# Patient Record
Sex: FEMALE | Race: WHITE | Employment: UNEMPLOYED | URBAN - NONMETROPOLITAN AREA
[De-identification: names, ages, dates, MRNs, and addresses within clinical notes are randomized per-mention and may not be internally consistent; named-entity substitution may affect disease eponyms.]

---

## 2022-03-30 ENCOUNTER — HOSPITAL ENCOUNTER (INPATIENT)
Age: 62
LOS: 3 days | Discharge: HOME | DRG: 287 | End: 2022-04-02
Attending: HOSPITALIST | Admitting: STUDENT IN AN ORGANIZED HEALTH CARE EDUCATION/TRAINING PROGRAM

## 2022-03-30 ENCOUNTER — APPOINTMENT (OUTPATIENT)
Dept: CT IMAGING | Age: 62
DRG: 287 | End: 2022-03-30
Attending: HOSPITALIST

## 2022-03-30 ENCOUNTER — APPOINTMENT (OUTPATIENT)
Dept: GENERAL RADIOLOGY | Age: 62
DRG: 287 | End: 2022-03-30
Attending: HOSPITALIST

## 2022-03-30 PROBLEM — R07.9 CHEST PAIN: Status: ACTIVE | Noted: 2022-03-30

## 2022-03-30 PROBLEM — I50.21 ACUTE HFREF (HEART FAILURE WITH REDUCED EJECTION FRACTION) (HCC): Status: ACTIVE | Noted: 2022-03-30

## 2022-03-30 PROBLEM — F17.200 CURRENT SMOKER: Status: ACTIVE | Noted: 2022-03-30

## 2022-03-30 LAB
ALBUMIN SERPL-MCNC: 4.5 G/DL (ref 3.5–5.2)
ALP BLD-CCNC: 139 U/L (ref 35–104)
ALT SERPL-CCNC: 48 U/L (ref 5–33)
ANION GAP SERPL CALCULATED.3IONS-SCNC: 17 MMOL/L (ref 7–19)
APTT: 26.3 SEC (ref 26–36.2)
AST SERPL-CCNC: 26 U/L (ref 5–32)
BASOPHILS ABSOLUTE: 0 K/UL (ref 0–0.2)
BASOPHILS RELATIVE PERCENT: 0.1 % (ref 0–1)
BILIRUB SERPL-MCNC: 0.4 MG/DL (ref 0.2–1.2)
BUN BLDV-MCNC: 20 MG/DL (ref 6–20)
CALCIUM SERPL-MCNC: 9.5 MG/DL (ref 8.6–10)
CHLORIDE BLD-SCNC: 101 MMOL/L (ref 98–111)
CO2: 21 MMOL/L (ref 22–29)
CREAT SERPL-MCNC: 0.8 MG/DL (ref 0.5–0.9)
D DIMER: 1.54 UG/ML FEU (ref 0–0.48)
EKG P AXIS: 68 DEGREES
EKG P-R INTERVAL: 132 MS
EKG Q-T INTERVAL: 382 MS
EKG QRS DURATION: 156 MS
EKG QTC CALCULATION (BAZETT): 459 MS
EKG T AXIS: 138 DEGREES
EOSINOPHILS ABSOLUTE: 0 K/UL (ref 0–0.6)
EOSINOPHILS RELATIVE PERCENT: 0 % (ref 0–5)
GFR AFRICAN AMERICAN: >59
GFR NON-AFRICAN AMERICAN: >60
GLUCOSE BLD-MCNC: 172 MG/DL (ref 74–109)
HCT VFR BLD CALC: 39.4 % (ref 37–47)
HEMOGLOBIN: 12.7 G/DL (ref 12–16)
IMMATURE GRANULOCYTES #: 0.1 K/UL
INR BLD: 0.99 (ref 0.88–1.18)
LV EF: 15 %
LVEF MODALITY: NORMAL
LYMPHOCYTES ABSOLUTE: 1 K/UL (ref 1.1–4.5)
LYMPHOCYTES RELATIVE PERCENT: 10.8 % (ref 20–40)
MCH RBC QN AUTO: 32.2 PG (ref 27–31)
MCHC RBC AUTO-ENTMCNC: 32.2 G/DL (ref 33–37)
MCV RBC AUTO: 99.7 FL (ref 81–99)
MONOCYTES ABSOLUTE: 0.1 K/UL (ref 0–0.9)
MONOCYTES RELATIVE PERCENT: 0.7 % (ref 0–10)
NEUTROPHILS ABSOLUTE: 7.7 K/UL (ref 1.5–7.5)
NEUTROPHILS RELATIVE PERCENT: 87.8 % (ref 50–65)
PDW BLD-RTO: 14.6 % (ref 11.5–14.5)
PLATELET # BLD: 247 K/UL (ref 130–400)
PMV BLD AUTO: 9.8 FL (ref 9.4–12.3)
POTASSIUM REFLEX MAGNESIUM: 4.2 MMOL/L (ref 3.5–5)
PRO-BNP: ABNORMAL PG/ML (ref 0–900)
PROTHROMBIN TIME: 13.3 SEC (ref 12–14.6)
RBC # BLD: 3.95 M/UL (ref 4.2–5.4)
SODIUM BLD-SCNC: 139 MMOL/L (ref 136–145)
TOTAL PROTEIN: 7.3 G/DL (ref 6.6–8.7)
TROPONIN: <0.01 NG/ML (ref 0–0.03)
TSH REFLEX FT4: 0.87 UIU/ML (ref 0.35–5.5)
WBC # BLD: 8.8 K/UL (ref 4.8–10.8)

## 2022-03-30 PROCEDURE — 6360000004 HC RX CONTRAST MEDICATION: Performed by: STUDENT IN AN ORGANIZED HEALTH CARE EDUCATION/TRAINING PROGRAM

## 2022-03-30 PROCEDURE — 6360000002 HC RX W HCPCS: Performed by: STUDENT IN AN ORGANIZED HEALTH CARE EDUCATION/TRAINING PROGRAM

## 2022-03-30 PROCEDURE — 85730 THROMBOPLASTIN TIME PARTIAL: CPT

## 2022-03-30 PROCEDURE — 84443 ASSAY THYROID STIM HORMONE: CPT

## 2022-03-30 PROCEDURE — 36415 COLL VENOUS BLD VENIPUNCTURE: CPT

## 2022-03-30 PROCEDURE — C8929 TTE W OR WO FOL WCON,DOPPLER: HCPCS

## 2022-03-30 PROCEDURE — 71275 CT ANGIOGRAPHY CHEST: CPT

## 2022-03-30 PROCEDURE — 93005 ELECTROCARDIOGRAM TRACING: CPT | Performed by: STUDENT IN AN ORGANIZED HEALTH CARE EDUCATION/TRAINING PROGRAM

## 2022-03-30 PROCEDURE — 6360000004 HC RX CONTRAST MEDICATION: Performed by: NURSE PRACTITIONER

## 2022-03-30 PROCEDURE — 85379 FIBRIN DEGRADATION QUANT: CPT

## 2022-03-30 PROCEDURE — 2580000003 HC RX 258: Performed by: STUDENT IN AN ORGANIZED HEALTH CARE EDUCATION/TRAINING PROGRAM

## 2022-03-30 PROCEDURE — 85610 PROTHROMBIN TIME: CPT

## 2022-03-30 PROCEDURE — 2140000000 HC CCU INTERMEDIATE R&B

## 2022-03-30 PROCEDURE — 84484 ASSAY OF TROPONIN QUANT: CPT

## 2022-03-30 PROCEDURE — 83880 ASSAY OF NATRIURETIC PEPTIDE: CPT

## 2022-03-30 PROCEDURE — 71045 X-RAY EXAM CHEST 1 VIEW: CPT

## 2022-03-30 PROCEDURE — 80053 COMPREHEN METABOLIC PANEL: CPT

## 2022-03-30 PROCEDURE — 85025 COMPLETE CBC W/AUTO DIFF WBC: CPT

## 2022-03-30 RX ORDER — FAMOTIDINE 10 MG
10 TABLET ORAL 2 TIMES DAILY PRN
COMMUNITY

## 2022-03-30 RX ORDER — SODIUM CHLORIDE 0.9 % (FLUSH) 0.9 %
5-40 SYRINGE (ML) INJECTION PRN
Status: DISCONTINUED | OUTPATIENT
Start: 2022-03-30 | End: 2022-04-02 | Stop reason: HOSPADM

## 2022-03-30 RX ORDER — SODIUM CHLORIDE 0.9 % (FLUSH) 0.9 %
5-40 SYRINGE (ML) INJECTION EVERY 12 HOURS SCHEDULED
Status: DISCONTINUED | OUTPATIENT
Start: 2022-03-30 | End: 2022-04-02 | Stop reason: HOSPADM

## 2022-03-30 RX ORDER — SPIRONOLACTONE 50 MG/1
25 TABLET, FILM COATED ORAL DAILY
Status: DISCONTINUED | OUTPATIENT
Start: 2022-03-31 | End: 2022-04-01

## 2022-03-30 RX ORDER — ACETAMINOPHEN 650 MG/1
650 SUPPOSITORY RECTAL EVERY 6 HOURS PRN
Status: DISCONTINUED | OUTPATIENT
Start: 2022-03-30 | End: 2022-04-02 | Stop reason: HOSPADM

## 2022-03-30 RX ORDER — CARVEDILOL 6.25 MG/1
6.25 TABLET ORAL 2 TIMES DAILY WITH MEALS
Status: DISCONTINUED | OUTPATIENT
Start: 2022-03-31 | End: 2022-04-01

## 2022-03-30 RX ORDER — LISINOPRIL 5 MG/1
5 TABLET ORAL DAILY
Status: DISCONTINUED | OUTPATIENT
Start: 2022-03-31 | End: 2022-04-01

## 2022-03-30 RX ORDER — FUROSEMIDE 10 MG/ML
40 INJECTION INTRAMUSCULAR; INTRAVENOUS 2 TIMES DAILY
Status: DISCONTINUED | OUTPATIENT
Start: 2022-03-30 | End: 2022-04-01

## 2022-03-30 RX ORDER — ACETAMINOPHEN 325 MG/1
650 TABLET ORAL EVERY 6 HOURS PRN
Status: DISCONTINUED | OUTPATIENT
Start: 2022-03-30 | End: 2022-04-02 | Stop reason: HOSPADM

## 2022-03-30 RX ORDER — POLYETHYLENE GLYCOL 3350 17 G/17G
17 POWDER, FOR SOLUTION ORAL DAILY PRN
Status: DISCONTINUED | OUTPATIENT
Start: 2022-03-30 | End: 2022-04-02 | Stop reason: HOSPADM

## 2022-03-30 RX ORDER — ALBUTEROL SULFATE 90 UG/1
2 AEROSOL, METERED RESPIRATORY (INHALATION) EVERY 6 HOURS PRN
COMMUNITY

## 2022-03-30 RX ORDER — ONDANSETRON 2 MG/ML
4 INJECTION INTRAMUSCULAR; INTRAVENOUS EVERY 6 HOURS PRN
Status: DISCONTINUED | OUTPATIENT
Start: 2022-03-30 | End: 2022-04-02 | Stop reason: HOSPADM

## 2022-03-30 RX ORDER — ONDANSETRON 4 MG/1
4 TABLET, ORALLY DISINTEGRATING ORAL EVERY 8 HOURS PRN
Status: DISCONTINUED | OUTPATIENT
Start: 2022-03-30 | End: 2022-04-02 | Stop reason: HOSPADM

## 2022-03-30 RX ORDER — SODIUM CHLORIDE 9 MG/ML
INJECTION, SOLUTION INTRAVENOUS PRN
Status: DISCONTINUED | OUTPATIENT
Start: 2022-03-30 | End: 2022-04-02 | Stop reason: HOSPADM

## 2022-03-30 RX ORDER — LANOLIN ALCOHOL/MO/W.PET/CERES
12 CREAM (GRAM) TOPICAL NIGHTLY
COMMUNITY

## 2022-03-30 RX ADMIN — IOPAMIDOL 90 ML: 755 INJECTION, SOLUTION INTRAVENOUS at 15:21

## 2022-03-30 RX ADMIN — FUROSEMIDE 40 MG: 10 INJECTION, SOLUTION INTRAMUSCULAR; INTRAVENOUS at 17:38

## 2022-03-30 RX ADMIN — SODIUM CHLORIDE, PRESERVATIVE FREE 10 ML: 5 INJECTION INTRAVENOUS at 20:35

## 2022-03-30 RX ADMIN — ENOXAPARIN SODIUM 40 MG: 40 INJECTION SUBCUTANEOUS at 16:36

## 2022-03-30 RX ADMIN — SODIUM CHLORIDE, PRESERVATIVE FREE 10 ML: 5 INJECTION INTRAVENOUS at 22:02

## 2022-03-30 RX ADMIN — PERFLUTREN 1.5 ML: 6.52 INJECTION, SUSPENSION INTRAVENOUS at 14:55

## 2022-03-30 ASSESSMENT — LIFESTYLE VARIABLES
HOW MANY STANDARD DRINKS CONTAINING ALCOHOL DO YOU HAVE ON A TYPICAL DAY: 1 OR 2
HOW OFTEN DO YOU HAVE A DRINK CONTAINING ALCOHOL: 2-4 TIMES A MONTH

## 2022-03-30 NOTE — PROGRESS NOTES
4 Eyes Skin Assessment    Anel Sharyn is being assessed upon: Admission    I agree that I, Yehuda Cordova RN, along with Terry Suarez RN (either 2 RN's or 1 LPN and 1 RN) have performed a thorough Head to Toe Skin Assessment on the patient. ALL assessment sites listed below have been assessed. Areas assessed by both nurses:     [x]   Head, Face, and Ears   [x]   Shoulders, Back, and Chest  [x]   Arms, Elbows, and Hands   [x]   Coccyx, Sacrum, and Ischium  [x]   Legs, Feet, and Heels    Does the Patient have Skin Breakdown?  No    Tanvir Prevention initiated: No  Wound Care Orders initiated: NA    [facility] nurse consulted for Pressure Injury (Stage 3,4, Unstageable, DTI, NWPT, and Complex wounds) and New or Established Ostomies: NA        Primary Nurse eSignature: Yehuda Cordova RN on 3/30/2022 at 5:32 PM      Co-Signer eSignature: Electronically signed by Terry Suarez RN on 3/30/22 at 6:21 PM CDT

## 2022-03-30 NOTE — PROGRESS NOTES
Cristiano Calle received from 3rd floor to room # 728 . Mental Status: Patient is oriented and alert. Vitals:    03/30/22 1716   BP: 122/74   Pulse: 104   Resp: 16   Temp: 97 °F (36.1 °C)   SpO2: 94%     Placed on cardiac monitor: Yes. Box # 728. Belongings: Glasses with patient at bedside . Family at bedside Yes. Oriented Patient to room. Call light within reach. Yes. Transfer was: Well tolerated by patient. .    Electronically signed by Fei Green RN on 3/30/2022 at 5:33 PM

## 2022-03-30 NOTE — H&P
[place], [place], [address]    DEPARTMENT OF HOSPITALIST MEDICINE      HISTORY & PHYSICAL:          REASON FOR ADMISSION:  No chief complaint on file. HISTORY OF PRESENT ILLNESS:  Celio Nolasco is an 63 y.o. female. With past medical history of COPD, current smoker 1 pack a day, status post right shoulder replacement. Patient states in December she got an upper respiratory infection did not think she had Covid and was not tested. Since that time she has had dyspnea on exertion. She complains of tight feeling at the base of her throat. . Sometimes breaks out in a sweat with this. The last 3 days she has had chest pressure radiating up towards her neck. Patient presented to [facility] for evaluation. She is found to have a positive troponin and was sent here for higher level care care on arrival she had an elevated D-dimer and a negative troponin. Patient was sent for an echo and she had a EF of 15% with LV dilation. CTA was negative for PE. PAST MEDICAL HISTORY:  No past medical history on file. PAST SURGICAL HISTORY:  Past Surgical History:   Procedure Laterality Date    ANKLE SURGERY Left      SECTION      SHOULDER SURGERY Right         SOCIAL HISTORY:  Social History     Socioeconomic History    Marital status:      Spouse name: None    Number of children: None    Years of education: None    Highest education level: None   Occupational History    None   Tobacco Use    Smoking status: Current Every Day Smoker     Packs/day: 0.50     Types: Cigarettes    Smokeless tobacco: Never Used   Substance and Sexual Activity    Alcohol use:  Yes    Drug use: Never    Sexual activity: None   Other Topics Concern    None   Social History Narrative    None     Social Determinants of Health     Financial Resource Strain:     Difficulty of Paying Living Expenses: Not on file   Food Insecurity:     Worried About Running Out of Food in the Last Year: Not on file    [address] in the Last Year: Not on file   Transportation Needs:     Lack of Transportation (Medical): Not on file    Lack of Transportation (Non-Medical):  Not on file   Physical Activity:     Days of Exercise per Week: Not on file    Minutes of Exercise per Session: Not on file   Stress:     Feeling of Stress : Not on file   Social Connections:     Frequency of Communication with Friends and Family: Not on file    Frequency of Social Gatherings with Friends and Family: Not on file    Attends Rastafarian Services: Not on file    Active Member of [address] [place] [address] [employer] or Organizations: Not on file    Attends Club or Organization Meetings: Not on file    Marital Status: Not on file   Intimate Partner Violence:     Fear of Current or Ex-Partner: Not on file    Emotionally Abused: Not on file    Physically Abused: Not on file    Sexually Abused: Not on file   Housing Stability:     Unable to Pay for Housing in the Last Year: Not on file    Number of [place] in the Last Year: Not on file    Unstable Housing in the Last Year: Not on file        FAMILY HISTORY:  Family History   Problem Relation Age of Onset    High Blood Pressure Father          ALLERGIES:  Allergies   Allergen Reactions    Morphine Nausea And Vomiting    Nickel Rash        PRIOR TO ADMISSION MEDS:  Medications Prior to Admission: melatonin 3 MG TABS tablet, Take 10 mg by mouth at bedtime     REVIEW OF SYSTEMS:  Constitutional:  No fevers, chills, nausea, vomiting, + tiredness & fatigue   Head:  No head injury, facial trauma   Eyes:  No acute visual changes, exudate, trauma   Ears:  No acute hearing loss, earaches   Nose: No nasal discharge, epistaxis   Neck: No new hoarseness, voice change, or new masses   Lungs:   No hemoptysis, pleurisy   Heart:  No chest pressure with exertion, palpitations,    Abdomen:   No new masses, no bright red blood per rectum   Extremities: No acute pain while ambulating, no new lesions   Skin: No new changes in skin color, no rashes or lesions   Neurologic: No new motor or sensory changes     14 point review of systems addressed with patient which is essentially negative except as specifically addressed above:    PHYSICAL EXAM:  /75   Pulse 107   Temp 96.9 °F (36.1 °C) (Temporal)   Resp 16   Ht 5' 6\" (1.676 m)   Wt 170 lb 12.8 oz (77.5 kg)   SpO2 96%   BMI 27.57 kg/m²   No intake/output data recorded.       PHYSICAL EXAMINATION:    Vital Signs: Please see the chart   KAREN:  Awake, alert, oriented x 3, patient appears tired and fatigued   Head/Eyes:  Normocephalic, atraumatic, EOMI and PERRLA bilaterally   ENT: Moist mucous membranes, nasal passages clear   Neck: Supple, full range of motion, no carotid bruit, trachea midline   Respiratory:   Bilateral fair air entry in both lung fields, mild B/L crackles, symmetric expansion of chest   Cardiovascular:  Regular rate and rhythm, S1+S2+0, no murmurs/rubs   Urology: No bilateral CVA tenderness, no suprapubic tenderness   Abdomen:   Soft, non-tender, bowel sounds +ve, no organomegaly   Muscle/Joints: Moves all, full range of motion, no muscle spasms   Extremities: No clubbing, no cyanosis, no calf tenderness, no edema   Pulses: 2+ bilaterally, symmetrical   Skin: Warm, dry, no pallor/cyanosis/jaundice, no rashes/lesions   Neurologic: Awake, alert, oriented x 3, cranial nerves II-XII intact, no focal neurological deficits, sensory system intact   Psychiatric: Normal mood, non-suicidal         LABORATORY DATA:    CBC:  Recent Labs     03/30/22  1351   WBC 8.8   HGB 12.7   HCT 39.4        BMP:  Recent Labs     03/30/22  1351      K 4.2      CO2 21*   BUN 20   CREATININE 0.8   CALCIUM 9.5     Recent Labs     03/30/22  1351   AST 26   ALT 48*   BILITOT 0.4   ALKPHOS 139*     Coag Panel:   Recent Labs     03/30/22  1351   INR 0.99   PROTIME 13.3   APTT 26.3     Cardiac Enzymes:   Recent Labs     03/30/22  1351   TROPONINI <0.01     ABGs:No results found for: PHART, PO2ART, VNF4ZPR  Urinalysis:No results found for: [facility], BACTERIA, RBCUA, BLOODU, SPECGRAV, GLUCOSEU  A1C: No results for input(s): LABA1C in the last 72 hours. ABG:No results for input(s): PHART, DTF9TFW, PO2ART, WHG7XNO, BEART, HGBAE, L4JIZLOZ, CARBOXHGBART in the last 72 hours. EKG:   Please see chart      IMAGING:  XR CHEST PORTABLE    Result Date: 3/30/2022  No acute cardiopulmonary process. Signed by Dr Sanchez Channel    CTA [address]     Result Date: 3/30/2022  1. No evidence of pulmonary embolus. 2. Left ventricle is quite dilated, suspect dilated cardiomyopathy. There is an associated cardiogenic interstitial edema as well as bilateral trace pleural effusions. Signed by Dr Bonnie Gonzalez and Plan:    Principal Problem:    Chest pain/acute heart failure with reduced EF   PCU   Continuous monitor  \ O2 as needed to keep both SPO2 above 90   Lasix 40 IV twice daily   Cardiology,   Serial troponin   BNP   Add spironolactone   Add Coreg after cath   No caffeine   N.p.o. after midnight  ` StrICT I & o     Active Problems:  Resolved Problems:    * No resolved hospital problems. *           Repeat labs in a.m. Electrolyte replacement as per protocol. Patient will be monitored very closely on the floor. Further recommendations as per the hospital course. Patient's management will be taken over by our [facility] Hospitalist Team in am.    Patient  is on DVT prophylaxis  Current medications reviewed  Lab work reviewed  Radiology/Chest x-ray films reviewed  Discussed with the nurse and addressed all questions/concerns  Discussed with Patient and/or Family at the bedside in detail . .. they verbalize understanding and agree with the management plan. Attestation:  Inpatient status is used for patients with an expected LOS extending past two midnights due to medical therapy and/or critical care needs  . .. all other patients are placed under OBServation status. Keri Zuleikahussein, DIANA - CNP  4:41 PM 3/30/2022      DISCLAIMER: This note was created with electronic voice recognition which does have occasional errors. If you have any questions regarding the content within the note please do not hesitate to contact me. .. Thanks.

## 2022-03-31 LAB
ANION GAP SERPL CALCULATED.3IONS-SCNC: 17 MMOL/L (ref 7–19)
BUN BLDV-MCNC: 24 MG/DL (ref 6–20)
CALCIUM SERPL-MCNC: 9.5 MG/DL (ref 8.6–10)
CHLORIDE BLD-SCNC: 96 MMOL/L (ref 98–111)
CO2: 23 MMOL/L (ref 22–29)
CREAT SERPL-MCNC: 0.9 MG/DL (ref 0.5–0.9)
GFR AFRICAN AMERICAN: >59
GFR NON-AFRICAN AMERICAN: >60
GLUCOSE BLD-MCNC: 136 MG/DL (ref 74–109)
HCT VFR BLD CALC: 38.8 % (ref 37–47)
HEMOGLOBIN: 12.6 G/DL (ref 12–16)
MAGNESIUM: 2 MG/DL (ref 1.6–2.6)
MCH RBC QN AUTO: 32.3 PG (ref 27–31)
MCHC RBC AUTO-ENTMCNC: 32.5 G/DL (ref 33–37)
MCV RBC AUTO: 99.5 FL (ref 81–99)
PDW BLD-RTO: 14.6 % (ref 11.5–14.5)
PLATELET # BLD: 270 K/UL (ref 130–400)
PMV BLD AUTO: 10.1 FL (ref 9.4–12.3)
POTASSIUM SERPL-SCNC: 4.5 MMOL/L (ref 3.5–5)
RBC # BLD: 3.9 M/UL (ref 4.2–5.4)
SODIUM BLD-SCNC: 136 MMOL/L (ref 136–145)
WBC # BLD: 10.7 K/UL (ref 4.8–10.8)

## 2022-03-31 PROCEDURE — 6370000000 HC RX 637 (ALT 250 FOR IP): Performed by: STUDENT IN AN ORGANIZED HEALTH CARE EDUCATION/TRAINING PROGRAM

## 2022-03-31 PROCEDURE — 93005 ELECTROCARDIOGRAM TRACING: CPT | Performed by: INTERNAL MEDICINE

## 2022-03-31 PROCEDURE — 36415 COLL VENOUS BLD VENIPUNCTURE: CPT

## 2022-03-31 PROCEDURE — 6370000000 HC RX 637 (ALT 250 FOR IP): Performed by: INTERNAL MEDICINE

## 2022-03-31 PROCEDURE — 2140000000 HC CCU INTERMEDIATE R&B

## 2022-03-31 PROCEDURE — 80048 BASIC METABOLIC PNL TOTAL CA: CPT

## 2022-03-31 PROCEDURE — 85027 COMPLETE CBC AUTOMATED: CPT

## 2022-03-31 PROCEDURE — 99223 1ST HOSP IP/OBS HIGH 75: CPT | Performed by: INTERNAL MEDICINE

## 2022-03-31 PROCEDURE — 83735 ASSAY OF MAGNESIUM: CPT

## 2022-03-31 PROCEDURE — 94640 AIRWAY INHALATION TREATMENT: CPT

## 2022-03-31 PROCEDURE — 6360000002 HC RX W HCPCS: Performed by: STUDENT IN AN ORGANIZED HEALTH CARE EDUCATION/TRAINING PROGRAM

## 2022-03-31 PROCEDURE — 2580000003 HC RX 258: Performed by: STUDENT IN AN ORGANIZED HEALTH CARE EDUCATION/TRAINING PROGRAM

## 2022-03-31 RX ORDER — ASPIRIN 81 MG/1
81 TABLET ORAL ONCE
Status: COMPLETED | OUTPATIENT
Start: 2022-04-01 | End: 2022-04-01

## 2022-03-31 RX ORDER — SODIUM CHLORIDE 9 MG/ML
INJECTION, SOLUTION INTRAVENOUS CONTINUOUS
Status: DISCONTINUED | OUTPATIENT
Start: 2022-04-01 | End: 2022-04-02

## 2022-03-31 RX ORDER — IPRATROPIUM BROMIDE AND ALBUTEROL SULFATE 2.5; .5 MG/3ML; MG/3ML
1 SOLUTION RESPIRATORY (INHALATION)
Status: DISCONTINUED | OUTPATIENT
Start: 2022-04-01 | End: 2022-04-02 | Stop reason: HOSPADM

## 2022-03-31 RX ADMIN — SODIUM CHLORIDE, PRESERVATIVE FREE 10 ML: 5 INJECTION INTRAVENOUS at 07:27

## 2022-03-31 RX ADMIN — FUROSEMIDE 40 MG: 10 INJECTION, SOLUTION INTRAMUSCULAR; INTRAVENOUS at 07:27

## 2022-03-31 RX ADMIN — LISINOPRIL 5 MG: 5 TABLET ORAL at 07:27

## 2022-03-31 RX ADMIN — FUROSEMIDE 40 MG: 10 INJECTION, SOLUTION INTRAMUSCULAR; INTRAVENOUS at 17:08

## 2022-03-31 RX ADMIN — SODIUM CHLORIDE, PRESERVATIVE FREE 10 ML: 5 INJECTION INTRAVENOUS at 20:59

## 2022-03-31 RX ADMIN — IPRATROPIUM BROMIDE AND ALBUTEROL SULFATE 1 AMPULE: 2.5; .5 SOLUTION RESPIRATORY (INHALATION) at 22:23

## 2022-03-31 RX ADMIN — ACETAMINOPHEN 650 MG: 325 TABLET ORAL at 02:50

## 2022-03-31 RX ADMIN — SPIRONOLACTONE 25 MG: 50 TABLET ORAL at 20:58

## 2022-03-31 ASSESSMENT — ENCOUNTER SYMPTOMS
GASTROINTESTINAL NEGATIVE: 1
SHORTNESS OF BREATH: 0
NAUSEA: 0
DIARRHEA: 0
VOMITING: 0
ALLERGIC/IMMUNOLOGIC NEGATIVE: 1
EYES NEGATIVE: 1
SHORTNESS OF BREATH: 1
RESPIRATORY NEGATIVE: 1

## 2022-03-31 ASSESSMENT — PAIN SCALES - GENERAL
PAINLEVEL_OUTOF10: 4
PAINLEVEL_OUTOF10: 0
PAINLEVEL_OUTOF10: 0

## 2022-03-31 NOTE — CONSULTS
[facility]  Cardiology Consult      Requesting MD:  Rubia Nick MD   Admit Status:         History obtained from:   [] Patient  [] Other (specify):     Patient:  Javier Saleh  [id number]     Chief Complaint: No chief complaint on file. HPI: Ms. Tariq Randolph is a 63 y.o. female with no prior cardiac history complains of seasonal allergies fall and sprain typically alleviated with prednisone and/or antibiotics which has been typically alleviated with medication such as prednisone and/or antibiotics for quite some time but recently beginning sometime in December she began having nocturnal episodes where she would awaken with shortness of breath intense pressure in her throat she has been to the emergency department on at least 3 occasions treated with over a wide variety of different prescriptions. Not seeming to get any better. Symptoms of been more prominent the past few days. Complains that she can only walk about 10 feet stopped due to dyspnea also has vague heaviness in her chest also complains that her calves are burned bilaterally. Seen at [facility] reportedly had a positive troponin had a negative troponin upon arrival here. D-dimer elevated 1.54 CTA pulmonary no evidence of pulmonary embolism. No prior cardiac history. Echocardiogram performed showed ejection fraction 15% cardiology consulted. No prior history of rheumatic heart fever. Review of Systems:  Review of Systems   Constitutional: Negative. Negative for chills, fever and unexpected weight change. HENT: Negative. Eyes: Negative. Respiratory: Negative. Negative for shortness of breath. Cardiovascular: Negative. Negative for chest pain. Gastrointestinal: Negative. Negative for diarrhea, nausea and vomiting. Endocrine: Negative. Genitourinary: Negative. Musculoskeletal: Negative. Skin: Negative. Neurological: Negative.     All other systems reviewed and are negative. Cardiac Specific Data:  Specialty Problems        Cardiology Problems    * (Principal) Acute HFrEF (heart failure with reduced ejection fraction) (HCC)        Chest pain              Past Medical History:  No past medical history on file. Past Surgical History:  Past Surgical History:   Procedure Laterality Date    ANKLE SURGERY Left      SECTION      SHOULDER SURGERY Right        Past Family History:  Family History   Problem Relation Age of Onset    High Blood Pressure Father        Past Social History:  Social History     Socioeconomic History    Marital status:      Spouse name: Not on file    Number of children: Not on file    Years of education: Not on file    Highest education level: Not on file   Occupational History    Not on file   Tobacco Use    Smoking status: Current Every Day Smoker     Packs/day: 0.50     Types: Cigarettes    Smokeless tobacco: Never Used   Substance and Sexual Activity    Alcohol use: Yes    Drug use: Never    Sexual activity: Not on file   Other Topics Concern    Not on file   Social History Narrative    Not on file     Social Determinants of Health     Financial Resource Strain:     Difficulty of Paying Living Expenses: Not on file   Food Insecurity:     Worried About Running Out of Food in the Last Year: Not on file    Mirela of Food in the Last Year: Not on file   Transportation Needs:     Lack of Transportation (Medical): Not on file    Lack of Transportation (Non-Medical):  Not on file   Physical Activity:     Days of Exercise per Week: Not on file    Minutes of Exercise per Session: Not on file   Stress:     Feeling of Stress : Not on file   Social Connections:     Frequency of Communication with Friends and Family: Not on file    Frequency of Social Gatherings with Friends and Family: Not on file    Attends Sikhism Services: Not on file    Active Member of Clubs or Organizations: Not on file    Attends Club or Organization Meetings: Not on file    Marital Status: Not on file   Intimate Partner Violence:     Fear of Current or Ex-Partner: Not on file    Emotionally Abused: Not on file    Physically Abused: Not on file    Sexually Abused: Not on file   Housing Stability:     Unable to Pay for Housing in the Last Year: Not on file    Number of Abby in the Last Year: Not on file    Unstable Housing in the Last Year: Not on file       Allergies: Allergies   Allergen Reactions    Morphine Nausea And Vomiting    Nickel Rash    Fish Allergy Nausea And Vomiting     Scallops         Home Meds:  Prior to Admission medications    Medication Sig Start Date End Date Taking? Authorizing Provider   melatonin 3 MG TABS tablet Take 10 mg by mouth at bedtime   Yes Historical Provider, MD   famotidine (PEPCID) 10 MG tablet Take 10 mg by mouth 2 times daily as needed   Yes Historical Provider, MD   albuterol sulfate HFA (VENTOLIN HFA) 108 (90 Base) MCG/ACT inhaler Inhale 2 puffs into the lungs every 6 hours as needed for Wheezing   Yes Historical Provider, MD       Current Meds:   sodium chloride flush  5-40 mL IntraVENous 2 times per day    [Held by provider] enoxaparin  40 mg SubCUTAneous Daily    furosemide  40 mg IntraVENous BID    spironolactone  25 mg Oral Daily    lisinopril  5 mg Oral Daily    [Held by provider] carvedilol  6.25 mg Oral BID WC       Current Infused Meds:   sodium chloride         Physical Exam:  Vitals:    03/31/22 0518   BP: 102/63   Pulse: 115   Resp: 18   Temp: 96.4 °F (35.8 °C)   SpO2: 92%       Intake/Output Summary (Last 24 hours) at 3/31/2022 1209  Last data filed at 3/31/2022 0804  Gross per 24 hour   Intake 620 ml   Output 3250 ml   Net -2630 ml     Estimated body mass index is 27.44 kg/m² as calculated from the following:    Height as of this encounter: 5' 6\" (1.676 m). Weight as of this encounter: 170 lb (77.1 kg). Physical Exam  Vitals reviewed.    Constitutional: General: She is not in acute distress. Appearance: Normal appearance. She is well-developed. She is obese. She is not ill-appearing, toxic-appearing or diaphoretic. HENT:      Head: Normocephalic and atraumatic. Nose: Nose normal.      Mouth/Throat:      Mouth: Mucous membranes are moist.      Pharynx: Oropharynx is clear. Eyes:      General: No scleral icterus. Extraocular Movements: Extraocular movements intact. Pupils: Pupils are equal, round, and reactive to light. Neck:      Vascular: No carotid bruit or JVD. Cardiovascular:      Rate and Rhythm: Normal rate and regular rhythm. Heart sounds: Normal heart sounds. No murmur heard. No friction rub. No gallop. Pulmonary:      Effort: Pulmonary effort is normal. No respiratory distress. Breath sounds: Normal breath sounds. No stridor. No wheezing, rhonchi or rales. Chest:      Chest wall: No tenderness. Abdominal:      General: Abdomen is flat. Bowel sounds are normal. There is no distension. Palpations: Abdomen is soft. There is no mass. Tenderness: There is no abdominal tenderness. There is no right CVA tenderness, left CVA tenderness, guarding or rebound. Hernia: No hernia is present. Musculoskeletal:         General: No swelling, tenderness, deformity or signs of injury. Cervical back: Normal range of motion and neck supple. No rigidity or tenderness. Right lower leg: No edema. Left lower leg: No edema. Lymphadenopathy:      Cervical: No cervical adenopathy. Skin:     General: Skin is warm and dry. Neurological:      General: No focal deficit present. Mental Status: She is alert and oriented to person, place, and time. Mental status is at baseline. Cranial Nerves: No cranial nerve deficit. Sensory: No sensory deficit. Motor: No weakness.       Coordination: Coordination normal.   Psychiatric:         Mood and Affect: Mood normal.         Behavior: Behavior normal.         Thought Content: Thought content normal.         Judgment: Judgment normal.         Labs:  Recent Labs     03/30/22  1351 03/31/22  0148   WBC 8.8 10.7   HGB 12.7 12.6    270       Recent Labs     03/30/22  1351 03/31/22  0148    136   K 4.2 4.5    96*   CO2 21* 23   BUN 20 24*   CREATININE 0.8 0.9   LABGLOM >60 >60   MG  --  2.0   CALCIUM 9.5 9.5       CK, CKMB, Troponin: @LABRCNT (CKTOTAL:3, CKMB:3, TROPONINI:3)@    Last 3 BNP:  No results for input(s): BNP in the last 72 hours. IMAGING:  ECHO Complete 2D W Doppler W Color    Result Date: 3/30/2022  Transthoracic Echocardiography Report (TTE)  Demographics   Patient Name  Ross Francis Date of Study          03/30/2022   MRN           [id number]        Gender                 Female   Date of Birth 1960    Room Number            [place] [id number]   Age           63 year(s)   Height:       66 inches     Referring Physician    Kelly Riley   Weight:       170 pounds    Sonographer            [facility]   BSA:          1.87 m^2      Interpreting Physician Adebayo Fraser DO   BMI:          27.44 kg/m^2  Procedure Type of Study   TTE procedure:ECHO 2D W/DOPPLER/COLOR/CONTRAST. Study Location: Echo Lab Technical Quality: Adequate visualization Patient Status: Inpatient Contrast Medium: Definity. Amount - 8 ml BP: 114/48 mmHg Indications:Chest pain. Conclusions   Summary  Left ventricular chamber size is severely dilated. .  Mild concentric left ventricular hypertrophy. Left ventricular systolic function is severely reduced  Severe left ventricular global hypokinesis. Left ventricular ejection fraction is visually estimated at 15%. There is mild mitral regurgitation. The left atrium is moderately dilated.    Signature   ----------------------------------------------------------------  Electronically signed by Adebayo Fraser DO(Interpreting  physician) on 03/30/2022 04:55 PM ----------------------------------------------------------------   Findings   Mitral Valve  Structurally normal mitral valve leaflets. There is mild mitral regurgitation. Aortic Valve  Structurally normal aortic valve. No aortic regurgitation . Tricuspid Valve  Normal tricuspid valve leaflet thickness and excursion. There is trace tricuspid regurgitation. Estimated PA systolic pressure is 44JN mercury. Pulmonic Valve  No significant pulmonic regurgitation. Left Atrium  The left atrium is moderately dilated. Left Ventricle  Left ventricular chamber size is severely dilated. .  Mild concentric left ventricular hypertrophy. Left ventricular systolic function is severely reduced  Severe left ventricular global hypokinesis. Paradoxical septal motion consistent with left bundle branch block . Left ventricular ejection fraction is visually estimated at 15%. No evidence of left ventricular mass or thrombus noted. Right Atrium  Normal right atrial size. Right Ventricle  Normal right ventricular chamber size and function. Pericardial Effusion  No pericardial effusion. Miscellaneous  IVC diameter is normal, suggesting normal right atrial pressure. M-Mode Measurements (cm)   LVIDd: 6.29 cm                         LVIDs: 5.7 cm  IVSd: 1.26 cm  LVPWd: 1.23 cm                         AO Root Dimension: 2.2 cm  Rt. Vent. Dimension: 2.73 cm           LA: 4.5 cm  % Ejection Fraction: 15 %              LVOT: 2 cm  Doppler Measurements:   AV Peak Velocity:174 cm/s            MV Peak E-Wave: 147 cm/s  AV Peak Gradient: 12.11 mmHg  AV Mean Gradient: 6 mmHg             MV Peak Gradient: 8.64 mmHg  AV Area (Continuity):1.81 cm^2  TR Velocity:284 cm/s  TR Gradient:32.26 mmHg  Estimated RAP:3 mmHg  RVSP:35 mmHg      XR CHEST PORTABLE    Result Date: 3/30/2022  XR CHEST PORTABLE 3/30/2022 1:33 PM HISTORY: Chest pain  Technique: Single AP view of the chest COMPARISONS: None FINDINGS: No lung consolidation.  No pleural effusion or pneumothorax. Underlying cardiomegaly. Central pulmonary vessels are nondilated. Right shoulder arthroplasty. No acute bony abnormality. No acute cardiopulmonary process. Signed by Dr Alli Flores    Result Date: 3/30/2022  CTA PULMONARY W CONTRAST 3/30/2022 3:19 PM HISTORY: Cough, elevated d-dimer COMPARISON: Chest x-ray dated 3/30/2022. DLP: 954 mGy cm TECHNIQUE: Helical tomographic images of the chest were obtained after the administration of intravenous contrast following angiogram protocol. Additionally, 3D MIP reconstructions in the coronal and sagittal planes were provided. Automated exposure control was also utilized to decrease patient radiation dose. FINDINGS:  Pulmonary arteries: There is adequate enhancement of the pulmonary arteries to evaluate for central and segmental pulmonary emboli. There are no filling defects within the main, lobar, segmental or visualized subsegmental pulmonary arteries. The pulmonary vessels are within normal limits for size. Aorta and great vessels: The aorta is not well opacified with contrast due to the phase of the exam.. Minimal calcified plaque in the arch. Minimal coronary artery calcifications are visualized. Neck base: The imaged portion of the base of the neck appears unremarkable. Lungs: There are a few small centrilobular thin-walled cysts which appears to be a mild centrilobular emphysema. Bilateral interlobular septal thickening and peribronchial thickening. Trace bilateral pleural effusion. Airways are clear. Heart: Left ventricle is quite dilated. There is no pericardial effusion. Lymph nodes: No pathologically enlarged mediastinal, hilar, or axillary lymph nodes are present. Bones and soft tissues: Incidentally noted bilateral breast augmentation. The osseous structures of the thorax and surrounding soft tissues demonstrate no acute process. Facet arthropathy. Upper abdomen:  The imaged portion of the upper abdomen demonstrates no acute process. Low density right adrenal adenoma. 1. No evidence of pulmonary embolus. 2. Left ventricle is quite dilated, suspect dilated cardiomyopathy. There is an associated cardiogenic interstitial edema as well as bilateral trace pleural effusions. Signed by Dr Serafin Tavarez      Assessment:  1. Several month history of intermittent complaints of nocturnal dyspnea and throat pressure  2. CTA pulmonary no evidence of pulmonary embolism dilated left ventricle associated cardiogenic interstitial edema bilateral trace pleural effusions  3. Tobacco abuse ongoing  4. Complaints of exertional shortness of breath and vague chest tightness suggestive of angina  5. Abnormal echocardiogram ejection fraction estimated 15% severe global hypokinesis mild MR dilated left atrium  6. Abnormal electrocardiogram sinus rhythm with left bundle branch block  7. Complaints of bilateral calf discomfort  8. Symptoms of intermittent seasonal allergies      Recommendations:  1. Recommend diagnostic cardiac catheterization for definitive assessment advise indication alternatives benefits and risk plan for tomorrow morning further comments to follow continue current medical management  I have discussed with the patient regarding indications for the proposed procedure LEFT HEART CATHETERIZATION AND POSSIBLE PERCUTANEOUS INTERVENTION  along with possible alternatives benefits and risks including but not limited to risks of death, myocardial infarction, stroke, contrast induced nephropathy which in some cases may lead to acute kidney failure requiring dialysis, allergic reactions, bleeding requiring blood transfusion,  cardiac arrhthymias, respiratory failure which may require placing the patient on respiratory support such as a ventilator or breathing machine,risk of complications which may require vascular surgery, and if coronary intervention is performed emergency CABG may be required in less than 1% of cases.  The patient is awake and alert and understands the issues involved and indicates willingness to proceed as ordered. The patient does not have any contraindications to dual antiplatelet therapy. The patient does not have any known  pending surgical procedures in the next 12 months at this time. The patient is  a reasonable candidate for moderate conscious sedation.     ASA score:  ASA 3 - Patient with moderate systemic disease with functional limitations    Mallampati: I (soft palate, uvula, fauces, tonsillar pillars visible)    Preferred vascular access site will be: right radial artery

## 2022-03-31 NOTE — PLAN OF CARE
Problem: Falls - Risk of:  Goal: Will remain free from falls  Description: Will remain free from falls  3/31/2022 1407 by Amanda Hargrove RN  Outcome: Ongoing  3/31/2022 0023 by Rupa Hinojosa RN  Outcome: Ongoing  3/31/2022 0021 by Rupa Hinojosa RN  Outcome: Ongoing  Goal: Absence of physical injury  Description: Absence of physical injury  3/31/2022 1407 by Amanda Hargrove RN  Outcome: Ongoing  3/31/2022 0023 by Rupa Hinojosa RN  Outcome: Ongoing  3/31/2022 0021 by Rupa Hinojosa RN  Outcome: Ongoing     Problem: Cardiac:  Goal: Ability to maintain an adequate cardiac output will improve  Description: Ability to maintain an adequate cardiac output will improve  3/31/2022 1407 by Amanda Hargrove RN  Outcome: Ongoing  3/31/2022 0023 by Rupa Hinojosa RN  Outcome: Ongoing  Goal: Hemodynamic stability will improve  Description: Hemodynamic stability will improve  3/31/2022 1407 by Amanda Hargrove RN  Outcome: Ongoing  3/31/2022 0023 by Rupa Hinojosa RN  Outcome: Ongoing   Electronically signed by Amanda Hargrove RN on 3/31/2022 at 2:08 PM

## 2022-03-31 NOTE — PROGRESS NOTES
Physician Progress Note      PATIENT:               Katie Houston  CSN #:                  [id number]  :                       1960  ADMIT DATE:       3/30/2022 12:55 PM  [address] [place] DATE:  RESPONDING  PROVIDER #:        Hernan Olmstead          QUERY TEXT:    Pt admitted with chest pain. Pt noted to have heart  failure with reduced EF. . If possible, please document in progress notes and discharge summary if you   are evaluating and/or treating any of the following: The medical record reflects the following:  Risk Factors: Chest pain, COPD, smoker  Clinical Indicators: BNP 71122, EF 15%   CXR - underlying cardiomegaly. Treatment: Labs, Echo, Lasix IV 40 mg b.i.d. Thank you    Bindu Bradshaw RN, BSN, [facility]  [phone]  Options provided:  -- Acute on Chronic Systolic CHF/HFrEF  -- Acute on Chronic Systolic and Diastolic CHF  -- Acute Systolic CHF/HFrEF  -- Acute Systolic and Diastolic CHF  -- Other - I will add my own diagnosis  -- Disagree - Not applicable / Not valid  -- Disagree - Clinically unable to determine / Unknown  -- Refer to Clinical Documentation Reviewer    PROVIDER RESPONSE TEXT:    This patient is in acute systolic CHF/HFrEF.     Query created by: Erwin Delgado on 3/31/2022 9:44 AM      Electronically signed by:  Hernan Olmstead 3/31/2022 10:14 AM

## 2022-03-31 NOTE — PLAN OF CARE
Problem: Falls - Risk of:  Goal: Will remain free from falls  Description: Will remain free from falls  Outcome: Ongoing  Goal: Absence of physical injury  Description: Absence of physical injury  Outcome: Ongoing     Problem: Cardiac:  Goal: Ability to maintain an adequate cardiac output will improve  Description: Ability to maintain an adequate cardiac output will improve  Outcome: Ongoing  Goal: Hemodynamic stability will improve  Description: Hemodynamic stability will improve  Outcome: Ongoing

## 2022-03-31 NOTE — PROGRESS NOTES
Patient is NPO after midnight. Received her bath. Pulled her IV in the shower. New IV applied. Pt. Is refusing to sign to consent for Lexiscan before talk to doctor. We notified the charge nurse.

## 2022-03-31 NOTE — PROGRESS NOTES
[facility]      Progress Note    Patient:  Kev Carpenter  YOB: 1960  Date of Service: 3/31/2022  MRN: [id number]   Acct: [de-identified]   Primary Care Physician: No primary care provider on file. Advance Directive: Full Code  Admit Date: 3/30/2022       Hospital Day: 1    Portions of this note have been copied forward, however, updated to reflect the most current clinical status of this patient. CHIEF COMPLAINT Chest pain    SUBJECTIVE:  Anxious. Refused lexiscan until she could speak with cardiology. They did come and see her and decision made to skip the lexiscan and do heart cath in a.m. CUMULATIVE HOSPITAL COURSE:     Kev Carpenter is an 63 y.o. female. With past medical history of COPD, current smoker 1 pack a day, status post right shoulder replacement. Patient states in December she got an upper respiratory infection did not think she had Covid and was not tested. Since that time she has had dyspnea on exertion. She complains of tight feeling at the base of her throat. . Sometimes breaks out in a sweat with this. The last 3 days she has had chest pressure radiating up towards her neck. Patient presented to [facility] for evaluation. She is found to have a positive troponin and was sent here for higher level care care on arrival she had an elevated D-dimer and a negative troponin. Patient was sent for an echo and she had a EF of 15% with LV dilation. CTA was negative for PE. Iv lasix,ace inhibitor and aldactone initiated. CTA neg for PE. She was seen by cardio and will have ccath in a.m. Marlin Verma Review of Systems   Constitutional: Negative. HENT: Negative. Eyes: Negative. Respiratory: Positive for shortness of breath. Cardiovascular: Positive for chest pain. Gastrointestinal: Negative. Endocrine: Negative. Musculoskeletal: Negative. Allergic/Immunologic: Negative. Neurological: Negative. Hematological: Negative. Psychiatric/Behavioral: Negative. Objective:   VITALS:  /63   Pulse 115   Temp 96.4 °F (35.8 °C) (Temporal)   Resp 18   Ht 5' 6\" (1.676 m)   Wt 170 lb (77.1 kg)   SpO2 92%   BMI 27.44 kg/m²   24HR INTAKE/OUTPUT:    Intake/Output Summary (Last 24 hours) at 3/31/2022 1329  Last data filed at 3/31/2022 0804  Gross per 24 hour   Intake 620 ml   Output 3250 ml   Net -2630 ml           Physical Exam  Vitals and nursing note reviewed. Constitutional:       Appearance: Normal appearance. HENT:      Head: Normocephalic and atraumatic. Mouth/Throat:      Mouth: Mucous membranes are moist.   Eyes:      Extraocular Movements: Extraocular movements intact. Cardiovascular:      Rate and Rhythm: Normal rate and regular rhythm. Pulses: Normal pulses. Heart sounds: Normal heart sounds. Pulmonary:      Effort: Pulmonary effort is normal.      Breath sounds: Wheezing present. Abdominal:      General: Bowel sounds are normal.      Palpations: Abdomen is soft. Musculoskeletal:      Cervical back: Neck supple. Right lower leg: No edema. Left lower leg: No edema. Skin:     General: Skin is warm and dry. Comments: Mikhail complexion   Neurological:      General: No focal deficit present. Mental Status: She is alert. Psychiatric:         Mood and Affect: Mood normal.            Medications:      sodium chloride        sodium chloride flush  5-40 mL IntraVENous 2 times per day    [Held by provider] enoxaparin  40 mg SubCUTAneous Daily    furosemide  40 mg IntraVENous BID    spironolactone  25 mg Oral Daily    lisinopril  5 mg Oral Daily    [Held by provider] carvedilol  6.25 mg Oral BID      regadenoson, sodium chloride flush, sodium chloride, ondansetron **OR** ondansetron, polyethylene glycol, acetaminophen **OR** acetaminophen  ADULT DIET;  Regular; Low Fat/Low Chol/High Fiber/OLIVER  Diet NPO Exceptions are: Sips of Water with Meds     Lab and other Data: Recent Labs     03/30/22  1351 03/31/22  0148   WBC 8.8 10.7   HGB 12.7 12.6    270     Recent Labs     03/30/22  1351 03/31/22  0148    136   K 4.2 4.5    96*   CO2 21* 23   BUN 20 24*   CREATININE 0.8 0.9   GLUCOSE 172* 136*     Recent Labs     03/30/22  1351   AST 26   ALT 48*   BILITOT 0.4   ALKPHOS 139*     Troponin T:   Recent Labs     03/30/22  1351 03/30/22  1704 03/30/22  2138   TROPONINI <0.01 <0.01 <0.01     Pro-BNP: No results for input(s): BNP in the last 72 hours. INR:   Recent Labs     03/30/22  1351   INR 0.99     UA:No results for input(s): NITRITE, COLORU, PHUR, LABCAST, WBCUA, RBCUA, MUCUS, TRICHOMONAS, YEAST, BACTERIA, CLARITYU, SPECGRAV, LEUKOCYTESUR, UROBILINOGEN, BILIRUBINUR, BLOODU, GLUCOSEU, AMORPHOUS in the last 72 hours. Invalid input(s): Euel Sinks  A1C: No results for input(s): LABA1C in the last 72 hours. ABG:No results for input(s): PHART, HAP8YFD, PO2ART, GID2JIZ, BEART, HGBAE, S8IKORBP, CARBOXHGBART in the last 72 hours. RAD:   ECHO Complete 2D W Doppler W Color    Result Date: 3/30/2022  Transthoracic Echocardiography Report (TTE)  Demographics   Patient Name  Merlin Current Date of Study          03/30/2022   MRN           [id number]        Gender                 Female   Date of Birth 1960    Room Number            MHL-0323   Age           63 year(s)   Height:       66 inches     Referring Physician    Phillip Jamison   Weight:       170 pounds    Sonographer            Amber Omalley RDCS   BSA:          1.87 m^2      Interpreting Physician Ricci Knowles DO   BMI:          27.44 kg/m^2  Procedure Type of Study   TTE procedure:ECHO 2D W/DOPPLER/COLOR/CONTRAST. Study Location: Echo Lab Technical Quality: Adequate visualization Patient Status: Inpatient Contrast Medium: Definity. Amount - 8 ml BP: 114/48 mmHg Indications:Chest pain. Conclusions   Summary  Left ventricular chamber size is severely dilated.  .  Mild concentric left ventricular hypertrophy. Left ventricular systolic function is severely reduced  Severe left ventricular global hypokinesis. Left ventricular ejection fraction is visually estimated at 15%. There is mild mitral regurgitation. The left atrium is moderately dilated. Signature   ----------------------------------------------------------------  Electronically signed by Patience Akhtar DO(Interpreting  physician) on 03/30/2022 04:55 PM  ----------------------------------------------------------------   Findings   Mitral Valve  Structurally normal mitral valve leaflets. There is mild mitral regurgitation. Aortic Valve  Structurally normal aortic valve. No aortic regurgitation . Tricuspid Valve  Normal tricuspid valve leaflet thickness and excursion. There is trace tricuspid regurgitation. Estimated PA systolic pressure is 97ON mercury. Pulmonic Valve  No significant pulmonic regurgitation. Left Atrium  The left atrium is moderately dilated. Left Ventricle  Left ventricular chamber size is severely dilated. .  Mild concentric left ventricular hypertrophy. Left ventricular systolic function is severely reduced  Severe left ventricular global hypokinesis. Paradoxical septal motion consistent with left bundle branch block . Left ventricular ejection fraction is visually estimated at 15%. No evidence of left ventricular mass or thrombus noted. Right Atrium  Normal right atrial size. Right Ventricle  Normal right ventricular chamber size and function. Pericardial Effusion  No pericardial effusion. Miscellaneous  IVC diameter is normal, suggesting normal right atrial pressure. M-Mode Measurements (cm)   LVIDd: 6.29 cm                         LVIDs: 5.7 cm  IVSd: 1.26 cm  LVPWd: 1.23 cm                         AO Root Dimension: 2.2 cm  Rt. Vent.  Dimension: 2.73 cm           LA: 4.5 cm  % Ejection Fraction: 15 %              LVOT: 2 cm  Doppler Measurements:   AV Peak Velocity:174 cm/s            MV Peak E-Wave: 147 cm/s  AV Peak Gradient: 12.11 mmHg  AV Mean Gradient: 6 mmHg             MV Peak Gradient: 8.64 mmHg  AV Area (Continuity):1.81 cm^2  TR Velocity:284 cm/s  TR Gradient:32.26 mmHg  Estimated RAP:3 mmHg  RVSP:35 mmHg      XR CHEST PORTABLE    Result Date: 3/30/2022  XR CHEST PORTABLE 3/30/2022 1:33 PM HISTORY: Chest pain  Technique: Single AP view of the chest COMPARISONS: None FINDINGS: No lung consolidation. No pleural effusion or pneumothorax. Underlying cardiomegaly. Central pulmonary vessels are nondilated. Right shoulder arthroplasty. No acute bony abnormality. No acute cardiopulmonary process. Signed by Dr Andres Galvan    Result Date: 3/30/2022  CTA PULMONARY W CONTRAST 3/30/2022 3:19 PM HISTORY: Cough, elevated d-dimer COMPARISON: Chest x-ray dated 3/30/2022. DLP: 954 mGy cm TECHNIQUE: Helical tomographic images of the chest were obtained after the administration of intravenous contrast following angiogram protocol. Additionally, 3D MIP reconstructions in the coronal and sagittal planes were provided. Automated exposure control was also utilized to decrease patient radiation dose. FINDINGS:  Pulmonary arteries: There is adequate enhancement of the pulmonary arteries to evaluate for central and segmental pulmonary emboli. There are no filling defects within the main, lobar, segmental or visualized subsegmental pulmonary arteries. The pulmonary vessels are within normal limits for size. Aorta and great vessels: The aorta is not well opacified with contrast due to the phase of the exam.. Minimal calcified plaque in the arch. Minimal coronary artery calcifications are visualized. Neck base: The imaged portion of the base of the neck appears unremarkable. Lungs: There are a few small centrilobular thin-walled cysts which appears to be a mild centrilobular emphysema. Bilateral interlobular septal thickening and peribronchial thickening. Trace bilateral pleural effusion. Airways are clear. Heart: Left ventricle is quite dilated. There is no pericardial effusion. Lymph nodes: No pathologically enlarged mediastinal, hilar, or axillary lymph nodes are present. Bones and soft tissues: Incidentally noted bilateral breast augmentation. The osseous structures of the thorax and surrounding soft tissues demonstrate no acute process. Facet arthropathy. Upper abdomen: The imaged portion of the upper abdomen demonstrates no acute process. Low density right adrenal adenoma. 1. No evidence of pulmonary embolus. 2. Left ventricle is quite dilated, suspect dilated cardiomyopathy. There is an associated cardiogenic interstitial edema as well as bilateral trace pleural effusions. Signed by Dr Yen Gage              Assessment/Plan     Principal Problem:    Chest pain/acute heart failure with reduced EF              PCU              Continuous monitor  \           O2 as needed to keep both SPO2 above 90              Lasix 40 IV twice daily              Cardiology consult              Serial troponin              BNP              Add spironolactone              Add Coreg after cath              No caffeine              N.p.o. after midnight  `           StrICT I & o                Active Problems:  Resolved Problems:    * No resolved hospital problems. *       Discharge planning: tbd    Further Orders per Clinical course/attending. Electronically signed by DIANA Roque CNP on 3/31/2022 at 1:29 PM       EMR Dragon/Transcription disclaimer:   Much of this encounter note is an electronic transcription/translation of spoken language to printed text.  The electronic translation of spoken language may permit erroneous, or at times, nonsensical words or phrases to be inadvertently transcribed; although attempts have made to review the note for such errors, some may still exist.

## 2022-04-01 ENCOUNTER — APPOINTMENT (OUTPATIENT)
Dept: CARDIAC CATH/INVASIVE PROCEDURES | Age: 62
DRG: 287 | End: 2022-04-01
Attending: HOSPITALIST

## 2022-04-01 LAB
ANION GAP SERPL CALCULATED.3IONS-SCNC: 13 MMOL/L (ref 7–19)
BUN BLDV-MCNC: 32 MG/DL (ref 6–20)
C-REACTIVE PROTEIN: 0.61 MG/DL (ref 0–0.5)
CALCIUM SERPL-MCNC: 9.4 MG/DL (ref 8.6–10)
CHLORIDE BLD-SCNC: 97 MMOL/L (ref 98–111)
CO2: 27 MMOL/L (ref 22–29)
CREAT SERPL-MCNC: 1.1 MG/DL (ref 0.5–0.9)
EKG P AXIS: 65 DEGREES
EKG P AXIS: 73 DEGREES
EKG P-R INTERVAL: 126 MS
EKG P-R INTERVAL: 134 MS
EKG Q-T INTERVAL: 364 MS
EKG Q-T INTERVAL: 392 MS
EKG QRS DURATION: 148 MS
EKG QRS DURATION: 154 MS
EKG QTC CALCULATION (BAZETT): 456 MS
EKG QTC CALCULATION (BAZETT): 460 MS
EKG T AXIS: 107 DEGREES
EKG T AXIS: 122 DEGREES
GFR AFRICAN AMERICAN: >59
GFR NON-AFRICAN AMERICAN: 51
GLUCOSE BLD-MCNC: 93 MG/DL (ref 74–109)
MAGNESIUM: 2.2 MG/DL (ref 1.6–2.6)
POTASSIUM SERPL-SCNC: 4.7 MMOL/L (ref 3.5–5)
PRO-BNP: 4699 PG/ML (ref 0–900)
SEDIMENTATION RATE, ERYTHROCYTE: 23 MM/HR (ref 0–25)
SODIUM BLD-SCNC: 137 MMOL/L (ref 136–145)

## 2022-04-01 PROCEDURE — 6370000000 HC RX 637 (ALT 250 FOR IP): Performed by: INTERNAL MEDICINE

## 2022-04-01 PROCEDURE — 2500000003 HC RX 250 WO HCPCS

## 2022-04-01 PROCEDURE — 93458 L HRT ARTERY/VENTRICLE ANGIO: CPT | Performed by: INTERNAL MEDICINE

## 2022-04-01 PROCEDURE — 93010 ELECTROCARDIOGRAM REPORT: CPT | Performed by: INTERNAL MEDICINE

## 2022-04-01 PROCEDURE — 99152 MOD SED SAME PHYS/QHP 5/>YRS: CPT

## 2022-04-01 PROCEDURE — 2580000003 HC RX 258: Performed by: INTERNAL MEDICINE

## 2022-04-01 PROCEDURE — 94640 AIRWAY INHALATION TREATMENT: CPT

## 2022-04-01 PROCEDURE — 6360000002 HC RX W HCPCS

## 2022-04-01 PROCEDURE — 2709999900 HC NON-CHARGEABLE SUPPLY

## 2022-04-01 PROCEDURE — 93458 L HRT ARTERY/VENTRICLE ANGIO: CPT

## 2022-04-01 PROCEDURE — 99152 MOD SED SAME PHYS/QHP 5/>YRS: CPT | Performed by: INTERNAL MEDICINE

## 2022-04-01 PROCEDURE — 86140 C-REACTIVE PROTEIN: CPT

## 2022-04-01 PROCEDURE — 93005 ELECTROCARDIOGRAM TRACING: CPT | Performed by: INTERNAL MEDICINE

## 2022-04-01 PROCEDURE — 2140000000 HC CCU INTERMEDIATE R&B

## 2022-04-01 PROCEDURE — 6360000004 HC RX CONTRAST MEDICATION: Performed by: STUDENT IN AN ORGANIZED HEALTH CARE EDUCATION/TRAINING PROGRAM

## 2022-04-01 PROCEDURE — 36415 COLL VENOUS BLD VENIPUNCTURE: CPT

## 2022-04-01 PROCEDURE — 83735 ASSAY OF MAGNESIUM: CPT

## 2022-04-01 PROCEDURE — 2580000003 HC RX 258: Performed by: STUDENT IN AN ORGANIZED HEALTH CARE EDUCATION/TRAINING PROGRAM

## 2022-04-01 PROCEDURE — C1894 INTRO/SHEATH, NON-LASER: HCPCS

## 2022-04-01 PROCEDURE — 85652 RBC SED RATE AUTOMATED: CPT

## 2022-04-01 PROCEDURE — 4A023N7 MEASUREMENT OF CARDIAC SAMPLING AND PRESSURE, LEFT HEART, PERCUTANEOUS APPROACH: ICD-10-PCS | Performed by: INTERNAL MEDICINE

## 2022-04-01 PROCEDURE — B2151ZZ FLUOROSCOPY OF LEFT HEART USING LOW OSMOLAR CONTRAST: ICD-10-PCS | Performed by: INTERNAL MEDICINE

## 2022-04-01 PROCEDURE — C1769 GUIDE WIRE: HCPCS

## 2022-04-01 PROCEDURE — 80048 BASIC METABOLIC PNL TOTAL CA: CPT

## 2022-04-01 PROCEDURE — B2111ZZ FLUOROSCOPY OF MULTIPLE CORONARY ARTERIES USING LOW OSMOLAR CONTRAST: ICD-10-PCS | Performed by: INTERNAL MEDICINE

## 2022-04-01 PROCEDURE — 86702 HIV-2 ANTIBODY: CPT

## 2022-04-01 PROCEDURE — 86701 HIV-1ANTIBODY: CPT

## 2022-04-01 PROCEDURE — 83880 ASSAY OF NATRIURETIC PEPTIDE: CPT

## 2022-04-01 RX ORDER — CARVEDILOL 6.25 MG/1
6.25 TABLET ORAL 2 TIMES DAILY WITH MEALS
Status: DISCONTINUED | OUTPATIENT
Start: 2022-04-02 | End: 2022-04-02 | Stop reason: HOSPADM

## 2022-04-01 RX ORDER — SPIRONOLACTONE 50 MG/1
25 TABLET, FILM COATED ORAL DAILY
Status: DISCONTINUED | OUTPATIENT
Start: 2022-04-02 | End: 2022-04-02 | Stop reason: HOSPADM

## 2022-04-01 RX ORDER — HYDRALAZINE HYDROCHLORIDE 20 MG/ML
10 INJECTION INTRAMUSCULAR; INTRAVENOUS EVERY 10 MIN PRN
Status: DISCONTINUED | OUTPATIENT
Start: 2022-04-01 | End: 2022-04-02 | Stop reason: HOSPADM

## 2022-04-01 RX ORDER — LISINOPRIL 5 MG/1
5 TABLET ORAL DAILY
Status: DISCONTINUED | OUTPATIENT
Start: 2022-04-02 | End: 2022-04-01

## 2022-04-01 RX ORDER — FUROSEMIDE 40 MG/1
40 TABLET ORAL DAILY
Status: DISCONTINUED | OUTPATIENT
Start: 2022-04-02 | End: 2022-04-02

## 2022-04-01 RX ORDER — SODIUM CHLORIDE 9 MG/ML
1000 INJECTION, SOLUTION INTRAVENOUS CONTINUOUS
Status: DISCONTINUED | OUTPATIENT
Start: 2022-04-01 | End: 2022-04-01

## 2022-04-01 RX ADMIN — SODIUM CHLORIDE, PRESERVATIVE FREE 10 ML: 5 INJECTION INTRAVENOUS at 20:53

## 2022-04-01 RX ADMIN — SACUBITRIL AND VALSARTAN 1 TABLET: 24; 26 TABLET, FILM COATED ORAL at 20:43

## 2022-04-01 RX ADMIN — IPRATROPIUM BROMIDE AND ALBUTEROL SULFATE 1 AMPULE: 2.5; .5 SOLUTION RESPIRATORY (INHALATION) at 14:57

## 2022-04-01 RX ADMIN — IPRATROPIUM BROMIDE AND ALBUTEROL SULFATE 1 AMPULE: 2.5; .5 SOLUTION RESPIRATORY (INHALATION) at 07:10

## 2022-04-01 RX ADMIN — IPRATROPIUM BROMIDE AND ALBUTEROL SULFATE 1 AMPULE: 2.5; .5 SOLUTION RESPIRATORY (INHALATION) at 11:07

## 2022-04-01 RX ADMIN — IPRATROPIUM BROMIDE AND ALBUTEROL SULFATE 1 AMPULE: 2.5; .5 SOLUTION RESPIRATORY (INHALATION) at 19:18

## 2022-04-01 RX ADMIN — IOPAMIDOL 90 ML: 612 INJECTION, SOLUTION INTRAVENOUS at 08:45

## 2022-04-01 RX ADMIN — ASPIRIN 81 MG: 81 TABLET, COATED ORAL at 06:13

## 2022-04-01 RX ADMIN — SODIUM CHLORIDE 1000 ML: 9 INJECTION, SOLUTION INTRAVENOUS at 09:29

## 2022-04-01 RX ADMIN — SODIUM CHLORIDE: 9 INJECTION, SOLUTION INTRAVENOUS at 06:54

## 2022-04-01 RX ADMIN — SODIUM CHLORIDE, PRESERVATIVE FREE 10 ML: 5 INJECTION INTRAVENOUS at 09:30

## 2022-04-01 ASSESSMENT — ENCOUNTER SYMPTOMS
EYES NEGATIVE: 1
ALLERGIC/IMMUNOLOGIC NEGATIVE: 1
SHORTNESS OF BREATH: 1
GASTROINTESTINAL NEGATIVE: 1

## 2022-04-01 NOTE — PROGRESS NOTES
Cardiac catheterization preliminary note right radial artery access tolerated well left ventricle markedly dilated with ejection fraction consistent with 15% by echocardiography coronary angiograms no significant disease  Impressions: Nonischemic dilated cardiomyopathy  Recommendations: Medical management and consideration for biventricular ICD after 90 days if left ventricular dysfunction persists

## 2022-04-01 NOTE — PROGRESS NOTES
[facility]      Progress Note    Patient:  Oneyda Gutierrez  YOB: 1960  Date of Service: 4/1/2022  MRN: [id number]   Acct: [de-identified]   Primary Care Physician: No primary care provider on file. Advance Directive: Full Code  Admit Date: 3/30/2022       Hospital Day: 2    Portions of this note have been copied forward, however, updated to reflect the most current clinical status of this patient. CHIEF COMPLAINT Chest pain    SUBJECTIVE: Heart cath this a.m  No blockages. SHe is anxious about having to wear a life vest.  Social work trying to get insurance      [address] COURSE:     Oneyda Gutierrez is an 63 y.o. female. With past medical history of COPD, current smoker 1 pack a day, status post right shoulder replacement. Patient states in December she got an upper respiratory infection did not think she had Covid and was not tested. Since that time she has had dyspnea on exertion. She complains of tight feeling at the base of her throat. . Sometimes breaks out in a sweat with this. The last 3 days she has had chest pressure radiating up towards her neck. Patient presented to [facility] for evaluation. She is found to have a positive troponin and was sent here for higher level care care on arrival she had an elevated D-dimer and a negative troponin. Patient was sent for an echo and she had a EF of 15% with LV dilation. CTA was negative for PE. Iv lasix,ace inhibitor and aldactone initiated. CTA neg for PE. She went for cath today and has no significant blockage. Social work is assisting with insurance coverage. She will require a life vest and possibly defibrillator. Review of Systems   Constitutional: Negative. HENT: Negative. Eyes: Negative. Respiratory: Positive for shortness of breath. Cardiovascular: Positive for chest pain. Gastrointestinal: Negative. Endocrine: Negative. Musculoskeletal: Negative. Allergic/Immunologic: Negative. Neurological: Negative. Hematological: Negative. Psychiatric/Behavioral: Negative. Objective:   VITALS:  /83   Pulse 96   Temp 97 °F (36.1 °C)   Resp 20   Ht 5' 6\" (1.676 m)   Wt 167 lb 12.8 oz (76.1 kg)   SpO2 96%   BMI 27.08 kg/m²   24HR INTAKE/OUTPUT:      Intake/Output Summary (Last 24 hours) at 4/1/2022 1401  Last data filed at 4/1/2022 0612  Gross per 24 hour   Intake 540 ml   Output 1850 ml   Net -1310 ml           Physical Exam  Vitals and nursing note reviewed. Constitutional:       Appearance: Normal appearance. HENT:      Head: Normocephalic and atraumatic. Mouth/Throat:      Mouth: Mucous membranes are moist.   Eyes:      Extraocular Movements: Extraocular movements intact. Cardiovascular:      Rate and Rhythm: Normal rate and regular rhythm. Pulses: Normal pulses. Heart sounds: Normal heart sounds. Pulmonary:      Effort: Pulmonary effort is normal.      Breath sounds: Wheezing present. Abdominal:      General: Bowel sounds are normal.      Palpations: Abdomen is soft. Musculoskeletal:      Cervical back: Neck supple. Right lower leg: No edema. Left lower leg: No edema. Skin:     General: Skin is warm and dry. Comments: Mikhail complexion   Neurological:      General: No focal deficit present. Mental Status: She is alert.    Psychiatric:         Mood and Affect: Mood normal.            Medications:      sodium chloride 1,000 mL (04/01/22 0929)    sodium chloride 75 mL/hr at 04/01/22 0654    sodium chloride        [START ON 4/2/2022] furosemide  40 mg Oral Daily    [START ON 4/2/2022] carvedilol  6.25 mg Oral BID WC    [START ON 4/2/2022] spironolactone  25 mg Oral Daily    sacubitril-valsartan  1 tablet Oral BID    ipratropium-albuterol  1 ampule Inhalation Q4H WA    sodium chloride flush  5-40 mL IntraVENous 2 times per day    [Held by provider] enoxaparin  40 mg SubCUTAneous Daily     iopamidol, hydrALAZINE, regadenoson, sodium chloride flush, sodium chloride, ondansetron **OR** ondansetron, polyethylene glycol, acetaminophen **OR** acetaminophen  ADULT DIET; Regular     Lab and other Data:     Recent Labs     03/30/22  1351 03/31/22  0148   WBC 8.8 10.7   HGB 12.7 12.6    270     Recent Labs     03/30/22  1351 03/31/22  0148 04/01/22  0457    136 137   K 4.2 4.5 4.7    96* 97*   CO2 21* 23 27   BUN 20 24* 32*   CREATININE 0.8 0.9 1.1*   GLUCOSE 172* 136* 93     Recent Labs     03/30/22  1351   AST 26   ALT 48*   BILITOT 0.4   ALKPHOS 139*     Troponin T:   Recent Labs     03/30/22  1351 03/30/22  1704 03/30/22  2138   TROPONINI <0.01 <0.01 <0.01     Pro-BNP: No results for input(s): BNP in the last 72 hours. INR:   Recent Labs     03/30/22  1351   INR 0.99     UA:No results for input(s): NITRITE, COLORU, PHUR, LABCAST, WBCUA, RBCUA, MUCUS, TRICHOMONAS, YEAST, BACTERIA, CLARITYU, SPECGRAV, LEUKOCYTESUR, UROBILINOGEN, BILIRUBINUR, BLOODU, GLUCOSEU, AMORPHOUS in the last 72 hours. Invalid input(s): Jaime Hedarlin  A1C: No results for input(s): LABA1C in the last 72 hours. ABG:No results for input(s): PHART, XBF3CDC, PO2ART, JCB4HRM, BEART, HGBAE, Z4VCNVJH, CARBOXHGBART in the last 72 hours. RAD:   ECHO Complete 2D W Doppler W Color    Result Date: 3/30/2022  Transthoracic Echocardiography Report (TTE)  Demographics   Patient Name  Mandy Pair Date of Study          03/30/2022   MRN           [id number]        Gender                 Female   Date of Birth 1960    Room Number            MHL-0323   Age           63 year(s)   Height:       66 inches     Referring Physician    Tiffanie Simmons   Weight:       170 pounds    Sonographer            Amber Omalley, [facility]   BSA:          1.87 m^2      Interpreting Physician Gifty Price DO   BMI:          27.44 kg/m^2  Procedure Type of Study   TTE procedure:ECHO 2D W/DOPPLER/COLOR/CONTRAST.   Study Location: Echo Lab Technical Quality: Adequate visualization Patient Status: Inpatient Contrast Medium: Definity. Amount - 8 ml BP: 114/48 mmHg Indications:Chest pain. Conclusions   Summary  Left ventricular chamber size is severely dilated. .  Mild concentric left ventricular hypertrophy. Left ventricular systolic function is severely reduced  Severe left ventricular global hypokinesis. Left ventricular ejection fraction is visually estimated at 15%. There is mild mitral regurgitation. The left atrium is moderately dilated. Signature   ----------------------------------------------------------------  Electronically signed by Rogelio Awad DO(Interpreting  physician) on 03/30/2022 04:55 PM  ----------------------------------------------------------------   Findings   Mitral Valve  Structurally normal mitral valve leaflets. There is mild mitral regurgitation. Aortic Valve  Structurally normal aortic valve. No aortic regurgitation . Tricuspid Valve  Normal tricuspid valve leaflet thickness and excursion. There is trace tricuspid regurgitation. Estimated PA systolic pressure is 88ZK mercury. Pulmonic Valve  No significant pulmonic regurgitation. Left Atrium  The left atrium is moderately dilated. Left Ventricle  Left ventricular chamber size is severely dilated. .  Mild concentric left ventricular hypertrophy. Left ventricular systolic function is severely reduced  Severe left ventricular global hypokinesis. Paradoxical septal motion consistent with left bundle branch block . Left ventricular ejection fraction is visually estimated at 15%. No evidence of left ventricular mass or thrombus noted. Right Atrium  Normal right atrial size. Right Ventricle  Normal right ventricular chamber size and function. Pericardial Effusion  No pericardial effusion. Miscellaneous  IVC diameter is normal, suggesting normal right atrial pressure.   M-Mode Measurements (cm)   LVIDd: 6.29 cm                         LVIDs: 5.7 cm  IVSd: 1.26 cm  LVPWd: 1.23 cm                         AO Root Dimension: 2.2 cm  Rt. Vent. Dimension: 2.73 cm           LA: 4.5 cm  % Ejection Fraction: 15 %              LVOT: 2 cm  Doppler Measurements:   AV Peak Velocity:174 cm/s            MV Peak E-Wave: 147 cm/s  AV Peak Gradient: 12.11 mmHg  AV Mean Gradient: 6 mmHg             MV Peak Gradient: 8.64 mmHg  AV Area (Continuity):1.81 cm^2  TR Velocity:284 cm/s  TR Gradient:32.26 mmHg  Estimated RAP:3 mmHg  RVSP:35 mmHg      XR CHEST PORTABLE    Result Date: 3/30/2022  XR CHEST PORTABLE 3/30/2022 1:33 PM HISTORY: Chest pain  Technique: Single AP view of the chest COMPARISONS: None FINDINGS: No lung consolidation. No pleural effusion or pneumothorax. Underlying cardiomegaly. Central pulmonary vessels are nondilated. Right shoulder arthroplasty. No acute bony abnormality. No acute cardiopulmonary process. Signed by Dr Viviana Merlin    Result Date: 3/30/2022  CTA PULMONARY W CONTRAST 3/30/2022 3:19 PM HISTORY: Cough, elevated d-dimer COMPARISON: Chest x-ray dated 3/30/2022. DLP: 954 mGy cm TECHNIQUE: Helical tomographic images of the chest were obtained after the administration of intravenous contrast following angiogram protocol. Additionally, 3D MIP reconstructions in the coronal and sagittal planes were provided. Automated exposure control was also utilized to decrease patient radiation dose. FINDINGS:  Pulmonary arteries: There is adequate enhancement of the pulmonary arteries to evaluate for central and segmental pulmonary emboli. There are no filling defects within the main, lobar, segmental or visualized subsegmental pulmonary arteries. The pulmonary vessels are within normal limits for size. Aorta and great vessels: The aorta is not well opacified with contrast due to the phase of the exam.. Minimal calcified plaque in the arch. Minimal coronary artery calcifications are visualized. Neck base:  The imaged portion of the base of the neck appears unremarkable. Lungs: There are a few small centrilobular thin-walled cysts which appears to be a mild centrilobular emphysema. Bilateral interlobular septal thickening and peribronchial thickening. Trace bilateral pleural effusion. Airways are clear. Heart: Left ventricle is quite dilated. There is no pericardial effusion. Lymph nodes: No pathologically enlarged mediastinal, hilar, or axillary lymph nodes are present. Bones and soft tissues: Incidentally noted bilateral breast augmentation. The osseous structures of the thorax and surrounding soft tissues demonstrate no acute process. Facet arthropathy. Upper abdomen: The imaged portion of the upper abdomen demonstrates no acute process. Low density right adrenal adenoma. 1. No evidence of pulmonary embolus. 2. Left ventricle is quite dilated, suspect dilated cardiomyopathy. There is an associated cardiogenic interstitial edema as well as bilateral trace pleural effusions. Signed by Dr Diaz Hence              Assessment/Plan     Principal Problem:    Chest pain/acute heart failure with reduced EF              PCU              Continuous monitor  \           O2 as needed to keep both SPO2 above 90              Lasix 40 p.o              Cardiology following              Serial troponin-neg              [id number]           Coreg 6.25 bid starting in a.m.   sptinolactone 25 daily starting in a.m. Entresto 24/26 bid per cards  `           StrICT I & o   Daily renal lab                Active Problems:  Resolved Problems:    * No resolved hospital problems. *       Discharge planning: tbd    Further Orders per Clinical course/attending. Electronically signed by DIANA Costa CNP on 4/1/2022 at 2:01 PM       EMR Dragon/Transcription disclaimer:   Much of this encounter note is an electronic transcription/translation of spoken language to printed text.  The electronic translation of spoken language may permit erroneous, or at times, nonsensical words or phrases to be inadvertently transcribed; although attempts have made to review the note for such errors, some may still exist.

## 2022-04-02 ENCOUNTER — APPOINTMENT (OUTPATIENT)
Dept: GENERAL RADIOLOGY | Age: 62
End: 2022-04-02

## 2022-04-02 ENCOUNTER — HOSPITAL ENCOUNTER (EMERGENCY)
Age: 62
Discharge: HOME | End: 2022-04-02

## 2022-04-02 VITALS
HEIGHT: 66 IN | DIASTOLIC BLOOD PRESSURE: 74 MMHG | SYSTOLIC BLOOD PRESSURE: 103 MMHG | RESPIRATION RATE: 20 BRPM | OXYGEN SATURATION: 96 % | WEIGHT: 167 LBS | BODY MASS INDEX: 26.84 KG/M2 | HEART RATE: 88 BPM | TEMPERATURE: 98 F

## 2022-04-02 VITALS
BODY MASS INDEX: 26.97 KG/M2 | HEIGHT: 66 IN | RESPIRATION RATE: 20 BRPM | SYSTOLIC BLOOD PRESSURE: 113 MMHG | OXYGEN SATURATION: 94 % | WEIGHT: 167.8 LBS | TEMPERATURE: 97.9 F | DIASTOLIC BLOOD PRESSURE: 82 MMHG | HEART RATE: 107 BPM

## 2022-04-02 DIAGNOSIS — R55 SYNCOPE AND COLLAPSE: Primary | ICD-10-CM

## 2022-04-02 LAB
ALBUMIN SERPL-MCNC: 4.1 G/DL (ref 3.5–5.2)
ALP BLD-CCNC: 117 U/L (ref 35–104)
ALT SERPL-CCNC: 23 U/L (ref 5–33)
ANION GAP SERPL CALCULATED.3IONS-SCNC: 13 MMOL/L (ref 7–19)
ANION GAP SERPL CALCULATED.3IONS-SCNC: 14 MMOL/L (ref 7–19)
AST SERPL-CCNC: 14 U/L (ref 5–32)
BASOPHILS ABSOLUTE: 0 K/UL (ref 0–0.2)
BASOPHILS ABSOLUTE: 0.1 K/UL (ref 0–0.2)
BASOPHILS RELATIVE PERCENT: 0 % (ref 0–1)
BASOPHILS RELATIVE PERCENT: 0.5 % (ref 0–1)
BILIRUB SERPL-MCNC: 0.3 MG/DL (ref 0.2–1.2)
BUN BLDV-MCNC: 24 MG/DL (ref 6–20)
BUN BLDV-MCNC: 26 MG/DL (ref 6–20)
CALCIUM SERPL-MCNC: 8.8 MG/DL (ref 8.6–10)
CALCIUM SERPL-MCNC: 9.1 MG/DL (ref 8.6–10)
CHLORIDE BLD-SCNC: 101 MMOL/L (ref 98–111)
CHLORIDE BLD-SCNC: 98 MMOL/L (ref 98–111)
CO2: 21 MMOL/L (ref 22–29)
CO2: 24 MMOL/L (ref 22–29)
CREAT SERPL-MCNC: 0.9 MG/DL (ref 0.5–0.9)
CREAT SERPL-MCNC: 0.9 MG/DL (ref 0.5–0.9)
EOSINOPHILS ABSOLUTE: 0.2 K/UL (ref 0–0.6)
EOSINOPHILS ABSOLUTE: 0.24 K/UL (ref 0–0.6)
EOSINOPHILS RELATIVE PERCENT: 1.3 % (ref 0–5)
EOSINOPHILS RELATIVE PERCENT: 2 % (ref 0–5)
GFR AFRICAN AMERICAN: >59
GFR AFRICAN AMERICAN: >59
GFR NON-AFRICAN AMERICAN: >60
GFR NON-AFRICAN AMERICAN: >60
GLUCOSE BLD-MCNC: 114 MG/DL (ref 74–109)
GLUCOSE BLD-MCNC: 94 MG/DL (ref 74–109)
HCT VFR BLD CALC: 43.2 % (ref 37–47)
HCT VFR BLD CALC: 47.5 % (ref 37–47)
HEMOGLOBIN: 14.2 G/DL (ref 12–16)
HEMOGLOBIN: 15.1 G/DL (ref 12–16)
IMMATURE GRANULOCYTES #: 0 K/UL
IMMATURE GRANULOCYTES #: 0.1 K/UL
LYMPHOCYTES ABSOLUTE: 3.9 K/UL (ref 1.1–4.5)
LYMPHOCYTES ABSOLUTE: 4.6 K/UL (ref 1.1–4.5)
LYMPHOCYTES RELATIVE PERCENT: 31.9 % (ref 20–40)
LYMPHOCYTES RELATIVE PERCENT: 33 % (ref 20–40)
MACROCYTES: ABNORMAL
MAGNESIUM: 2.3 MG/DL (ref 1.6–2.6)
MCH RBC QN AUTO: 32.2 PG (ref 27–31)
MCH RBC QN AUTO: 32.7 PG (ref 27–31)
MCHC RBC AUTO-ENTMCNC: 31.8 G/DL (ref 33–37)
MCHC RBC AUTO-ENTMCNC: 32.9 G/DL (ref 33–37)
MCV RBC AUTO: 101.3 FL (ref 81–99)
MCV RBC AUTO: 99.5 FL (ref 81–99)
MONOCYTES ABSOLUTE: 0.5 K/UL (ref 0–0.9)
MONOCYTES ABSOLUTE: 0.8 K/UL (ref 0–0.9)
MONOCYTES RELATIVE PERCENT: 4 % (ref 0–10)
MONOCYTES RELATIVE PERCENT: 5.8 % (ref 0–10)
NEUTROPHILS ABSOLUTE: 7.2 K/UL (ref 1.5–7.5)
NEUTROPHILS ABSOLUTE: 8.7 K/UL (ref 1.5–7.5)
NEUTROPHILS RELATIVE PERCENT: 60.1 % (ref 50–65)
NEUTROPHILS RELATIVE PERCENT: 61 % (ref 50–65)
OVALOCYTES: ABNORMAL
PDW BLD-RTO: 14.3 % (ref 11.5–14.5)
PDW BLD-RTO: 14.6 % (ref 11.5–14.5)
PLATELET # BLD: 308 K/UL (ref 130–400)
PLATELET # BLD: 324 K/UL (ref 130–400)
PLATELET SLIDE REVIEW: ADEQUATE
PMV BLD AUTO: 9.5 FL (ref 9.4–12.3)
PMV BLD AUTO: 9.7 FL (ref 9.4–12.3)
POTASSIUM SERPL-SCNC: 4.5 MMOL/L (ref 3.5–5)
POTASSIUM SERPL-SCNC: 4.9 MMOL/L (ref 3.5–5)
PRO-BNP: 2169 PG/ML (ref 0–900)
PRO-BNP: 2275 PG/ML (ref 0–900)
RBC # BLD: 4.34 M/UL (ref 4.2–5.4)
RBC # BLD: 4.69 M/UL (ref 4.2–5.4)
SODIUM BLD-SCNC: 132 MMOL/L (ref 136–145)
SODIUM BLD-SCNC: 139 MMOL/L (ref 136–145)
TOTAL PROTEIN: 6.5 G/DL (ref 6.6–8.7)
TROPONIN: <0.01 NG/ML (ref 0–0.03)
WBC # BLD: 11.8 K/UL (ref 4.8–10.8)
WBC # BLD: 14.5 K/UL (ref 4.8–10.8)

## 2022-04-02 PROCEDURE — 36415 COLL VENOUS BLD VENIPUNCTURE: CPT

## 2022-04-02 PROCEDURE — 2580000003 HC RX 258: Performed by: STUDENT IN AN ORGANIZED HEALTH CARE EDUCATION/TRAINING PROGRAM

## 2022-04-02 PROCEDURE — 85025 COMPLETE CBC W/AUTO DIFF WBC: CPT

## 2022-04-02 PROCEDURE — 6370000000 HC RX 637 (ALT 250 FOR IP): Performed by: STUDENT IN AN ORGANIZED HEALTH CARE EDUCATION/TRAINING PROGRAM

## 2022-04-02 PROCEDURE — 83880 ASSAY OF NATRIURETIC PEPTIDE: CPT

## 2022-04-02 PROCEDURE — 84484 ASSAY OF TROPONIN QUANT: CPT

## 2022-04-02 PROCEDURE — 6360000002 HC RX W HCPCS: Performed by: HOSPITALIST

## 2022-04-02 PROCEDURE — 94640 AIRWAY INHALATION TREATMENT: CPT

## 2022-04-02 PROCEDURE — 6370000000 HC RX 637 (ALT 250 FOR IP): Performed by: INTERNAL MEDICINE

## 2022-04-02 PROCEDURE — 83735 ASSAY OF MAGNESIUM: CPT

## 2022-04-02 PROCEDURE — 99285 EMERGENCY DEPT VISIT HI MDM: CPT

## 2022-04-02 PROCEDURE — 71045 X-RAY EXAM CHEST 1 VIEW: CPT

## 2022-04-02 PROCEDURE — 99232 SBSQ HOSP IP/OBS MODERATE 35: CPT | Performed by: INTERNAL MEDICINE

## 2022-04-02 PROCEDURE — 93005 ELECTROCARDIOGRAM TRACING: CPT | Performed by: EMERGENCY MEDICINE

## 2022-04-02 PROCEDURE — 80053 COMPREHEN METABOLIC PANEL: CPT

## 2022-04-02 RX ORDER — CARVEDILOL 6.25 MG/1
6.25 TABLET ORAL 2 TIMES DAILY WITH MEALS
Qty: 60 TABLET | Refills: 3 | Status: SHIPPED | OUTPATIENT
Start: 2022-04-02 | End: 2022-07-29 | Stop reason: SDUPTHER

## 2022-04-02 RX ORDER — FUROSEMIDE 10 MG/ML
40 INJECTION INTRAMUSCULAR; INTRAVENOUS DAILY
Status: DISCONTINUED | OUTPATIENT
Start: 2022-04-02 | End: 2022-04-02 | Stop reason: HOSPADM

## 2022-04-02 RX ORDER — FUROSEMIDE 40 MG/1
40 TABLET ORAL DAILY
Qty: 30 TABLET | Refills: 0 | Status: SHIPPED | OUTPATIENT
Start: 2022-04-02 | End: 2022-04-25 | Stop reason: SDUPTHER

## 2022-04-02 RX ORDER — IPRATROPIUM BROMIDE AND ALBUTEROL SULFATE 2.5; .5 MG/3ML; MG/3ML
3 SOLUTION RESPIRATORY (INHALATION) EVERY 4 HOURS PRN
Qty: 90 ML | Refills: 0 | Status: SHIPPED | OUTPATIENT
Start: 2022-04-02

## 2022-04-02 RX ORDER — SPIRONOLACTONE 25 MG/1
25 TABLET ORAL DAILY
Qty: 30 TABLET | Refills: 0 | Status: SHIPPED | OUTPATIENT
Start: 2022-04-03 | End: 2022-04-25 | Stop reason: SDUPTHER

## 2022-04-02 RX ADMIN — SPIRONOLACTONE 25 MG: 50 TABLET ORAL at 09:53

## 2022-04-02 RX ADMIN — CARVEDILOL 6.25 MG: 6.25 TABLET, FILM COATED ORAL at 09:53

## 2022-04-02 RX ADMIN — IPRATROPIUM BROMIDE AND ALBUTEROL SULFATE 1 AMPULE: 2.5; .5 SOLUTION RESPIRATORY (INHALATION) at 10:51

## 2022-04-02 RX ADMIN — SACUBITRIL AND VALSARTAN 1 TABLET: 24; 26 TABLET, FILM COATED ORAL at 09:53

## 2022-04-02 RX ADMIN — IPRATROPIUM BROMIDE AND ALBUTEROL SULFATE 1 AMPULE: 2.5; .5 SOLUTION RESPIRATORY (INHALATION) at 07:25

## 2022-04-02 RX ADMIN — SODIUM CHLORIDE, PRESERVATIVE FREE 10 ML: 5 INJECTION INTRAVENOUS at 09:59

## 2022-04-02 RX ADMIN — FUROSEMIDE 40 MG: 10 INJECTION, SOLUTION INTRAMUSCULAR; INTRAVENOUS at 09:57

## 2022-04-02 ASSESSMENT — ENCOUNTER SYMPTOMS
ABDOMINAL DISTENTION: 0
ABDOMINAL PAIN: 0
COLOR CHANGE: 0
SHORTNESS OF BREATH: 0
BACK PAIN: 0
RHINORRHEA: 0
APNEA: 0
COUGH: 0
SORE THROAT: 0
PHOTOPHOBIA: 0
EYE PAIN: 0
EYE DISCHARGE: 0
NAUSEA: 0

## 2022-04-02 NOTE — DISCHARGE SUMMARY
[place], [place], [address]    DEPARTMENT OF HOSPITALIST MEDICINE      DISCHARGE SUMMARY:      PATIENT NAME:  Alana Vaughan  :  1960  MRN:  [id number]    Admission Date:   3/30/2022 12:55 PM Attending: Ubaldo Campos, *   Discharge Date:   2022              PCP: No primary care provider on file. Length of Stay: 3 days     Chief Complaint on Admission: No chief complaint on file. Consultants:     IP CONSULT TO CARDIOLOGY       Discharge Problem List:   Principal Problem:    Acute HFrEF (heart failure with reduced ejection fraction) ([address]  Active Problems:    Chest pain    Current smoker  Resolved Problems:    * No resolved hospital problems. *         Last dated Assessment and Plan . .. 2022      CUMULATIVE  HOSPITAL  COURSE  AND  TREATMENT:     Tiarra Solano an 63 y.o. female.  With past medical history of COPD, current smoker 1 pack a day, status post right shoulder replacement.  Patient states in December she got an upper respiratory infection did not think she had Covid and was not tested.  Since that time she has had dyspnea on exertion.  She complains of tight feeling at the base of her throat. . Sometimes breaks out in a sweat with this.  The last 3 days she has had chest pressure radiating up towards her neck.  Patient presented to [facility] for evaluation.  She is found to have a positive troponin and was sent here for higher level care care on arrival she had an elevated D-dimer and a negative troponin.  Patient was sent for an echo and she had a EF of 15% with LV dilation.  CTA was negative for PE. Iv lasix,ace inhibitor and aldactone initiated. CTA neg for PE. She went for cath today and has no significant blockage. Social work is assisting with insurance coverage. She will go home on 4 new medications and those have been discussed with her. She has been preovided coupon for her first month of entresto. Her home o2 eval was nl. Labs in one week . Follow up with PCP and cardiology    OBJECTIVE:  /82   Pulse 107   Temp 97.9 °F (36.6 °C) (Temporal)   Resp 20   Ht 5' 6\" (1.676 m)   Wt 167 lb 12.8 oz (76.1 kg)   SpO2 94%   BMI 27.08 kg/m²       Heart: RRR   Lungs: Bilateral fair air entry   Abdomen: Soft, non-tender   Extremities: No edema   Neurologic: Alert and oriented   Skin: Warm and dry          Laboratory Data:  Recent Labs     03/30/22  1351 03/31/22  0148 04/02/22  0948   WBC 8.8 10.7 11.8*   HGB 12.7 12.6 15.1    270 324     Recent Labs     03/31/22  0148 04/01/22  0457 04/02/22  0153    137 139   K 4.5 4.7 4.5   CL 96* 97* 101   CO2 23 27 24   BUN 24* 32* 24*   CREATININE 0.9 1.1* 0.9   GLUCOSE 136* 93 94     Recent Labs     03/30/22  1351   AST 26   ALT 48*   BILITOT 0.4   ALKPHOS 139*     Troponin T:   Recent Labs     03/30/22  1351 03/30/22  1704 03/30/22  2138   TROPONINI <0.01 <0.01 <0.01     Pro-BNP: No results for input(s): BNP in the last 72 hours. INR:   Recent Labs     03/30/22  1351   INR 0.99     UA:No results for input(s): NITRITE, COLORU, PHUR, LABCAST, WBCUA, RBCUA, MUCUS, TRICHOMONAS, YEAST, BACTERIA, CLARITYU, SPECGRAV, LEUKOCYTESUR, UROBILINOGEN, BILIRUBINUR, BLOODU, GLUCOSEU, AMORPHOUS in the last 72 hours. Invalid input(s): Dick Brine  A1C: No results for input(s): LABA1C in the last 72 hours. ABG:No results for input(s): PHART, XEB4GAG, PO2ART, RSY4DSA, BEART, HGBAE, O1RPBARD, CARBOXHGBART in the last 72 hours. Impressions of imaging performed in 48 hours before discharge:    No results found.         Medication List      START taking these medications    carvedilol 6.25 MG tablet  Commonly known as: COREG  Take 1 tablet by mouth 2 times daily (with meals)     furosemide 40 MG tablet  Commonly known as: Lasix  Take 1 tablet by mouth daily     sacubitril-valsartan 24-26 MG per tablet  Commonly known as: ENTRESTO  Take 1 tablet by mouth 2 times daily     spironolactone 25 MG tablet  Commonly known as: ALDACTONE  Take 1 tablet by mouth daily  Start taking on: April 3, 2022        CONTINUE taking these medications    famotidine 10 MG tablet  Commonly known as: PEPCID     melatonin 3 MG Tabs tablet     Ventolin  (90 Base) MCG/ACT inhaler  Generic drug: albuterol sulfate HFA           Where to Get Your Medications      These medications were sent to [address], [address], [address]    Phone: [phone]   · carvedilol 6.25 MG tablet  · furosemide 40 MG tablet  · sacubitril-valsartan 24-26 MG per tablet  · spironolactone 25 MG tablet           ISSUES TO BE ADDRESSED AT HOSPITAL FOLLOW-UP VISIT:    Advised patient to follow-up closely with PCP upon discharge for management of chronic medical issues  Please see the detailed discharge directions delivered from earlier today! Condition on Discharge: stable  Discharge Disposition: Home    Recommended Follow Up:  DIANA Pearson NP  [address] [facility]  [phone]    On 4/25/2022  10:00 am     Hoa Galvan, [id number] [place], [place] [phone]          Followup Appointments Scheduled at Time of Discharge:  Future Appointments   Date Time Provider Lila Myers   4/25/2022 10:00 AM DIANA Pearson NP [facility]        Discharge Instructions:   Please see the discharge paperwork. Patient was seen at bedside today, and the examination shows improvement since yesterday. Detailed discharge directions delivered to the patient by myself and our nursing staff, who verbalizes understanding and is very happy and satisfied with the plan. Patient has been advised to continue all medications as prescribed and advised, and f/u with PCP within 1 week. Patient is stable from medical standpoint to be discharged.     Total time spent during patient evaluation and assessment, discussion with the nurse/family, addressing discharge medications/scripts and coordination of care for safe discharge was in excess of 35 minutes. Signed Electronically:    DIANA Menon CNP  10:53 AM 4/2/2022       Attestation Statement     I have independently seen and examined this patient and agree with the asesment and plan by mid level provider                                               Objective:   Vitals: /82   Pulse 107   Temp 97.9 °F (36.6 °C) (Temporal)   Resp 20   Ht 5' 6\" (1.676 m)   Wt 167 lb 12.8 oz (76.1 kg)   SpO2 94%   BMI 27.08 kg/m²   General appearance: alert, appears stated age and cooperative  Skin: Skin color, texture, turgor normal.   HEENT: Head: Normocephalic, no lesions, without obvious abnormality. Neck: no adenopathy, no carotid bruit, no JVD and supple, symmetrical, trachea midline  Lungs: clear to auscultation bilaterally  Heart: regular rate and rhythm, S1, S2 normal, no murmur, click, rub or gallop  Abdomen: soft, non-tender; bowel sounds normal; no masses,  no organomegaly  Extremities: extremities normal, atraumatic, no cyanosis or edema  Lymphatic: No significant lymph node enlargement papable  Neurologic: Mental status: Alert, oriented, thought content appropriate      Dc DX    1. Cardiogenic interstitial edema with BL pleural effusions  2. Tobacco abuse  3. Acute systolic CHF with EF of 48%  4. LBBB   5.  Nonischemic dilated cardiomyopathy - cardiac cath neg for CAD    Per cardiology started on Entresto, LifeVest per cardiology      Zina Schilder, MD

## 2022-04-02 NOTE — ED NOTES
Patient placed in a gown Patient placed on cardiac monitor, continuous pulse oximeter, and NIBP monitor.  Monitor alarms on.       Edwige Tijerina RN  04/02/22 [id number]

## 2022-04-02 NOTE — PROGRESS NOTES
Called [facility]. meds were about $11.00 each and t had coupon for entresto. meds were transferred to [facility] in [facility].  Pt will wear life vest.rachel boone

## 2022-04-02 NOTE — PROGRESS NOTES
Cardiology Daily Note Prasanna Gaston MD      Patient:  Octavia Vasquez  [id number]    Patient Active Problem List    Diagnosis Date Noted    Chest pain 03/30/2022    Acute HFrEF (heart failure with reduced ejection fraction) ([facility] 03/30/2022    Current smoker 03/30/2022       Admit Date:  3/30/2022    Admission Problem List: Present on Admission:   Chest pain   Acute HFrEF (heart failure with reduced ejection fraction) ([facility]   Current smoker      Cardiac Specific Data:  Specialty Problems        Cardiology Problems    * (Principal) Acute HFrEF (heart failure with reduced ejection fraction) ([facility]        Chest pain              Subjective:  Ms. Rosemary Yanez seen today underwent diagnostic catheterization yesterday normal coronary arteries severely dilated left ventricle with poor left ventricular function. Blood pressure 113/82 heart rate 107. Lisinopril changed to Entresto no other complaints or issues reported. Discussed with patient regarding LifeVest therapy and will ask the representative to evaluate her. No other complaints or issues reported. Possibly home later today. Objective:   /82   Pulse 107   Temp 97.9 °F (36.6 °C) (Temporal)   Resp 20   Ht 5' 6\" (1.676 m)   Wt 167 lb 12.8 oz (76.1 kg)   SpO2 94%   BMI 27.08 kg/m²       Intake/Output Summary (Last 24 hours) at 4/2/2022 1033  Last data filed at 4/2/2022 0391  Gross per 24 hour   Intake 1700 ml   Output 2200 ml   Net -500 ml       Prior to Admission medications    Medication Sig Start Date End Date Taking?  Authorizing Provider   melatonin 3 MG TABS tablet Take 10 mg by mouth at bedtime   Yes Historical Provider, MD   famotidine (PEPCID) 10 MG tablet Take 10 mg by mouth 2 times daily as needed   Yes Historical Provider, MD   albuterol sulfate HFA (VENTOLIN HFA) 108 (90 Base) MCG/ACT inhaler Inhale 2 puffs into the lungs every 6 hours as needed for Wheezing   Yes Historical Provider, MD        furosemide  40 mg IntraVENous Daily    carvedilol  6.25 mg Oral BID     spironolactone  25 mg Oral Daily    sacubitril-valsartan  1 tablet Oral BID    enoxaparin  40 mg SubCUTAneous Daily    ipratropium-albuterol  1 ampule Inhalation Q4H WA    sodium chloride flush  5-40 mL IntraVENous 2 times per day       TELEMETRY: Sinus     Physical Exam:      Physical Exam      General:  Awake, alert, NAD  Skin:  Warm and dry  Neck:  no jvd , no carotid bruits  Chest:  Clear to auscultation, no wheezing or rales  Cardiovascular:  RRR C7W1 no murmurs, clicks, gallups, or rubs  Abdomen:  Soft nontender, nondistended, bowel sounds present  Extremities:  Edema: none        Lab Data:  CBC:   Recent Labs     03/30/22  1351 03/31/22  0148 04/02/22  0948   WBC 8.8 10.7 11.8*   HGB 12.7 12.6 15.1   HCT 39.4 38.8 47.5*   MCV 99.7* 99.5* 101.3*    270 324     BMP:   Recent Labs     03/31/22  0148 04/01/22  0457 04/02/22  0153    137 139   K 4.5 4.7 4.5   CL 96* 97* 101   CO2 23 27 24   BUN 24* 32* 24*   CREATININE 0.9 1.1* 0.9     LIVER PROFILE:   Recent Labs     03/30/22  1351   AST 26   ALT 48*   BILITOT 0.4   ALKPHOS 139*     PT/INR:   Recent Labs     03/30/22  1351   PROTIME 13.3   INR 0.99     APTT:   Recent Labs     03/30/22  1351   APTT 26.3     BNP:  No results for input(s): BNP in the last 72 hours.   CK, CKMB, Troponin: @LABRCNT (CKTOTAL:3, CKMB:3, TROPONINI:3)@    IMAGING:  ECHO Complete 2D W Doppler W Color    Result Date: 3/30/2022  Transthoracic Echocardiography Report (TTE)  Demographics   Patient Name  Panda Donato Date of Study          03/30/2022   MRN           [id number]        Gender                 Female   Date of Birth 1960    Room Number            [facility]   Age           63 year(s)   Height:       66 inches     Referring Physician     Spittle   Weight:       170 pounds    Sonographer            Amber Omalley, [facility]   BSA:          1.87 m^2      Interpreting Physician Amanda Basurto DO   BMI:          27.44 kg/m^2 Procedure Type of Study   TTE procedure:ECHO 2D W/DOPPLER/COLOR/CONTRAST. Study Location: Echo Lab Technical Quality: Adequate visualization Patient Status: Inpatient Contrast Medium: Definity. Amount - 8 ml BP: 114/48 mmHg Indications:Chest pain. Conclusions   Summary  Left ventricular chamber size is severely dilated. .  Mild concentric left ventricular hypertrophy. Left ventricular systolic function is severely reduced  Severe left ventricular global hypokinesis. Left ventricular ejection fraction is visually estimated at 15%. There is mild mitral regurgitation. The left atrium is moderately dilated. Signature   ----------------------------------------------------------------  Electronically signed by Adebayo Fraser DO(Interpreting  physician) on 03/30/2022 04:55 PM  ----------------------------------------------------------------   Findings   Mitral Valve  Structurally normal mitral valve leaflets. There is mild mitral regurgitation. Aortic Valve  Structurally normal aortic valve. No aortic regurgitation . Tricuspid Valve  Normal tricuspid valve leaflet thickness and excursion. There is trace tricuspid regurgitation. Estimated PA systolic pressure is 50GE mercury. Pulmonic Valve  No significant pulmonic regurgitation. Left Atrium  The left atrium is moderately dilated. Left Ventricle  Left ventricular chamber size is severely dilated. .  Mild concentric left ventricular hypertrophy. Left ventricular systolic function is severely reduced  Severe left ventricular global hypokinesis. Paradoxical septal motion consistent with left bundle branch block . Left ventricular ejection fraction is visually estimated at 15%. No evidence of left ventricular mass or thrombus noted. Right Atrium  Normal right atrial size. Right Ventricle  Normal right ventricular chamber size and function. Pericardial Effusion  No pericardial effusion.    Miscellaneous  IVC diameter is normal, suggesting normal right atrial pressure. M-Mode Measurements (cm)   LVIDd: 6.29 cm                         LVIDs: 5.7 cm  IVSd: 1.26 cm  LVPWd: 1.23 cm                         AO Root Dimension: 2.2 cm  Rt. Vent. Dimension: 2.73 cm           LA: 4.5 cm  % Ejection Fraction: 15 %              LVOT: 2 cm  Doppler Measurements:   AV Peak Velocity:174 cm/s            MV Peak E-Wave: 147 cm/s  AV Peak Gradient: 12.11 mmHg  AV Mean Gradient: 6 mmHg             MV Peak Gradient: 8.64 mmHg  AV Area (Continuity):1.81 cm^2  TR Velocity:284 cm/s  TR Gradient:32.26 mmHg  Estimated RAP:3 mmHg  RVSP:35 mmHg      XR CHEST PORTABLE    Result Date: 3/30/2022  XR CHEST PORTABLE 3/30/2022 1:33 PM HISTORY: Chest pain  Technique: Single AP view of the chest COMPARISONS: None FINDINGS: No lung consolidation. No pleural effusion or pneumothorax. Underlying cardiomegaly. Central pulmonary vessels are nondilated. Right shoulder arthroplasty. No acute bony abnormality. No acute cardiopulmonary process. Signed by Dr Darnell Dacosta    Result Date: 3/30/2022  CTA PULMONARY W CONTRAST 3/30/2022 3:19 PM HISTORY: Cough, elevated d-dimer COMPARISON: Chest x-ray dated 3/30/2022. DLP: 954 mGy cm TECHNIQUE: Helical tomographic images of the chest were obtained after the administration of intravenous contrast following angiogram protocol. Additionally, 3D MIP reconstructions in the coronal and sagittal planes were provided. Automated exposure control was also utilized to decrease patient radiation dose. FINDINGS:  Pulmonary arteries: There is adequate enhancement of the pulmonary arteries to evaluate for central and segmental pulmonary emboli. There are no filling defects within the main, lobar, segmental or visualized subsegmental pulmonary arteries. The pulmonary vessels are within normal limits for size. Aorta and great vessels:  The aorta is not well opacified with contrast due to the phase of the exam.. Minimal calcified plaque in the arch. Minimal coronary artery calcifications are visualized. Neck base: The imaged portion of the base of the neck appears unremarkable. Lungs: There are a few small centrilobular thin-walled cysts which appears to be a mild centrilobular emphysema. Bilateral interlobular septal thickening and peribronchial thickening. Trace bilateral pleural effusion. Airways are clear. Heart: Left ventricle is quite dilated. There is no pericardial effusion. Lymph nodes: No pathologically enlarged mediastinal, hilar, or axillary lymph nodes are present. Bones and soft tissues: Incidentally noted bilateral breast augmentation. The osseous structures of the thorax and surrounding soft tissues demonstrate no acute process. Facet arthropathy. Upper abdomen: The imaged portion of the upper abdomen demonstrates no acute process. Low density right adrenal adenoma. 1. No evidence of pulmonary embolus. 2. Left ventricle is quite dilated, suspect dilated cardiomyopathy. There is an associated cardiogenic interstitial edema as well as bilateral trace pleural effusions. Signed by Dr Haven Casas        Assessment:  1. Several month history of intermittent complaints of nocturnal dyspnea and throat pressure  2. CTA pulmonary no evidence of pulmonary embolism dilated left ventricle associated cardiogenic interstitial edema bilateral trace pleural effusions  3. Tobacco abuse ongoing  4. Complaints of exertional shortness of breath and vague chest tightness suggestive of angina  5. Abnormal echocardiogram ejection fraction estimated 15% severe global hypokinesis mild MR dilated left atrium  6. Abnormal electrocardiogram sinus rhythm with left bundle branch block  7. Complaints of bilateral calf discomfort  8. Symptoms of intermittent seasonal allergies  9.  Cardiac catheterization yesterday unremarkable coronary arteries severely dilated left ventricle with ejection fraction 15% consistent with nonischemic dilated cardiomyopathy       Plan:  1. Entresto started tolerating well. 2. Discussed with the patient regarding possible LifeVest will last representative to evaluate her and discuss further  3.  Ambulate today could be potentially discharged later today or tomorrow depending on how she is feeling follow-up in the office    Noah Holliday MD, MD 4/2/2022 10:33 AM

## 2022-04-02 NOTE — PROGRESS NOTES
PTS OXYGEN SATURATION WAS 98 % ON RA AT REST . PT THEN WALKED IN HALLWAY ON RA SATURATION DROPPED TO 97% .

## 2022-04-02 NOTE — ED PROVIDER NOTES
Negative for color change and rash. Neurological: Positive for syncope. Negative for dizziness, facial asymmetry and headaches. Hematological: Negative for adenopathy. Does not bruise/bleed easily. Psychiatric/Behavioral: Negative for agitation, behavioral problems and confusion. Except as noted above the remainder of the review of systems was reviewed and negative.        PAST MEDICAL HISTORY     Past Medical History:   Diagnosis Date    Heart failure ([address]     reduced ejection fx         SURGICAL HISTORY       Past Surgical History:   Procedure Laterality Date    ANKLE SURGERY Left     CARDIAC CATHETERIZATION       SECTION      SHOULDER SURGERY Right          CURRENT MEDICATIONS       Discharge Medication List as of 2022  7:31 PM      CONTINUE these medications which have NOT CHANGED    Details   carvedilol (COREG) 6.25 MG tablet Take 1 tablet by mouth 2 times daily (with meals), Disp-60 tablet, R-3Normal      sacubitril-valsartan (ENTRESTO) 24-26 MG per tablet Take 1 tablet by mouth 2 times daily, Disp-60 tablet, R-0Normal      spironolactone (ALDACTONE) 25 MG tablet Take 1 tablet by mouth daily, Disp-30 tablet, R-0Normal      furosemide (LASIX) 40 MG tablet Take 1 tablet by mouth daily, Disp-30 tablet, R-0Normal      ipratropium-albuterol (DUONEB) 0.5-2.5 (3) MG/3ML SOLN nebulizer solution Inhale 3 mLs into the lungs every 4 hours as needed for Shortness of Breath, Disp-90 mL, R-0Normal      melatonin 3 MG TABS tablet Take 10 mg by mouth at bedtimeHistorical Med      famotidine (PEPCID) 10 MG tablet Take 10 mg by mouth 2 times daily as neededHistorical Med      albuterol sulfate HFA (VENTOLIN HFA) 108 (90 Base) MCG/ACT inhaler Inhale 2 puffs into the lungs every 6 hours as needed for WheezingHistorical Med             ALLERGIES     Morphine, Nickel, and Fish allergy    FAMILY HISTORY       Family History   Problem Relation Age of Onset    High Blood Pressure Father           SOCIAL both tasks correctly  Visual (3): No visual loss  Facial Palsy (4): Normal symmetrical movement  Motor Arm, Left (5a): No drift  Motor Arm, Right (5b): No drift  Motor Leg, Left (6a): No drift  Motor Leg, Right (6b): No drift  Limb Ataxia (7): Absent  Sensory (8): Normal  Best Language (9): No aphasia  Dysarthria (10): Normal  Extinction and Inattention (11): No abnormalityGlasgow Coma Scale  Eye Opening: Spontaneous  Best Verbal Response: Oriented  Best Motor Response: Obeys commands  Leisa Coma Scale Score: 15      PHYSICAL EXAM    (up to 7 forlevel 4, 8 or more for level 5)     ED Triage Vitals [04/02/22 1647]   BP Temp Temp src Pulse Resp SpO2 Height Weight   91/64 98 °F (36.7 °C) -- 98 20 92 % 5' 6\" (1.676 m) 167 lb (75.8 kg)       Physical Exam  Vitals and nursing note reviewed. Constitutional:       General: She is not in acute distress. Appearance: Normal appearance. She is well-developed. She is not diaphoretic. HENT:      Head: Normocephalic and atraumatic. Right Ear: Tympanic membrane, ear canal and external ear normal.      Left Ear: Tympanic membrane, ear canal and external ear normal.      Mouth/Throat:      Pharynx: No oropharyngeal exudate. Eyes:      General:         Right eye: No discharge. Left eye: No discharge. Pupils: Pupils are equal, round, and reactive to light. Neck:      Thyroid: No thyromegaly. Cardiovascular:      Rate and Rhythm: Normal rate and regular rhythm. Heart sounds: Normal heart sounds. No murmur heard. No friction rub. Pulmonary:      Effort: Pulmonary effort is normal. No respiratory distress. Breath sounds: Normal breath sounds. No stridor. No wheezing. Abdominal:      General: Bowel sounds are normal. There is no distension. Palpations: Abdomen is soft. Tenderness: There is no abdominal tenderness. Musculoskeletal:         General: Normal range of motion. Cervical back: Normal range of motion and neck supple. Skin:     General: Skin is warm and dry. Capillary Refill: Capillary refill takes less than 2 seconds. Findings: No rash. Neurological:      Mental Status: She is alert and oriented to person, place, and time. Cranial Nerves: No cranial nerve deficit. Sensory: No sensory deficit. Coordination: Coordination normal.   Psychiatric:         Behavior: Behavior normal.         Thought Content: Thought content normal.           DIAGNOSTIC RESULTS     RADIOLOGY:   Non-plain film images such as CT, Ultrasound and MRI are read by the radiologist. Plain radiographic images are visualized and preliminarilyinterpreted by No att. providers found with the below findings:      Interpretation per the Radiologist below, if available at the time of this note:    XR CHEST PORTABLE   Final Result   No acute findings. Decreased size of cardiac silhouette. Signed by Dr Jw San:  Labs Reviewed   CBC WITH AUTO DIFFERENTIAL - Abnormal; Notable for the following components:       Result Value    WBC 14.5 (*)     MCV 99.5 (*)     MCH 32.7 (*)     MCHC 32.9 (*)     Neutrophils Absolute 8.7 (*)     Lymphocytes Absolute 4.6 (*)     All other components within normal limits   COMPREHENSIVE METABOLIC PANEL - Abnormal; Notable for the following components:    Sodium 132 (*)     CO2 21 (*)     Glucose 114 (*)     BUN 26 (*)     Total Protein 6.5 (*)     Alkaline Phosphatase 117 (*)     All other components within normal limits   BRAIN NATRIURETIC PEPTIDE - Abnormal; Notable for the following components:    Pro-BNP 2,169 (*)     All other components within normal limits   TROPONIN       All other labs were within normal range or notreturned as of this dictation.     RE-ASSESSMENT        EMERGENCY DEPARTMENT COURSE and DIFFERENTIAL DIAGNOSIS/MDM:   Vitals:    Vitals:    04/02/22 1645 04/02/22 1647 04/02/22 1734   BP:  91/64 103/74   Pulse: 94 98 88   Resp:  20 20   Temp:  98 °F (36.7 °C)    SpO2:  92% 96%   Weight:  167 lb (75.8 kg)    Height:  5' 6\" (1.676 m)        EKG show sinus rhythm normal 95 rate left bundle branch block similar to 3/30/22 for comparison signed off by my attending. MDM  Patient remains medically stable here with no hypotension or tachycardia witnessed she has no hypoxia on room air she is passing p.o. challenge asymptomatic and feeling much better. I spoke to the on-call cardiologist who just discharged her Dr. Klarissa Esipno recommends attempting to get her a LifeVest here we spoke with the [facility] who tells me that the patient does not have insurance and they are trying to get that overridden by getting documentation from her cardiologist.  The plan tentatively is for that to be done on this Monday. I spoke to the heart doctor again who recommends that she not take her Coreg or her Entresto at the same time it sounds like she was given all 4 medications prior to her discharge today we are of the opinion that may have created the syncopal event. After discussion for observing she declines wants to try going home and understands to take her medicines 1 hour apart. She understands to return here acutely if anything should change I feel that that would warrant an admission at that time. Otherwise I think she has a good plan in place. My attending made aware of this patient's work-up plan will be for discharge. He is agreeable. PROCEDURES:    Procedures      FINAL IMPRESSION      1.  Syncope and collapse          DISPOSITION/PLAN   DISPOSITION Decision To Discharge 04/02/2022 07:29:14 PM      PATIENT REFERRED TO:  [facility] EMERGENCY DEPT  Brooks Barraza  [phone]    If symptoms worsen      DISCHARGE MEDICATIONS:  Discharge Medication List as of 4/2/2022  7:31 PM          (Please note that portions of this note were completed with a voice recognition program.  Efforts were made to edit the dictations but occasionallywords are mis-transcribed.)    Shaheen Miller AuthAlesha Bosch 986, [place]  32/41/10 [id number]

## 2022-04-04 ENCOUNTER — TELEPHONE (OUTPATIENT)
Dept: CARDIOLOGY CLINIC | Age: 62
End: 2022-04-04

## 2022-04-04 LAB
EKG P AXIS: 64 DEGREES
EKG P-R INTERVAL: 138 MS
EKG Q-T INTERVAL: 400 MS
EKG QRS DURATION: 150 MS
EKG QTC CALCULATION (BAZETT): 461 MS
EKG T AXIS: 123 DEGREES

## 2022-04-04 PROCEDURE — 93010 ELECTROCARDIOGRAM REPORT: CPT | Performed by: INTERNAL MEDICINE

## 2022-04-04 NOTE — TELEPHONE ENCOUNTER
Spoke with pt she states she feels \"heavier in the am in chest area\". Pt states she gets up at 6 am and doesn't take her lasix till 8 am. Pt hasn't gained any weight and doesn't have any swelling. No SOA. Pt takes lasix 40 mg qd. Pt wants to know if she can take the lasix earlier than 8 am, like the hospital told her to.

## 2022-04-04 NOTE — TELEPHONE ENCOUNTER
Patient states that she doesn't feel like she her lasik's dosage is high enough patient states she still feels heavy in the mornings and is wondering if she needs more, patient isn't urinating as much as in the hospital  Please call pt to advise  A good call back time is anytime.    Thank you

## 2022-04-06 LAB
HIV INTERPRETATION: NEGATIVE
HIV-1 ANTIBODY: NEGATIVE
HIV-2 AB: NEGATIVE

## 2022-04-08 ENCOUNTER — APPOINTMENT (OUTPATIENT)
Dept: GENERAL RADIOLOGY | Age: 62
End: 2022-04-08

## 2022-04-08 ENCOUNTER — HOSPITAL ENCOUNTER (EMERGENCY)
Age: 62
Discharge: HOME | End: 2022-04-08
Attending: EMERGENCY MEDICINE

## 2022-04-08 VITALS
SYSTOLIC BLOOD PRESSURE: 120 MMHG | RESPIRATION RATE: 20 BRPM | OXYGEN SATURATION: 98 % | BODY MASS INDEX: 26.84 KG/M2 | HEIGHT: 66 IN | TEMPERATURE: 98.8 F | WEIGHT: 167 LBS | DIASTOLIC BLOOD PRESSURE: 76 MMHG | HEART RATE: 78 BPM

## 2022-04-08 DIAGNOSIS — J44.1 COPD EXACERBATION (HCC): Primary | ICD-10-CM

## 2022-04-08 DIAGNOSIS — E83.51 HYPOCALCEMIA: ICD-10-CM

## 2022-04-08 DIAGNOSIS — E86.0 DEHYDRATION: ICD-10-CM

## 2022-04-08 DIAGNOSIS — E87.6 HYPOKALEMIA: ICD-10-CM

## 2022-04-08 LAB
ALBUMIN SERPL-MCNC: 2.3 G/DL (ref 3.5–5.2)
ALP BLD-CCNC: 58 U/L (ref 35–104)
ALT SERPL-CCNC: 8 U/L (ref 5–33)
ANION GAP SERPL CALCULATED.3IONS-SCNC: 12 MMOL/L (ref 7–19)
AST SERPL-CCNC: 13 U/L (ref 5–32)
BASOPHILS ABSOLUTE: 0.1 K/UL (ref 0–0.2)
BASOPHILS RELATIVE PERCENT: 0.4 % (ref 0–1)
BILIRUB SERPL-MCNC: <0.2 MG/DL (ref 0.2–1.2)
BUN BLDV-MCNC: 22 MG/DL (ref 6–20)
CALCIUM SERPL-MCNC: 5.1 MG/DL (ref 8.6–10)
CHLORIDE BLD-SCNC: 115 MMOL/L (ref 98–111)
CO2: 13 MMOL/L (ref 22–29)
CREAT SERPL-MCNC: 0.3 MG/DL (ref 0.5–0.9)
EKG P AXIS: 76 DEGREES
EKG P-R INTERVAL: 142 MS
EKG Q-T INTERVAL: 408 MS
EKG QRS DURATION: 160 MS
EKG QTC CALCULATION (BAZETT): 460 MS
EKG T AXIS: 125 DEGREES
EOSINOPHILS ABSOLUTE: 0.1 K/UL (ref 0–0.6)
EOSINOPHILS RELATIVE PERCENT: 0.8 % (ref 0–5)
GFR AFRICAN AMERICAN: >59
GFR NON-AFRICAN AMERICAN: >60
GLUCOSE BLD-MCNC: 79 MG/DL (ref 74–109)
HCT VFR BLD CALC: 42.7 % (ref 37–47)
HEMOGLOBIN: 14.2 G/DL (ref 12–16)
IMMATURE GRANULOCYTES #: 0 K/UL
LYMPHOCYTES ABSOLUTE: 5 K/UL (ref 1.1–4.5)
LYMPHOCYTES RELATIVE PERCENT: 34.6 % (ref 20–40)
MCH RBC QN AUTO: 32.9 PG (ref 27–31)
MCHC RBC AUTO-ENTMCNC: 33.3 G/DL (ref 33–37)
MCV RBC AUTO: 99.1 FL (ref 81–99)
MONOCYTES ABSOLUTE: 0.9 K/UL (ref 0–0.9)
MONOCYTES RELATIVE PERCENT: 6.5 % (ref 0–10)
NEUTROPHILS ABSOLUTE: 8.3 K/UL (ref 1.5–7.5)
NEUTROPHILS RELATIVE PERCENT: 57.4 % (ref 50–65)
PDW BLD-RTO: 13.7 % (ref 11.5–14.5)
PLATELET # BLD: 286 K/UL (ref 130–400)
PMV BLD AUTO: 9.5 FL (ref 9.4–12.3)
POTASSIUM SERPL-SCNC: 3.4 MMOL/L (ref 3.5–5)
PRO-BNP: 955 PG/ML (ref 0–900)
RBC # BLD: 4.31 M/UL (ref 4.2–5.4)
SARS-COV-2, NAAT: NOT DETECTED
SODIUM BLD-SCNC: 140 MMOL/L (ref 136–145)
TOTAL PROTEIN: 3.8 G/DL (ref 6.6–8.7)
TROPONIN: <0.01 NG/ML (ref 0–0.03)
WBC # BLD: 14.4 K/UL (ref 4.8–10.8)

## 2022-04-08 PROCEDURE — 84484 ASSAY OF TROPONIN QUANT: CPT

## 2022-04-08 PROCEDURE — 71045 X-RAY EXAM CHEST 1 VIEW: CPT

## 2022-04-08 PROCEDURE — 6370000000 HC RX 637 (ALT 250 FOR IP): Performed by: EMERGENCY MEDICINE

## 2022-04-08 PROCEDURE — 83880 ASSAY OF NATRIURETIC PEPTIDE: CPT

## 2022-04-08 PROCEDURE — 96374 THER/PROPH/DIAG INJ IV PUSH: CPT

## 2022-04-08 PROCEDURE — 96375 TX/PRO/DX INJ NEW DRUG ADDON: CPT

## 2022-04-08 PROCEDURE — 87635 SARS-COV-2 COVID-19 AMP PRB: CPT

## 2022-04-08 PROCEDURE — 96361 HYDRATE IV INFUSION ADD-ON: CPT

## 2022-04-08 PROCEDURE — 2580000003 HC RX 258: Performed by: EMERGENCY MEDICINE

## 2022-04-08 PROCEDURE — 93005 ELECTROCARDIOGRAM TRACING: CPT | Performed by: EMERGENCY MEDICINE

## 2022-04-08 PROCEDURE — 99284 EMERGENCY DEPT VISIT MOD MDM: CPT

## 2022-04-08 PROCEDURE — 36415 COLL VENOUS BLD VENIPUNCTURE: CPT

## 2022-04-08 PROCEDURE — 94640 AIRWAY INHALATION TREATMENT: CPT

## 2022-04-08 PROCEDURE — 80053 COMPREHEN METABOLIC PANEL: CPT

## 2022-04-08 PROCEDURE — 85025 COMPLETE CBC W/AUTO DIFF WBC: CPT

## 2022-04-08 PROCEDURE — 6360000002 HC RX W HCPCS: Performed by: EMERGENCY MEDICINE

## 2022-04-08 RX ORDER — CALCIUM GLUCONATE 94 MG/ML
2000 INJECTION, SOLUTION INTRAVENOUS ONCE
Status: COMPLETED | OUTPATIENT
Start: 2022-04-08 | End: 2022-04-08

## 2022-04-08 RX ORDER — DOXYCYCLINE HYCLATE 100 MG
100 TABLET ORAL 2 TIMES DAILY
Qty: 14 TABLET | Refills: 0 | Status: SHIPPED | OUTPATIENT
Start: 2022-04-08 | End: 2022-04-15

## 2022-04-08 RX ORDER — METHYLPREDNISOLONE 4 MG/1
TABLET ORAL
Qty: 1 KIT | Refills: 0 | Status: SHIPPED | OUTPATIENT
Start: 2022-04-08 | End: 2022-04-25

## 2022-04-08 RX ORDER — METHYLPREDNISOLONE SODIUM SUCCINATE 125 MG/2ML
80 INJECTION, POWDER, LYOPHILIZED, FOR SOLUTION INTRAMUSCULAR; INTRAVENOUS ONCE
Status: COMPLETED | OUTPATIENT
Start: 2022-04-08 | End: 2022-04-08

## 2022-04-08 RX ORDER — NYSTATIN 100000 U/G
CREAM TOPICAL
Qty: 1 EACH | Refills: 0 | Status: ON HOLD | OUTPATIENT
Start: 2022-04-08 | End: 2022-08-10

## 2022-04-08 RX ORDER — SODIUM CHLORIDE, SODIUM LACTATE, POTASSIUM CHLORIDE, AND CALCIUM CHLORIDE .6; .31; .03; .02 G/100ML; G/100ML; G/100ML; G/100ML
250 INJECTION, SOLUTION INTRAVENOUS ONCE
Status: DISCONTINUED | OUTPATIENT
Start: 2022-04-08 | End: 2022-04-08

## 2022-04-08 RX ORDER — IPRATROPIUM BROMIDE AND ALBUTEROL SULFATE 2.5; .5 MG/3ML; MG/3ML
1 SOLUTION RESPIRATORY (INHALATION) ONCE
Status: COMPLETED | OUTPATIENT
Start: 2022-04-08 | End: 2022-04-08

## 2022-04-08 RX ORDER — SODIUM CHLORIDE, SODIUM LACTATE, POTASSIUM CHLORIDE, AND CALCIUM CHLORIDE .6; .31; .03; .02 G/100ML; G/100ML; G/100ML; G/100ML
500 INJECTION, SOLUTION INTRAVENOUS ONCE
Status: COMPLETED | OUTPATIENT
Start: 2022-04-08 | End: 2022-04-08

## 2022-04-08 RX ADMIN — CALCIUM GLUCONATE 2000 MG: 98 INJECTION, SOLUTION INTRAVENOUS at 05:54

## 2022-04-08 RX ADMIN — IPRATROPIUM BROMIDE AND ALBUTEROL SULFATE 1 AMPULE: 2.5; .5 SOLUTION RESPIRATORY (INHALATION) at 06:28

## 2022-04-08 RX ADMIN — POTASSIUM BICARBONATE 40 MEQ: 782 TABLET, EFFERVESCENT ORAL at 05:54

## 2022-04-08 RX ADMIN — METHYLPREDNISOLONE SODIUM SUCCINATE 80 MG: 125 INJECTION, POWDER, FOR SOLUTION INTRAMUSCULAR; INTRAVENOUS at 06:11

## 2022-04-08 RX ADMIN — SODIUM CHLORIDE, POTASSIUM CHLORIDE, SODIUM LACTATE AND CALCIUM CHLORIDE 500 ML: 600; 310; 30; 20 INJECTION, SOLUTION INTRAVENOUS at 06:08

## 2022-04-08 ASSESSMENT — ENCOUNTER SYMPTOMS
SORE THROAT: 0
BACK PAIN: 0
DIARRHEA: 0
SHORTNESS OF BREATH: 1
VOMITING: 0
COUGH: 1
NAUSEA: 0
ABDOMINAL PAIN: 0
RHINORRHEA: 0

## 2022-04-08 NOTE — ED NOTES
Patient placed on cardiac monitor, continuous pulse oximeter, and NIBP monitor. Monitor alarms on.        Linda Hinkle RN  04/08/22 92

## 2022-04-08 NOTE — ED PROVIDER NOTES
[facility] EMERGENCY DEPT  eMERGENCY dEPARTMENT eNCOUnter      Pt Name: Sivakumar Sorensen  MRN: [id number]  [place] 1960  Date of evaluation: 4/8/2022  Provider: Amirah Yeboah MD    [address] [place] [address]       Chief Complaint   Patient presents with    Cough    Shortness of Breath     40mg lasix, 324 ASA, 4mg zofran, albuterol given per EMS         HISTORY OF PRESENT ILLNESS   (Location/Symptom, Timing/Onset,Context/Setting, Quality, Duration, Modifying Factors, Severity)  Note limiting factors. Sivakumar Sorensen is a 63 y.o. female who presents to the emergency department for cough and shortness of breath worsening since yesterday. She had gone to bed around 8 PM the night woke around 9 the night with chest tightness and increased dyspnea. Her cough is nonproductive and she denies any fevers or chills. The Mary Li is already getting very anxious and called EMS and in route was given a nebulizer treatment as well as 40 mg of Lasix and tells me she is feeling much better. 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 She is wearing a LifeVest currently. She was admitted from March 30 to April 2 and actually return back to the emergency department on her same day of discharge after having a syncopal event which was attributed to possibly being given all of her medications at the same time. No further syncopal events. HPI    NursingNotes were reviewed. REVIEW OF SYSTEMS    (2-9 systems for level 4, 10 or more for level 5)     Review of Systems   Constitutional: Negative for chills and fever. HENT: Negative for rhinorrhea and sore throat. Respiratory: Positive for cough and shortness of breath. Cardiovascular: Negative for chest pain and leg swelling. Gastrointestinal: Negative for abdominal pain, diarrhea, nausea and vomiting. Genitourinary: Negative for dysuria, frequency and urgency.    Musculoskeletal: Negative for back pain and neck pain.   Neurological: Negative for dizziness and headaches. All other systems reviewed and are negative.            PAST MEDICALHISTORY     Past Medical History:   Diagnosis Date    Heart failure ([address]     reduced ejection fx         SURGICAL HISTORY       Past Surgical History:   Procedure Laterality Date    ANKLE SURGERY Left     CARDIAC CATHETERIZATION      CARDIAC CATHETERIZATION       SECTION      SHOULDER SURGERY Right          CURRENT MEDICATIONS     Previous Medications    ALBUTEROL SULFATE HFA (VENTOLIN HFA) 108 (90 BASE) MCG/ACT INHALER    Inhale 2 puffs into the lungs every 6 hours as needed for Wheezing    CARVEDILOL (COREG) 6.25 MG TABLET    Take 1 tablet by mouth 2 times daily (with meals)    FAMOTIDINE (PEPCID) 10 MG TABLET    Take 10 mg by mouth 2 times daily as needed    FUROSEMIDE (LASIX) 40 MG TABLET    Take 1 tablet by mouth daily    IPRATROPIUM-ALBUTEROL (DUONEB) 0.5-2.5 (3) MG/3ML SOLN NEBULIZER SOLUTION    Inhale 3 mLs into the lungs every 4 hours as needed for Shortness of Breath    MELATONIN 3 MG TABS TABLET    Take 10 mg by mouth at bedtime    SACUBITRIL-VALSARTAN (ENTRESTO) 24-26 MG PER TABLET    Take 1 tablet by mouth 2 times daily    SPIRONOLACTONE (ALDACTONE) 25 MG TABLET    Take 1 tablet by mouth daily       ALLERGIES     Morphine, Nickel, and Fish allergy    FAMILY HISTORY       Family History   Problem Relation Age of Onset    High Blood Pressure Father           SOCIAL HISTORY       Social History     Socioeconomic History    Marital status:      Spouse name: None    Number of children: None    Years of education: None    Highest education level: None   Occupational History    None   Tobacco Use    Smoking status: Current Every Day Smoker     Packs/day: 0.50     Types: Cigarettes    Smokeless tobacco: Never Used   Vaping Use    Vaping Use: Never used   Substance and Sexual Activity    Alcohol use: Yes     Comment: occ    Drug use: Never    Sexual activity: None   Other Topics Concern    None   Social History Narrative    None     Social Determinants of Health     Financial Resource Strain:     Difficulty of Paying Living Expenses: Not on file   Food Insecurity:     Worried About Running Out of Food in the Last Year: Not on file    Mirlea of Food in the Last Year: Not on file   Transportation Needs:     Lack of Transportation (Medical): Not on file    Lack of Transportation (Non-Medical): Not on file   Physical Activity:     Days of Exercise per Week: Not on file    Minutes of Exercise per Session: Not on file   Stress:     Feeling of Stress : Not on file   Social Connections:     Frequency of Communication with Friends and Family: Not on file    Frequency of Social Gatherings with Friends and Family: Not on file    Attends Jehovah's witness Services: Not on file    Active Member of [address] [place] [address] [employer] or Organizations: Not on file    Attends Club or Organization Meetings: Not on file    Marital Status: Not on file   Intimate Partner Violence:     Fear of Current or Ex-Partner: Not on file    Emotionally Abused: Not on file    Physically Abused: Not on file    Sexually Abused: Not on file   Housing Stability:     Unable to Pay for Housing in the Last Year: Not on file    Number of [place] in the Last Year: Not on file    Unstable Housing in the Last Year: Not on file       SCREENINGS    [place] Coma Scale  Eye Opening: Spontaneous  Best Verbal Response: Oriented  Best Motor Response: Obeys commands  [place] Coma Scale Score: 15        PHYSICAL EXAM    (up to 7 for level 4, 8 or more for level 5)     ED Triage Vitals   BP Temp Temp src Pulse Resp SpO2 Height Weight   04/08/22 0432 04/08/22 0432 -- 04/08/22 0432 04/08/22 0432 04/08/22 0426 04/08/22 0426 04/08/22 0426   105/60 97.2 °F (36.2 °C)  89 17 96 % 5' 6\" (1.676 m) 167 lb (75.8 kg)       Physical Exam  Vitals and nursing note reviewed.    Constitutional:       General: She is not in acute distress. Appearance: She is well-developed. She is ill-appearing. HENT:      Head: Normocephalic and atraumatic. Right Ear: External ear normal.      Left Ear: External ear normal.      Nose: Nose normal.      Mouth/Throat:      Mouth: Mucous membranes are dry. Comments: Right lateral corner of mouth cracked  Eyes:      Conjunctiva/sclera: Conjunctivae normal.   Neck:      Trachea: No tracheal deviation. Cardiovascular:      Rate and Rhythm: Normal rate and regular rhythm. Pulses: Normal pulses. Heart sounds: Normal heart sounds. No murmur heard. Pulmonary:      Effort: Pulmonary effort is normal. No respiratory distress. Breath sounds: Examination of the right-middle field reveals wheezing. Examination of the left-middle field reveals wheezing. Examination of the right-lower field reveals decreased breath sounds and wheezing. Examination of the left-lower field reveals decreased breath sounds and wheezing. Decreased breath sounds and wheezing present. No rales. Comments: Wearing life vest  Abdominal:      Palpations: Abdomen is soft. There is no mass. Tenderness: There is no abdominal tenderness. Musculoskeletal:         General: Normal range of motion. Cervical back: Normal range of motion. Right lower leg: No edema. Left lower leg: No edema. Skin:     General: Skin is warm and dry. Neurological:      Mental Status: She is alert and oriented to person, place, and time. GCS: GCS eye subscore is 4. GCS verbal subscore is 5. GCS motor subscore is 6.          DIAGNOSTIC RESULTS     EKG: All EKG's areinterpreted by the Emergency Department Physician who either signs or Co-signs this chart in the absence of a cardiologist.    91 normal sinus rhythm left bundle branch block  nondiagnostic EKG    RADIOLOGY:  Non-plain film images such as CT, Ultrasound and MRI are read by the radiologist. Plain radiographic images are visualized and preliminarily interpreted bythe emergency physician with the below findings:      XR CHEST PORTABLE   Final Result   1. No active cardiopulmonary disease. Signed by Dr Alan Quezada:  Gerhardt Bernarder - Abnormal; Notable for the following components:       Result Value    Pro- (*)     All other components within normal limits   CBC WITH AUTO DIFFERENTIAL - Abnormal; Notable for the following components:    WBC 14.4 (*)     MCV 99.1 (*)     MCH 32.9 (*)     Neutrophils Absolute 8.3 (*)     Lymphocytes Absolute 5.0 (*)     All other components within normal limits   COMPREHENSIVE METABOLIC PANEL - Abnormal; Notable for the following components:    Potassium 3.4 (*)     Chloride 115 (*)     CO2 13 (*)     BUN 22 (*)     CREATININE 0.3 (*)     Calcium 5.1 (*)     Total Protein 3.8 (*)     Albumin 2.3 (*)     All other components within normal limits    Narrative:     CALL  Jara  KLED tel. ,  Chemistry results called to and read back by [facility] RN in ED, 04/08/2022 05:24,  by [facility] [place] [facility]   COVID-19, RAPID   TROPONIN       All other labs were within normal range or not returned as of this dictation. EMERGENCY DEPARTMENT COURSE and DIFFERENTIAL DIAGNOSIS/MDM:   Vitals:    Vitals:    04/08/22 0426 04/08/22 0432 04/08/22 0523   BP:  105/60 95/60   Pulse:  89 87   Resp:  17    Temp:  97.2 °F (36.2 °C)    SpO2: 96% 95%    Weight: 167 lb (75.8 kg)     Height: 5' 6\" (1.676 m)         MDM  Number of Diagnoses or Management Options     Amount and/or Complexity of Data Reviewed  Clinical lab tests: ordered and reviewed  Tests in the radiology section of CPT®: ordered and reviewed  Independent visualization of images, tracings, or specimens: yes      VSS, wheezing on exam, pt is not overloaded clinically here, cxr clear, bnp much improved, does have metabolic acidosis and hypoK hypocalcemic suspect likely slightly dry from hospitalization and ongoing outpt diuresis.  Given 500cc fluid here only as EF only 15%. Patient feeling much better and labs reassuring. Will have her get cmp rechecked Monday. Will tx as copd exacerbation. pt has small crack of right mouth commisure and wants nystatin cream bc helped in past but does not appear to be yeast infection on exam.  Understands return precautions. CONSULTS:  None    PROCEDURES:  Unless otherwise noted below, none     Procedures    FINAL IMPRESSION      1. COPD exacerbation (HCC)    2. Dehydration    3. Hypokalemia    4. Hypocalcemia          DISPOSITION/PLAN   DISPOSITION        PATIENT REFERRED TO:  Madhu Bravo MD  [address] [facility] [address] [facility]    Schedule an appointment as soon as possible for a visit on 4/11/2022  for repeat CMP      DISCHARGE MEDICATIONS:  New Prescriptions    DOXYCYCLINE HYCLATE (VIBRA-TABS) 100 MG TABLET    Take 1 tablet by mouth 2 times daily for 7 days    METHYLPREDNISOLONE (MEDROL, JOSÉ MIGUEL,) 4 MG TABLET    Take by mouth. NYSTATIN (MYCOSTATIN) 351718 UNIT/GM CREAM    Apply topically 2 times daily.           (Please note that portions of this note were completed with a voice recognition program.  Efforts were made to edit thedictations but occasionally words are mis-transcribed.)    David Blanco MD (electronically signed)  Attending Emergency Physician        Nellie Plaza MD  04/08/22 5597

## 2022-04-25 ENCOUNTER — OFFICE VISIT (OUTPATIENT)
Age: 62
End: 2022-04-25

## 2022-04-25 ENCOUNTER — OFFICE VISIT (OUTPATIENT)
Dept: CARDIOLOGY CLINIC | Age: 62
End: 2022-04-25

## 2022-04-25 VITALS
HEART RATE: 101 BPM | BODY MASS INDEX: 26.68 KG/M2 | SYSTOLIC BLOOD PRESSURE: 102 MMHG | OXYGEN SATURATION: 98 % | HEIGHT: 66 IN | DIASTOLIC BLOOD PRESSURE: 68 MMHG | WEIGHT: 166 LBS

## 2022-04-25 VITALS
OXYGEN SATURATION: 97 % | BODY MASS INDEX: 27.32 KG/M2 | HEIGHT: 66 IN | HEART RATE: 100 BPM | RESPIRATION RATE: 20 BRPM | TEMPERATURE: 97.9 F | SYSTOLIC BLOOD PRESSURE: 90 MMHG | DIASTOLIC BLOOD PRESSURE: 60 MMHG | WEIGHT: 170 LBS

## 2022-04-25 DIAGNOSIS — I42.8 NONISCHEMIC CARDIOMYOPATHY (HCC): Primary | ICD-10-CM

## 2022-04-25 DIAGNOSIS — I42.8 NONISCHEMIC CARDIOMYOPATHY (HCC): ICD-10-CM

## 2022-04-25 DIAGNOSIS — B37.0 THRUSH: Primary | ICD-10-CM

## 2022-04-25 LAB
ALBUMIN SERPL-MCNC: 4.3 G/DL (ref 3.5–5.2)
ALP BLD-CCNC: 138 U/L (ref 35–104)
ALT SERPL-CCNC: 38 U/L (ref 5–33)
ANION GAP SERPL CALCULATED.3IONS-SCNC: 14 MMOL/L (ref 7–19)
AST SERPL-CCNC: 21 U/L (ref 5–32)
BILIRUB SERPL-MCNC: <0.2 MG/DL (ref 0.2–1.2)
BUN BLDV-MCNC: 28 MG/DL (ref 6–20)
CALCIUM SERPL-MCNC: 9.8 MG/DL (ref 8.6–10)
CHLORIDE BLD-SCNC: 95 MMOL/L (ref 98–111)
CO2: 26 MMOL/L (ref 22–29)
CREAT SERPL-MCNC: 0.8 MG/DL (ref 0.5–0.9)
GFR AFRICAN AMERICAN: >59
GFR NON-AFRICAN AMERICAN: >60
GLUCOSE BLD-MCNC: 107 MG/DL (ref 74–109)
POTASSIUM SERPL-SCNC: 4.8 MMOL/L (ref 3.5–5)
SODIUM BLD-SCNC: 135 MMOL/L (ref 136–145)
TOTAL PROTEIN: 7.6 G/DL (ref 6.6–8.7)

## 2022-04-25 PROCEDURE — 99213 OFFICE O/P EST LOW 20 MIN: CPT | Performed by: NURSE PRACTITIONER

## 2022-04-25 PROCEDURE — 99214 OFFICE O/P EST MOD 30 MIN: CPT | Performed by: NURSE PRACTITIONER

## 2022-04-25 PROCEDURE — 93000 ELECTROCARDIOGRAM COMPLETE: CPT | Performed by: NURSE PRACTITIONER

## 2022-04-25 RX ORDER — FUROSEMIDE 40 MG/1
40 TABLET ORAL DAILY
Qty: 90 TABLET | Refills: 3 | Status: SHIPPED | OUTPATIENT
Start: 2022-04-25 | End: 2022-07-28 | Stop reason: SDUPTHER

## 2022-04-25 RX ORDER — SPIRONOLACTONE 25 MG/1
25 TABLET ORAL DAILY
Qty: 90 TABLET | Refills: 3 | Status: SHIPPED | OUTPATIENT
Start: 2022-04-25 | End: 2022-08-10

## 2022-04-25 ASSESSMENT — ENCOUNTER SYMPTOMS
COLOR CHANGE: 0
EYES NEGATIVE: 1
SHORTNESS OF BREATH: 1
COUGH: 1
CHEST TIGHTNESS: 0
RESPIRATORY NEGATIVE: 1
GASTROINTESTINAL NEGATIVE: 1
SORE THROAT: 1

## 2022-04-25 NOTE — PATIENT INSTRUCTIONS
[place] [facility] [address] located on the first floor of [place] [address] through hospital main entrance and turn immediately to your left. Date/Time:     Patient's contact number:  [phone] (home)     Echocardiogram -  No prep. Takes approximately 30 min. An echocardiogram uses sound waves to produce images of your heart. This commonly used test allows your doctor to see how your heart is beating and pumping blood. Your doctor can use the images from an echocardiogram to identify various abnormalities in the heart muscle and valves. This test has 2 parts:   Ø You will be asked to disrobe from the waist up and given a gown to wear. The technologist will then hook up an EKG monitor to you for the entire exam.   Ø You will then have an ultrasound of your heart (echocardiogram) to assess the heart muscle, heart valves and heart function. You may eat and take any medicines before the exam.     If you need to change your appointment, please call outpatient scheduling at [phone].

## 2022-04-25 NOTE — PROGRESS NOTES
[id number] [facility] Cardiology  [address] [facility] [facility]  [phone]      Chief Complaint / Reason for Being Seen: Hospital follow-up    1. Nonischemic cardiomyopathy [facility])      Patient with a past medical history of COPD. She is a current smoker. Patient had a hospitalization on 3/30/2022 for complaints of chest pain and dyspnea on exertion. Patient was found to have a positive troponin level at [facility] ER and was sent to [facility] for higher level care. She did have an elevated D-dimer here and a negative troponin. She was sent for an echo. The EF was 15% with LV dilatation. CTA was negative for PE. Patient received IV Lasix, ACE inhibitor and Aldactone. Patient underwent cardiac catheterization that showed:  Conclusions      Severely impaired left ventricular function   Normal coronaries. Recommendations      Routine Post Diagnostic Cath orders. Risk factor modification. Consideration for staged ICD at later date      Signatures      ----------------------------------------------------------------   Electronically signed by Maryann Lomeli MD(Performing    Patient was started on Entresto, Coreg, Lasix, and spironolactone. She was sent home on a LifeVest.  Anticipate repeat 2D echo 90 days after initial 2D echo which was on 3/30/2022. Patient presents to clinic today for hospital follow-up. Denies any chest pain, pressure or tightness. She still has shortness of breath with exertion. She is very tired and fatigued. She denies any lower extremity edema. Patient's main complaint is having been on antibiotic and use a nebulizer she feels like she has a thrush infection. Will refer to urgent care since she has no primary care provider for treatment. Subjective: Old records have been obtained from the referring providers. Those records have been reviewed and summarized.       Gill Myrick is a 63 y.o. female with the following history as recorded in activity change and fatigue. Negative for chills, diaphoresis and fever. HENT: Positive for sore throat. Negative for congestion. Respiratory: Positive for cough and shortness of breath. Negative for chest tightness. Cardiovascular: Negative for chest pain and palpitations. Neurological: Negative for dizziness, syncope, light-headedness and headaches. Psychiatric/Behavioral: Negative for confusion. The patient is not nervous/anxious. Objective:    /68   Pulse 101   Ht 5' 6\" (1.676 m)   Wt 166 lb (75.3 kg)   SpO2 98%   BMI 26.79 kg/m²     GENERAL - well developed and well nourished    HEENT -  PERRLA, Hearing appears normal, conjunctive and lids are normal.  External inspection of ears and nose appear normal.  NECK - no thyromegaly, no JVD, trachea is in the midline  CARDIOVASCULAR - PMI is in the mid line clavicular position, Normal S1 and S2 with no systolic murmur. No S3 or S4    PULMONARY - no respiratory distress. No wheezes or rales. Lungs are clear to ausculation, normal respiratory effort. ABDOMEN  - soft, non tender, no rebound  MUSCULOSKELETAL  - range of motion of the upper and lower extermites appears normal and equal and is without pain   EXTREMITIES - no significant edema   NEUROLOGIC - gait and station are normal  SKIN - turgor is normal, skin warm and dry. PSYCHIATRIC - normal mood and affect, alert and orientated x 3,      ASSESSMENT:    ALL THE CARDIOLOGY PROBLEMS ARE LISTED ABOVE; HOWEVER, THE FOLLOWING SPECIFIC CARDIAC PROBLEMS / CONDITIONS WERE ADDRESSED AND TREATED DURING THE OFFICE VISIT TODAY:       Cardiac Specific Problem  Discussion and Plan         1. Nonischemic cardiomyopathy  initial encounter   Blood pressure 102/68. O2 sat 98%. EKG showing a heart rate of 101 with left bundle branch block. This is unchanged from previous EKG. Patient is on Coreg, Lasix, Entresto and Aldactone. Will obtain CMP today. Schedule 2D echo for the end of June.   This will be 90 days post first echo to evaluate EF. Does have scheduled follow-up appointment with Dr. Jeffory Boast to discuss ICD placement. Patient currently wearing LifeVest.  There has been no alerts. 2.   Complaints of oral thrush  initial encounter   Will send to urgent care since she has no primary care provider for further evaluation and treatment. PLAN:    Orders Placed This Encounter   Procedures    Comprehensive Metabolic Panel     Standing Status:   Future     Standing Expiration Date:   4/25/2023    EKG 12 lead     Order Specific Question:   Reason for Exam?     Answer: Other    Echo 2D w doppler w color complete     Standing Status:   Future     Standing Expiration Date:   4/25/2023     Order Specific Question:   Reason for exam:     Answer:   check EF     Orders Placed This Encounter   Medications    furosemide (LASIX) 40 MG tablet     Sig: Take 1 tablet by mouth daily     Dispense:  90 tablet     Refill:  3    spironolactone (ALDACTONE) 25 MG tablet     Sig: Take 1 tablet by mouth daily     Dispense:  90 tablet     Refill:  3    sacubitril-valsartan (ENTRESTO) 24-26 MG per tablet     Sig: Take 1 tablet by mouth 2 times daily     Dispense:  180 tablet     Refill:  0       Return in about 3 months (around 7/25/2022) for Dr Jeffory Boast . Discussed with patient. I greatly appreciate the opportunity to meet Eric Flores and your confidence in allowing me to participate in her cardiovascular care. DIANA Sainz NP 4/25/2022 10:09 AM CDT                    This dictation was generated by voice recognition computer software. Although all attempts are made to edit dictation for accuracy, there may be errors in the transcription that are not intended.

## 2022-04-25 NOTE — PROGRESS NOTES
[address]  [address] [facility] [address] [id number]  Dept: [phone]  Dept Fax: [phone]  Loc: [phone]     Jong Diana is a 63 y.o. female who presents today for her medical conditions/complaintsas noted below. Jong Diana is c/o of Oral Pain        HPI:     HPI    Pt presents with c/o thrush in mouth. States she is on multiple medications and thinks that has caused white blotches on her tongue. Denies fever, cough, congestion, sore throat, or any other symptoms. Past Medical History:   Diagnosis Date    Heart failure ([address]     reduced ejection fx      Past Surgical History:   Procedure Laterality Date    ANKLE SURGERY Left     CARDIAC CATHETERIZATION      CARDIAC CATHETERIZATION       SECTION      SHOULDER SURGERY Right        Family History   Problem Relation Age of Onset    High Blood Pressure Father        Social History     Tobacco Use    Smoking status: Current Every Day Smoker     Packs/day: 0.50     Types: Cigarettes    Smokeless tobacco: Never Used   Substance Use Topics    Alcohol use: Yes     Comment: occ      Current Outpatient Medications   Medication Sig Dispense Refill    furosemide (LASIX) 40 MG tablet Take 1 tablet by mouth daily 90 tablet 3    spironolactone (ALDACTONE) 25 MG tablet Take 1 tablet by mouth daily 90 tablet 3    sacubitril-valsartan (ENTRESTO) 24-26 MG per tablet Take 1 tablet by mouth 2 times daily 180 tablet 0    nystatin (MYCOSTATIN) 737538 UNIT/GM cream Apply topically 2 times daily.  1 each 0    carvedilol (COREG) 6.25 MG tablet Take 1 tablet by mouth 2 times daily (with meals) 60 tablet 3    ipratropium-albuterol (DUONEB) 0.5-2.5 (3) MG/3ML SOLN nebulizer solution Inhale 3 mLs into the lungs every 4 hours as needed for Shortness of Breath 90 mL 0    melatonin 3 MG TABS tablet Take 10 mg by mouth at bedtime      famotidine (PEPCID) 10 MG tablet Take 10 mg by mouth 2 times daily as needed      albuterol sulfate HFA (VENTOLIN HFA) 108 (90 Base) MCG/ACT inhaler Inhale 2 puffs into the lungs every 6 hours as needed for Wheezing       No current facility-administered medications for this visit. Allergies   Allergen Reactions    Morphine Nausea And Vomiting    Nickel Rash    Fish Allergy Nausea And Vomiting     Scallops         Health Maintenance   Topic Date Due    COVID-19 Vaccine (1) Never done    Pneumococcal 0-64 years Vaccine (1 - PCV) Never done    Depression Screen  Never done    Hepatitis C screen  Never done    DTaP/Tdap/Td vaccine (1 - Tdap) Never done    Cervical cancer screen  Never done    Lipids  Never done    Colorectal Cancer Screen  Never done    Breast cancer screen  Never done    Shingles Vaccine (1 of 2) Never done    Flu vaccine (Season Ended) 09/01/2022    Potassium  04/25/2023    Creatinine  04/25/2023    HIV screen  Completed    Hepatitis A vaccine  Aged Out    Hepatitis B vaccine  Aged Out    Hib vaccine  Aged Out    Meningococcal (ACWY) vaccine  Aged Out       Subjective:     Review of Systems   Constitutional: Negative. HENT: Positive for mouth sores. Negative for dental problem. Eyes: Negative. Respiratory: Negative. Cardiovascular: Negative. Gastrointestinal: Negative. Endocrine: Negative. Genitourinary: Negative. Musculoskeletal: Negative. Skin: Negative for color change. Neurological: Negative. Hematological: Negative. Psychiatric/Behavioral: Negative. Objective:     Physical Exam  Constitutional:       Appearance: Normal appearance. She is well-developed. HENT:      Head: Normocephalic. Right Ear: Hearing, tympanic membrane, ear canal and external ear normal.      Left Ear: Hearing, tympanic membrane, ear canal and external ear normal.      Nose: Nose normal.      Right Sinus: No maxillary sinus tenderness or frontal sinus tenderness.       Left Sinus: No maxillary sinus tenderness or frontal sinus tenderness. Mouth/Throat:      Mouth: Mucous membranes are moist.      Pharynx: Oropharynx is clear. Tonsils: 0 on the right. 0 on the left. Eyes:      Conjunctiva/sclera: Conjunctivae normal.      Pupils: Pupils are equal, round, and reactive to light. Neck:      Trachea: Trachea normal.   Cardiovascular:      Rate and Rhythm: Normal rate and regular rhythm. Pulmonary:      Effort: Pulmonary effort is normal.      Breath sounds: Normal breath sounds. Abdominal:      Palpations: Abdomen is soft. Musculoskeletal:      Cervical back: Normal range of motion. Lymphadenopathy:      Head:      Right side of head: No submental, submandibular, tonsillar, preauricular, posterior auricular or occipital adenopathy. Left side of head: No submental, submandibular, tonsillar, preauricular, posterior auricular or occipital adenopathy. Skin:     General: Skin is warm and dry. Capillary Refill: Capillary refill takes less than 2 seconds. Neurological:      Mental Status: She is alert and oriented to person, place, and time. Psychiatric:         Speech: Speech normal.         Behavior: Behavior normal.         Thought Content: Thought content normal.       BP 90/60   Pulse 100   Temp 97.9 °F (36.6 °C)   Resp 20   Ht 5' 6\" (1.676 m)   Wt 170 lb (77.1 kg)   SpO2 97%   BMI 27.44 kg/m²     Assessment:          Diagnosis Orders   1. Thrush         Plan:    No orders of the defined types were placed in this encounter. No follow-ups on file. No orders of the defined types were placed in this encounter. No orders of the defined types were placed in this encounter. Patient given educationalmaterials - see patient instructions. Discussed use, benefit, and side effectsof prescribed medications. All patient questions answered. Pt voiced understanding. Reviewed health maintenance. Instructed to continue current medications, diet andexercise.   Patient agreed with treatment plan. Follow up as directed. Patient Instructions   1. Use the magic mouth wash. 2. Follow up with PCP if symptoms worsen or fail to improve.         Electronically signed by DIANA Moore CNP on 4/25/2022 at 1:37 PM

## 2022-07-26 ENCOUNTER — HOSPITAL ENCOUNTER (OUTPATIENT)
Dept: NON INVASIVE DIAGNOSTICS | Age: 62
Discharge: HOME | End: 2022-07-26

## 2022-07-26 DIAGNOSIS — I42.8 NONISCHEMIC CARDIOMYOPATHY (HCC): ICD-10-CM

## 2022-07-26 LAB
LV EF: 30 %
LVEF MODALITY: NORMAL

## 2022-07-26 PROCEDURE — 6360000004 HC RX CONTRAST MEDICATION: Performed by: NURSE PRACTITIONER

## 2022-07-26 PROCEDURE — C8923 2D TTE W OR W/O FOL W/CON,CO: HCPCS

## 2022-07-26 RX ADMIN — PERFLUTREN 1.5 ML: 6.52 INJECTION, SUSPENSION INTRAVENOUS at 11:05

## 2022-07-28 ENCOUNTER — OFFICE VISIT (OUTPATIENT)
Dept: CARDIOLOGY CLINIC | Age: 62
End: 2022-07-28

## 2022-07-28 VITALS
BODY MASS INDEX: 29.09 KG/M2 | DIASTOLIC BLOOD PRESSURE: 82 MMHG | HEIGHT: 66 IN | SYSTOLIC BLOOD PRESSURE: 108 MMHG | HEART RATE: 84 BPM | WEIGHT: 181 LBS

## 2022-07-28 DIAGNOSIS — I42.8 NONISCHEMIC CARDIOMYOPATHY (HCC): Primary | ICD-10-CM

## 2022-07-28 DIAGNOSIS — I50.42 CHRONIC COMBINED SYSTOLIC AND DIASTOLIC CHF, NYHA CLASS 3 (HCC): ICD-10-CM

## 2022-07-28 DIAGNOSIS — I42.8 NONISCHEMIC CARDIOMYOPATHY (HCC): ICD-10-CM

## 2022-07-28 DIAGNOSIS — I44.7 LEFT BUNDLE BRANCH BLOCK: ICD-10-CM

## 2022-07-28 PROCEDURE — 99214 OFFICE O/P EST MOD 30 MIN: CPT | Performed by: INTERNAL MEDICINE

## 2022-07-28 RX ORDER — FUROSEMIDE 40 MG/1
40 TABLET ORAL DAILY
Qty: 30 TABLET | Refills: 3 | Status: SHIPPED | OUTPATIENT
Start: 2022-07-28 | End: 2022-08-27

## 2022-07-28 RX ORDER — SACUBITRIL AND VALSARTAN 24; 26 MG/1; MG/1
1 TABLET, FILM COATED ORAL 2 TIMES DAILY
COMMUNITY
End: 2022-07-28

## 2022-07-28 RX ORDER — LISINOPRIL 10 MG/1
10 TABLET ORAL 2 TIMES DAILY
Qty: 60 TABLET | Refills: 5 | Status: SHIPPED
Start: 2022-07-28 | End: 2022-10-20 | Stop reason: ALTCHOICE

## 2022-07-28 RX ORDER — LISINOPRIL 10 MG/1
10 TABLET ORAL 2 TIMES DAILY
Qty: 60 TABLET | Refills: 5 | Status: CANCELLED | OUTPATIENT
Start: 2022-07-28

## 2022-07-28 ASSESSMENT — ENCOUNTER SYMPTOMS
DIARRHEA: 0
SHORTNESS OF BREATH: 0
RESPIRATORY NEGATIVE: 1
VOMITING: 0
EYES NEGATIVE: 1
GASTROINTESTINAL NEGATIVE: 1
NAUSEA: 0

## 2022-07-28 NOTE — PATIENT INSTRUCTIONS
Make sure you wait 48 hours after your last Entresto pill before starting Lisinopril 10 mg (1) tablet twice a day. Take your coreg and lisinopril a hour apart from each other. [place] at the [facility] and [facility] located on the first floor of [place] [address] through hospital main entrance and turn immediately to the left. DATE- 8/10/22 arrive at 730A for 930A procedure. Implantable Cardioverter-Defibrillator [overnight stay]    An implantable cardioverter-defibrillator, or ICD, is a small electronic device designed to treat dangerous tachycardias. The ICD monitors the heart rhythm at all times. If it senses a dangerous tachycardia, the ICD delivers one or more impulses or shocks to the heart and restores a normal rhythm. Prep Instructions:    1. Do not eat or drink anything after midnight the night before your procedure. May take morning medications with a sip of water unless otherwise directed not to.  2.  You should arrange to have someone take you home rather than drive yourself. 3.  Further plans will be made following your procedure. 4.  Hold Glucophage (Metformin) and Coumadin (Warfarin) 48 hours prior to procedure. If for any reason you are unable to keep this appointment, please contact [facility], [phone], as soon as possible to reschedule.

## 2022-07-28 NOTE — PROGRESS NOTES
[facility] Progress Note                            Date:  7/28/2022  Patient: Mark Medel  Age:  63 y.o., 1960      Reason for evaluation:         SUBJECTIVE:    Returns today follow-up assessment dilated cardiomyopathy chronic congestive heart failure. Previous cardiac catheterization 4/1/2022 severely dilated left ventricle ejection fraction 15% unremarkable coronary arteries. She continues taking Coreg and Entresto and Aldactone and Lasix still tired and fatigue some shortness of breath. Her insurance coverage does not include Entresto so she will need to change her over to lisinopril starting in a day or 2. No other complaints or issues reported. Repeat echocardiogram 7/26/2022 ejection fraction 99% grade 3 diastolic dysfunction. EKG shows sinus rhythm left bundle branch block. Review of Systems   Constitutional: Negative. Negative for chills, fever and unexpected weight change. HENT: Negative. Eyes: Negative. Respiratory: Negative. Negative for shortness of breath. Cardiovascular: Negative. Negative for chest pain. Gastrointestinal: Negative. Negative for diarrhea, nausea and vomiting. Endocrine: Negative. Genitourinary: Negative. Musculoskeletal: Negative. Skin: Negative. Neurological: Negative. All other systems reviewed and are negative. OBJECTIVE:     /82   Pulse 84   Ht 5' 6\" (1.676 m)   Wt 181 lb (82.1 kg)   BMI 29.21 kg/m²     Labs:   CBC: No results for input(s): WBC, HGB, HCT, PLT in the last 72 hours. BMP:No results for input(s): NA, K, CO2, BUN, CREATININE, LABGLOM, GLUCOSE in the last 72 hours. BNP: No results for input(s): BNP in the last 72 hours. PT/INR: No results for input(s): PROTIME, INR in the last 72 hours. APTT:No results for input(s): APTT in the last 72 hours. CARDIAC ENZYMES:No results for input(s): CKTOTAL, CKMB, CKMBINDEX, TROPONINI in the last 72 hours.   FASTING LIPID PANEL:No results found for: HDL, LDLDIRECT, LDLCALC, TRIG  LIVER PROFILE:No results for input(s): AST, ALT, LABALBU in the last 72 hours. Past Medical History:   Diagnosis Date    Heart failure ([address]     reduced ejection fx     Past Surgical History:   Procedure Laterality Date    ANKLE SURGERY Left     CARDIAC CATHETERIZATION      CARDIAC CATHETERIZATION       SECTION      SHOULDER SURGERY Right      Family History   Problem Relation Age of Onset    High Blood Pressure Father      Allergies   Allergen Reactions    Morphine Nausea And Vomiting    Nickel Rash    Fish Allergy Nausea And Vomiting     Scallops       Current Outpatient Medications   Medication Sig Dispense Refill    sacubitril-valsartan (ENTRESTO) 24-26 MG per tablet Take 1 tablet by mouth 2 times daily      furosemide (LASIX) 40 MG tablet Take 1 tablet by mouth daily 90 tablet 3    spironolactone (ALDACTONE) 25 MG tablet Take 1 tablet by mouth daily 90 tablet 3    nystatin (MYCOSTATIN) 133676 UNIT/GM cream Apply topically 2 times daily. 1 each 0    carvedilol (COREG) 6.25 MG tablet Take 1 tablet by mouth 2 times daily (with meals) 60 tablet 3    ipratropium-albuterol (DUONEB) 0.5-2.5 (3) MG/3ML SOLN nebulizer solution Inhale 3 mLs into the lungs every 4 hours as needed for Shortness of Breath 90 mL 0    melatonin 3 MG TABS tablet Take 10 mg by mouth at bedtime      famotidine (PEPCID) 10 MG tablet Take 10 mg by mouth 2 times daily as needed      albuterol sulfate HFA (PROVENTIL;VENTOLIN;PROAIR) 108 (90 Base) MCG/ACT inhaler Inhale 2 puffs into the lungs every 6 hours as needed for Wheezing       No current facility-administered medications for this visit.      Social History     Socioeconomic History    Marital status:      Spouse name: Not on file    Number of children: Not on file    Years of education: Not on file    Highest education level: Not on file   Occupational History    Not on file   Tobacco Use    Smoking status: Every Day     Packs/day: 0.50 Types: Cigarettes    Smokeless tobacco: Never   Vaping Use    Vaping Use: Never used   Substance and Sexual Activity    Alcohol use: Yes     Comment: occ    Drug use: Never    Sexual activity: Not on file   Other Topics Concern    Not on file   Social History Narrative    Not on file     Social Determinants of Health     Financial Resource Strain: Not on file   Food Insecurity: Not on file   Transportation Needs: Not on file   Physical Activity: Not on file   Stress: Not on file   Social Connections: Not on file   Intimate Partner Violence: Not on file   Housing Stability: Not on file       Physical Examination:  /82   Pulse 84   Ht 5' 6\" (1.676 m)   Wt 181 lb (82.1 kg)   BMI 29.21 kg/m²   Physical Exam  Vitals reviewed. Constitutional:       Appearance: She is well-developed. Neck:      Vascular: No carotid bruit or JVD. Cardiovascular:      Rate and Rhythm: Normal rate and regular rhythm. Heart sounds: Normal heart sounds. No murmur heard. No friction rub. No gallop. Pulmonary:      Effort: Pulmonary effort is normal. No respiratory distress. Breath sounds: Normal breath sounds. No wheezing or rales. Abdominal:      General: There is no distension. Tenderness: There is no abdominal tenderness. Lymphadenopathy:      Cervical: No cervical adenopathy. Skin:     General: Skin is warm and dry. ASSESSMENT:     Diagnosis Orders   1. Nonischemic cardiomyopathy ([address]        2. Left bundle branch block        3. Chronic combined systolic and diastolic CHF, NYHA class 3 (HCC)            PLAN:  No orders of the defined types were placed in this encounter. No orders of the defined types were placed in this encounter. Continue present medications discontinue Entresto when she is out of her current bottle  Lisinopril 10 mg p.o. twice daily  Recommend biventricular ICD implantation advised indication alternatives benefits and risk patient agreeable.   Arrange as soon as feasible. I have discussed with the patient regarding indications for the proposed procedure ICD/ Pacemaker implantation along with possible alternatives benefits and risks including but not limited to risks of death, myocardial infarction, stroke, contrast induced nephropathy which in some cases may lead to acute kidney failure requiring dialysis, allergic reactions, infections which may require treatment with antibiotics or removal of the device, bleeding requiring blood transfusion,  cardiac arrhthymias, respiratory failure which may require placing the patient on respiratory support such as a ventilator or breathing machine,risk of complications which may require vascular surgery,risks of collapsing the lung with development of a pneumothorax which may require placement of a chest tube, and risks of lead dislodgement which may require follow up surgery and repositioning of the leads. In addition there are long term risks including infection, and device or component failure which may require removal and/or replacement of various components. Risk of wound nonhealing t subsequent erosion etc. also discussed. We also discussed the risk of potential stroke or thromboembolic phenomena during the timeframe that the patient may be off off of anticoagulation. The patient is advised the device battery will eventually become depleted and require replacement at some point. The patient is awake and alert and understands the issues involved and indicates willingness to proceed as ordered. The patient is  a reasonable candidate for moderate conscious sedation. ASA score:  ASA 3 - Patient with moderate systemic disease with functional limitations    Mallampati: I (soft palate, uvula, fauces, tonsillar pillars visible)    Preferred vascular access site will be: left subclavian vein. Return in about 3 months (around 10/28/2022) for return to Dr. Salo Fairbanks only.       Seth Kilgore MD 7/28/2022 10:16 AM CDT    [facility]      Thisdictation was generated by voice recognition computer software. Although all attempts are made to edit the dictation for accuracy, there may be errors in the transcription that are not intended.

## 2022-07-29 RX ORDER — CARVEDILOL 6.25 MG/1
6.25 TABLET ORAL 2 TIMES DAILY WITH MEALS
Qty: 60 TABLET | Refills: 3 | Status: SHIPPED | OUTPATIENT
Start: 2022-07-29 | End: 2022-09-22 | Stop reason: SDUPTHER

## 2022-08-09 ENCOUNTER — PRE-PROCEDURE TELEPHONE (OUTPATIENT)
Dept: CARDIOLOGY | Age: 62
End: 2022-08-09

## 2022-08-09 NOTE — PRE-PROCEDURE INSTRUCTIONS
Called and asked pt to arrive for Bi-V on 8/10/2022 at 8:30 am per anesthesia's request. Pt was agreeable and verbally understood.

## 2022-08-10 ENCOUNTER — HOSPITAL ENCOUNTER (INPATIENT)
Dept: CARDIAC CATH/INVASIVE PROCEDURES | Age: 62
LOS: 1 days | Discharge: HOME | DRG: 226 | End: 2022-08-11
Attending: INTERNAL MEDICINE | Admitting: INTERNAL MEDICINE

## 2022-08-10 ENCOUNTER — APPOINTMENT (OUTPATIENT)
Dept: GENERAL RADIOLOGY | Age: 62
DRG: 226 | End: 2022-08-10
Attending: INTERNAL MEDICINE

## 2022-08-10 DIAGNOSIS — Z95.0 PACEMAKER: Primary | ICD-10-CM

## 2022-08-10 PROBLEM — R93.1 DECREASED CARDIAC EJECTION FRACTION: Status: ACTIVE | Noted: 2022-08-10

## 2022-08-10 LAB
ALBUMIN SERPL-MCNC: 4.4 G/DL (ref 3.5–5.2)
ALP BLD-CCNC: 116 U/L (ref 35–104)
ALT SERPL-CCNC: 23 U/L (ref 5–33)
ANION GAP SERPL CALCULATED.3IONS-SCNC: 12 MMOL/L (ref 7–19)
AST SERPL-CCNC: 17 U/L (ref 5–32)
BILIRUB SERPL-MCNC: 0.3 MG/DL (ref 0.2–1.2)
BUN BLDV-MCNC: 31 MG/DL (ref 8–23)
CALCIUM SERPL-MCNC: 9.7 MG/DL (ref 8.8–10.2)
CHLORIDE BLD-SCNC: 99 MMOL/L (ref 98–111)
CO2: 24 MMOL/L (ref 22–29)
CREAT SERPL-MCNC: 0.9 MG/DL (ref 0.5–0.9)
GFR AFRICAN AMERICAN: >59
GFR NON-AFRICAN AMERICAN: >60
GLUCOSE BLD-MCNC: 114 MG/DL (ref 74–109)
HCT VFR BLD CALC: 41.3 % (ref 37–47)
HEMOGLOBIN: 13.8 G/DL (ref 12–16)
MCH RBC QN AUTO: 34.5 PG (ref 27–31)
MCHC RBC AUTO-ENTMCNC: 33.4 G/DL (ref 33–37)
MCV RBC AUTO: 103.3 FL (ref 81–99)
PDW BLD-RTO: 13.3 % (ref 11.5–14.5)
PLATELET # BLD: 270 K/UL (ref 130–400)
PMV BLD AUTO: 9.2 FL (ref 9.4–12.3)
POTASSIUM REFLEX MAGNESIUM: 5 MMOL/L (ref 3.5–5)
PRO-BNP: 907 PG/ML (ref 0–900)
RBC # BLD: 4 M/UL (ref 4.2–5.4)
SODIUM BLD-SCNC: 135 MMOL/L (ref 136–145)
TOTAL PROTEIN: 7.5 G/DL (ref 6.6–8.7)
WBC # BLD: 11.2 K/UL (ref 4.8–10.8)

## 2022-08-10 PROCEDURE — 6360000002 HC RX W HCPCS

## 2022-08-10 PROCEDURE — 33249 INSJ/RPLCMT DEFIB W/LEAD(S): CPT

## 2022-08-10 PROCEDURE — 33249 INSJ/RPLCMT DEFIB W/LEAD(S): CPT | Performed by: INTERNAL MEDICINE

## 2022-08-10 PROCEDURE — C1889 IMPLANT/INSERT DEVICE, NOC: HCPCS

## 2022-08-10 PROCEDURE — 71046 X-RAY EXAM CHEST 2 VIEWS: CPT | Performed by: RADIOLOGY

## 2022-08-10 PROCEDURE — 2580000003 HC RX 258: Performed by: INTERNAL MEDICINE

## 2022-08-10 PROCEDURE — 33225 L VENTRIC PACING LEAD ADD-ON: CPT

## 2022-08-10 PROCEDURE — C1769 GUIDE WIRE: HCPCS

## 2022-08-10 PROCEDURE — 93971 EXTREMITY STUDY: CPT

## 2022-08-10 PROCEDURE — 02H43KZ INSERTION OF DEFIBRILLATOR LEAD INTO CORONARY VEIN, PERCUTANEOUS APPROACH: ICD-10-PCS | Performed by: INTERNAL MEDICINE

## 2022-08-10 PROCEDURE — 2140000000 HC CCU INTERMEDIATE R&B

## 2022-08-10 PROCEDURE — C1894 INTRO/SHEATH, NON-LASER: HCPCS

## 2022-08-10 PROCEDURE — 80053 COMPREHEN METABOLIC PANEL: CPT

## 2022-08-10 PROCEDURE — C1900 LEAD, CORONARY VENOUS: HCPCS

## 2022-08-10 PROCEDURE — 2580000003 HC RX 258

## 2022-08-10 PROCEDURE — 02HK3KZ INSERTION OF DEFIBRILLATOR LEAD INTO RIGHT VENTRICLE, PERCUTANEOUS APPROACH: ICD-10-PCS | Performed by: INTERNAL MEDICINE

## 2022-08-10 PROCEDURE — 71045 X-RAY EXAM CHEST 1 VIEW: CPT | Performed by: RADIOLOGY

## 2022-08-10 PROCEDURE — 02H63KZ INSERTION OF DEFIBRILLATOR LEAD INTO RIGHT ATRIUM, PERCUTANEOUS APPROACH: ICD-10-PCS | Performed by: INTERNAL MEDICINE

## 2022-08-10 PROCEDURE — 99153 MOD SED SAME PHYS/QHP EA: CPT

## 2022-08-10 PROCEDURE — 99024 POSTOP FOLLOW-UP VISIT: CPT | Performed by: INTERNAL MEDICINE

## 2022-08-10 PROCEDURE — C1887 CATHETER, GUIDING: HCPCS

## 2022-08-10 PROCEDURE — C1898 LEAD, PMKR, OTHER THAN TRANS: HCPCS

## 2022-08-10 PROCEDURE — 99152 MOD SED SAME PHYS/QHP 5/>YRS: CPT

## 2022-08-10 PROCEDURE — 6360000002 HC RX W HCPCS: Performed by: INTERNAL MEDICINE

## 2022-08-10 PROCEDURE — 0JH609Z INSERTION OF CARDIAC RESYNCHRONIZATION DEFIBRILLATOR PULSE GENERATOR INTO CHEST SUBCUTANEOUS TISSUE AND FASCIA, OPEN APPROACH: ICD-10-PCS | Performed by: INTERNAL MEDICINE

## 2022-08-10 PROCEDURE — 71045 X-RAY EXAM CHEST 1 VIEW: CPT

## 2022-08-10 PROCEDURE — 2709999900 HC NON-CHARGEABLE SUPPLY

## 2022-08-10 PROCEDURE — 36415 COLL VENOUS BLD VENIPUNCTURE: CPT

## 2022-08-10 PROCEDURE — 6370000000 HC RX 637 (ALT 250 FOR IP): Performed by: INTERNAL MEDICINE

## 2022-08-10 PROCEDURE — 85027 COMPLETE CBC AUTOMATED: CPT

## 2022-08-10 PROCEDURE — 71046 X-RAY EXAM CHEST 2 VIEWS: CPT

## 2022-08-10 PROCEDURE — 99152 MOD SED SAME PHYS/QHP 5/>YRS: CPT | Performed by: INTERNAL MEDICINE

## 2022-08-10 PROCEDURE — C1895 LEAD, AICD, ENDO DUAL COIL: HCPCS

## 2022-08-10 PROCEDURE — 6360000004 HC RX CONTRAST MEDICATION: Performed by: INTERNAL MEDICINE

## 2022-08-10 PROCEDURE — 93005 ELECTROCARDIOGRAM TRACING: CPT | Performed by: INTERNAL MEDICINE

## 2022-08-10 PROCEDURE — 83880 ASSAY OF NATRIURETIC PEPTIDE: CPT

## 2022-08-10 PROCEDURE — C1882 AICD, OTHER THAN SING/DUAL: HCPCS

## 2022-08-10 PROCEDURE — 33225 L VENTRIC PACING LEAD ADD-ON: CPT | Performed by: INTERNAL MEDICINE

## 2022-08-10 RX ORDER — FAMOTIDINE 20 MG/1
10 TABLET, FILM COATED ORAL 2 TIMES DAILY PRN
Status: DISCONTINUED | OUTPATIENT
Start: 2022-08-10 | End: 2022-08-11 | Stop reason: HOSPADM

## 2022-08-10 RX ORDER — ONDANSETRON 2 MG/ML
4 INJECTION INTRAMUSCULAR; INTRAVENOUS EVERY 6 HOURS PRN
Status: DISCONTINUED | OUTPATIENT
Start: 2022-08-10 | End: 2022-08-11 | Stop reason: HOSPADM

## 2022-08-10 RX ORDER — FUROSEMIDE 40 MG/1
40 TABLET ORAL DAILY
Status: DISCONTINUED | OUTPATIENT
Start: 2022-08-11 | End: 2022-08-11 | Stop reason: HOSPADM

## 2022-08-10 RX ORDER — SPIRONOLACTONE 25 MG/1
25 TABLET ORAL DAILY
Status: DISCONTINUED | OUTPATIENT
Start: 2022-08-11 | End: 2022-08-11 | Stop reason: HOSPADM

## 2022-08-10 RX ORDER — SODIUM CHLORIDE 9 MG/ML
INJECTION, SOLUTION INTRAVENOUS CONTINUOUS
Status: DISCONTINUED | OUTPATIENT
Start: 2022-08-10 | End: 2022-08-11 | Stop reason: HOSPADM

## 2022-08-10 RX ORDER — CARVEDILOL 6.25 MG/1
6.25 TABLET ORAL 2 TIMES DAILY WITH MEALS
Status: DISCONTINUED | OUTPATIENT
Start: 2022-08-10 | End: 2022-08-11 | Stop reason: HOSPADM

## 2022-08-10 RX ORDER — IPRATROPIUM BROMIDE AND ALBUTEROL SULFATE 2.5; .5 MG/3ML; MG/3ML
3 SOLUTION RESPIRATORY (INHALATION) EVERY 4 HOURS PRN
Status: DISCONTINUED | OUTPATIENT
Start: 2022-08-10 | End: 2022-08-11 | Stop reason: HOSPADM

## 2022-08-10 RX ORDER — ALBUTEROL SULFATE 2.5 MG/3ML
2.5 SOLUTION RESPIRATORY (INHALATION) EVERY 6 HOURS PRN
Status: DISCONTINUED | OUTPATIENT
Start: 2022-08-10 | End: 2022-08-11 | Stop reason: HOSPADM

## 2022-08-10 RX ORDER — LISINOPRIL 10 MG/1
10 TABLET ORAL 2 TIMES DAILY
Status: DISCONTINUED | OUTPATIENT
Start: 2022-08-10 | End: 2022-08-11 | Stop reason: HOSPADM

## 2022-08-10 RX ORDER — MECOBALAMIN 5000 MCG
10 TABLET,DISINTEGRATING ORAL NIGHTLY
Status: DISCONTINUED | OUTPATIENT
Start: 2022-08-10 | End: 2022-08-11 | Stop reason: HOSPADM

## 2022-08-10 RX ORDER — ACETAMINOPHEN 325 MG/1
650 TABLET ORAL EVERY 6 HOURS PRN
Status: DISCONTINUED | OUTPATIENT
Start: 2022-08-10 | End: 2022-08-11 | Stop reason: HOSPADM

## 2022-08-10 RX ORDER — SODIUM CHLORIDE 9 MG/ML
INJECTION, SOLUTION INTRAVENOUS CONTINUOUS
Status: ACTIVE | OUTPATIENT
Start: 2022-08-10 | End: 2022-08-10

## 2022-08-10 RX ORDER — ALBUTEROL SULFATE 90 UG/1
2 AEROSOL, METERED RESPIRATORY (INHALATION) EVERY 6 HOURS PRN
Status: DISCONTINUED | OUTPATIENT
Start: 2022-08-10 | End: 2022-08-10 | Stop reason: CLARIF

## 2022-08-10 RX ADMIN — ACETAMINOPHEN 650 MG: 325 TABLET, FILM COATED ORAL at 21:59

## 2022-08-10 RX ADMIN — SODIUM CHLORIDE: 9 INJECTION, SOLUTION INTRAVENOUS at 13:37

## 2022-08-10 RX ADMIN — SODIUM CHLORIDE: 9 INJECTION, SOLUTION INTRAVENOUS at 09:17

## 2022-08-10 RX ADMIN — MUPIROCIN: 20 OINTMENT TOPICAL at 09:17

## 2022-08-10 RX ADMIN — LISINOPRIL 10 MG: 10 TABLET ORAL at 21:59

## 2022-08-10 RX ADMIN — IOPAMIDOL 35 ML: 612 INJECTION, SOLUTION INTRAVENOUS at 13:08

## 2022-08-10 RX ADMIN — Medication 10 MG: at 21:59

## 2022-08-10 RX ADMIN — WATER 1000 MG: 1 INJECTION INTRAMUSCULAR; INTRAVENOUS; SUBCUTANEOUS at 17:44

## 2022-08-10 RX ADMIN — CARVEDILOL 6.25 MG: 6.25 TABLET, FILM COATED ORAL at 17:44

## 2022-08-10 RX ADMIN — CHLORHEXIDINE GLUCONATE: 4 LIQUID TOPICAL at 09:16

## 2022-08-10 NOTE — PLAN OF CARE
Problem: Discharge Planning  Goal: Discharge to home or other facility with appropriate resources  Outcome: Progressing  Flowsheets  Taken 8/10/2022 1523  Discharge to home or other facility with appropriate resources: Identify barriers to discharge with patient and caregiver  Taken 8/10/2022 1519  Discharge to home or other facility with appropriate resources:   Identify barriers to discharge with patient and caregiver   Arrange for needed discharge resources and transportation as appropriate     Problem: Chronic Conditions and Co-morbidities  Goal: Patient's chronic conditions and co-morbidity symptoms are monitored and maintained or improved  Outcome: Progressing  Flowsheets (Taken 8/10/2022 1523)  Care Plan - Patient's Chronic Conditions and Co-Morbidity Symptoms are Monitored and Maintained or Improved: Monitor and assess patient's chronic conditions and comorbid symptoms for stability, deterioration, or improvement     Problem: Safety - Adult  Goal: Free from fall injury  Outcome: Progressing

## 2022-08-10 NOTE — PROGRESS NOTES
Dr. Landis Must here to speak to patient about anesthesia not being available at this time. Plan is to do procedure under conscious sedation. Patient agreeable.   Electronically signed by Constantino Ashton RN on 8/10/2022 at 10:22 AM

## 2022-08-10 NOTE — PROGRESS NOTES
Patient's pacemaker site is clean, dry, and intact. No drainage, swelling, bruising, or hematoma noted.

## 2022-08-10 NOTE — PROCEDURES
Cardiac Biventricular  Placement Operative Report    Marisa Veloz  [id number]  8/10/2022    Surgeon: Marisa Tellez    Anesthesia: Intravenous sedation and local anesthetic    Procedure(s):1. Implantation of a biventricular ICD     Indications:   1. Cardiomyopathy with ejection fraction 15%  2. Chronic congestive systolic and diastolic heart failure [place] heart association class III  3. Left bundle branch block    Procedure Details  The risks, benefits, complications, treatment options, and expected outcomes were discussed with the patient. The patient and/or family concurred with the proposed plan, giving informed consent. Patient was prepped and draped in the usual strict sterile fashion. After the antibiotic was completely infused, 30 cc Sensorcaine was infiltrated into the left Infraclavicular area. Venous access obtained utilizing a micropuncture needle under ultrasonographic guidance and the micropuncture wire was advanced followed by the dilator. The wire was then exchanged for a standard 0.0.35 wire and then the dilator was removed. Next, an incision was made adjacent to the wire entry site. Utilizing sharp and blunt dissection a pocket was then created down to the prepectoralis fascia. The guidewire was identified, freed from the surrounding subcutaneous tissue, and delivered into the operative field. Next an 9french sheath and dilator was advanced over the wire then the dilator was removed. Another similar wire was advanced into the sheath then the sheath was removed leaving two wires in place in the subclavian vein. Next, the 7french sheath and dilators were advanced over their respective wires into the subclavian vein. The dilator and wires were then removed. Next the atrial and ventricular leads were inserted into their respective sheaths   And advanced into the right atrium and ventricle where the leads were positioned under fluoroscopic guidance. The sheaths were peeled away and removed. Appropriate sensing and thresholds were obtained. No diaphragmatic pacing occurred at 10 V and 1.5 ms. The active fixation mechanisms were extended. Next, the leads were then sutured utilizing 2-0 ethibond sutures. Lead measurements were then rechecked. Next, the subclavian vein was cannulated in a separate location slightly lateral to the original point of entry and a 0.035 wire was advanced into the subclavian vein and subsequently into the right side of the heart. Next, the 9french coronary sinus sheath and dilator was advanced over the wire under fluoroscopic guidance into the right ventricle. The dilator was removed. The wire was withdrawn. The sheath was then withdrawn slowly with counterclockwise torque applied and was brought into the right atrium and directed into the ostium of the coronary sinus. The wire was then advanced into the coronary sinus and position confirmed in CARSON and Pashto views. The sheath was then gently advanced into the coronary sinus. Next the wire was removed and the coronary sinus flotation balloon was advanced into the coronary sinus just distal to the tip of the sheath. The balloon was then gently inflated and a small amount of contrast was injected in CARSON, Pashto, and AP views to obtain a coronary sinus venogram. Next an appropriate branch of the coronary sinus was selected for lead placement. The flotation balloon was withdrawn and removed. Next a 0.014 coronary guidewire BMW and advanced into the respective branch. The coronary sinus lead was then advanced over the wire into the respective branch and position confirmed in several views. Sensing and pacing parameters were then assessed and found to be suitable. The lead was then paced at 10 volts with no evidence of diaphragmatic stimulation. After having assured adequate lead position the guidewire was removed and the sheath was incised using the appropriate splitter device provided by the .  The lead was then secured in position utilizing 2-0 ethibond sutures. After having assured adequate hemostasis the leads were connected to the pulse generator and the pulse generator and leads were placed in the pocket. The pocket was copiously irrigated utilizing antibiotic solution. The pacemaker and leads system were visualized under fluoroscopy. Appropriate redundancy/slack in the leads were noted. The pins of the leads were beyond the set screws. Hemostasis was reverified. The pocket was then closed using 2-0 Vicryl for the deep layer and 3-0 Vicryl for the middle layer. Dermabond was then applied to the skin and sterile dressing was applied. At the end of the procedure instrument, needle, and sponge counts were correct. An arm immobilizer was applied at this point and the patient was transferred. An independent trained observer administered medications under my direction. The patient was continuously monitored with respect to level of consciousness, and vital signs/physiologic status throughout the case. For further details regarding specific medications and doses please refer to Cath Lab procedural notes.       Anesthesia start time:1106  Anesthesia stop time:1312          ICD Data:       Atrial lead   Medtronic  Model   7184-22   serial #   [id number]  Volt    1.4 V    PW    0.4 ms    Current   NA   ma       Impedance:   616   ohms        Slew rate:   NA   V/sec  P wave:   1.6 mv    Right Ventricular lead    Medtronic  Model   8548I81   serial #   [id number]  Volt    1.0    V     PW    0.4 ms     Current   NA   ma    I  Impedance:   689   ohms       Slew rate:   NA   V/sec  R wave:    4 mv    Coronary sinus lead    Medtronic  Model   [id number]   serial #   [id number]  Volt    1.25   V    PW    1.0   ms    Current   NA   ma    I  Impedance:   1083   ohms          Slew rate:   NA   V/sec  R wave: N/A ms    Generator  Medtronic  Model   [id number]  Serial   [id number]      Induction: Not performed      Estimated Blood Loss:  Minimal         Complications:  None; patient tolerated the procedure well. Disposition:  To progressive care unit           Condition: stable      Miguel Ángel Obrien MD 8/10/2022 1:17 PM

## 2022-08-10 NOTE — PROGRESS NOTES
Patti Ana arrived to room # 717. Presented with: post BiV placement  Mental Status: Patient is oriented, alert, coherent, logical, thought processes intact, and able to concentrate and follow conversation. Vitals:    08/10/22 1523   BP:    Pulse: 99   Resp:    Temp:    SpO2:      Patient safety contract and falls prevention contract reviewed with patient Yes. Oriented Patient to room. Call light within reach. Yes.   Needs, issues or concerns expressed at this time: no.      Electronically signed by Stephy Apodaca RN on 8/10/2022 at 3:42 PM

## 2022-08-10 NOTE — PROGRESS NOTES
Patient transferred to PCU room 717 by transportation. Telemetry monitor on patient prior to transfer.   Electronically signed by Jaylen Montelongo RN on 8/10/2022 at 3:09 PM

## 2022-08-10 NOTE — PROGRESS NOTES
4 Eyes Skin Assessment    Evan Chang is being assessed upon: Admission    I agree that I, Alison Dueñas RN, along with Rene Morales RN (either 2 RN's or 1 LPN and 1 RN) have performed a thorough Head to Toe Skin Assessment on the patient. ALL assessment sites listed below have been assessed. Areas assessed by both nurses:     [x]   Head, Face, and Ears   [x]   Shoulders, Back, and Chest  [x]   Arms, Elbows, and Hands   [x]   Coccyx, Sacrum, and Ischium  [x]   Legs, Feet, and Heels    Does the Patient have Skin Breakdown?  No    Tanvir Prevention initiated: No  Wound Care Orders initiated: No    WOC nurse consulted for Pressure Injury (Stage 3,4, Unstageable, DTI, NWPT, and Complex wounds) and New or Established Ostomies: No        Primary Nurse eSignature: Alison Dueñas RN on 8/10/2022 at 3:43 PM      Co-Signer eSignature: Electronically signed by Alison Dueñas RN on 8/10/22 at 3:43 PM CDT

## 2022-08-10 NOTE — H&P
Office Visit    7/28/2022  [facility] MD    Interventional Cardiology Nonischemic cardiomyopathy [facility]) +2 more    Dx Follow-up    Reason for Visit         Progress Notes  Daxa Christina MD (Physician)   Interventional Cardiology  Expand All Collapse All  [facility] Progress Note                              Date:                7/28/2022  Patient:            Corina Medrano  Age:                 63 y.o., 1960        Reason for evaluation:            SUBJECTIVE:    Returns today follow-up assessment dilated cardiomyopathy chronic congestive heart failure. Previous cardiac catheterization 4/1/2022 severely dilated left ventricle ejection fraction 15% unremarkable coronary arteries. She continues taking Coreg and Entresto and Aldactone and Lasix still tired and fatigue some shortness of breath. Her insurance coverage does not include Entresto so she will need to change her over to lisinopril starting in a day or 2. No other complaints or issues reported. Repeat echocardiogram 7/26/2022 ejection fraction 22% grade 3 diastolic dysfunction. EKG shows sinus rhythm left bundle branch block. Review of Systems  Constitutional: Negative. Negative for chills, fever and unexpected weight change. HENT: Negative. Eyes: Negative. Respiratory: Negative. Negative for shortness of breath. Cardiovascular: Negative. Negative for chest pain. Gastrointestinal: Negative. Negative for diarrhea, nausea and vomiting. Endocrine: Negative. Genitourinary: Negative. Musculoskeletal: Negative. Skin: Negative. Neurological: Negative. All other systems reviewed and are negative. OBJECTIVE:     /82   Pulse 84   Ht 5' 6\" (1.676 m)   Wt 181 lb (82.1 kg)   BMI 29.21 kg/m²      Labs:  CBC: No results for input(s): WBC, HGB, HCT, PLT in the last 72 hours.   BMP:No results for input(s): NA, K, CO2, BUN, CREATININE, LABGLOM, GLUCOSE in the last 72 hours. BNP: No results for input(s): BNP in the last 72 hours. PT/INR: No results for input(s): PROTIME, INR in the last 72 hours. APTT:No results for input(s): APTT in the last 72 hours. CARDIAC ENZYMES:No results for input(s): CKTOTAL, CKMB, CKMBINDEX, TROPONINI in the last 72 hours. FASTING LIPID PANEL:No results found for: HDL, LDLDIRECT, LDLCALC, TRIG  LIVER PROFILE:No results for input(s): AST, ALT, LABALBU in the last 72 hours. Past Medical History        Past Medical History:   Diagnosis Date    Heart failure ([address]       reduced ejection fx         Past Surgical History         Past Surgical History:   Procedure Laterality Date    ANKLE SURGERY Left      CARDIAC CATHETERIZATION        CARDIAC CATHETERIZATION         SECTION        SHOULDER SURGERY Right           Family History         Family History   Problem Relation Age of Onset    High Blood Pressure Father                 Allergies   Allergen Reactions    Morphine Nausea And Vomiting    Nickel Rash    Fish Allergy Nausea And Vomiting       Scallops         Current Facility-Administered Medications          Current Outpatient Medications   Medication Sig Dispense Refill    sacubitril-valsartan (ENTRESTO) 24-26 MG per tablet Take 1 tablet by mouth 2 times daily        furosemide (LASIX) 40 MG tablet Take 1 tablet by mouth daily 90 tablet 3    spironolactone (ALDACTONE) 25 MG tablet Take 1 tablet by mouth daily 90 tablet 3    nystatin (MYCOSTATIN) 271432 UNIT/GM cream Apply topically 2 times daily.  1 each 0    carvedilol (COREG) 6.25 MG tablet Take 1 tablet by mouth 2 times daily (with meals) 60 tablet 3    ipratropium-albuterol (DUONEB) 0.5-2.5 (3) MG/3ML SOLN nebulizer solution Inhale 3 mLs into the lungs every 4 hours as needed for Shortness of Breath 90 mL 0    melatonin 3 MG TABS tablet Take 10 mg by mouth at bedtime        famotidine (PEPCID) 10 MG tablet Take 10 mg by mouth 2 times daily as needed albuterol sulfate HFA (PROVENTIL;VENTOLIN;PROAIR) 108 (90 Base) MCG/ACT inhaler Inhale 2 puffs into the lungs every 6 hours as needed for Wheezing          No current facility-administered medications for this visit. Social History               Socioeconomic History    Marital status:        Spouse name: Not on file    Number of children: Not on file    Years of education: Not on file    Highest education level: Not on file   Occupational History    Not on file   Tobacco Use    Smoking status: Every Day       Packs/day: 0.50       Types: Cigarettes    Smokeless tobacco: Never   Vaping Use    Vaping Use: Never used   Substance and Sexual Activity    Alcohol use: Yes       Comment: occ    Drug use: Never    Sexual activity: Not on file   Other Topics Concern    Not on file   Social History Narrative    Not on file      Social Determinants of Health      Financial Resource Strain: Not on file   Food Insecurity: Not on file   Transportation Needs: Not on file   Physical Activity: Not on file   Stress: Not on file   Social Connections: Not on file   Intimate Partner Violence: Not on file   Housing Stability: Not on file            Physical Examination:  /82   Pulse 84   Ht 5' 6\" (1.676 m)   Wt 181 lb (82.1 kg)   BMI 29.21 kg/m²   Physical Exam  Vitals reviewed. Constitutional:       Appearance: She is well-developed. Neck:     Vascular: No carotid bruit or JVD. Cardiovascular:     Rate and Rhythm: Normal rate and regular rhythm. Heart sounds: Normal heart sounds. No murmur heard. No friction rub. No gallop. Pulmonary:     Effort: Pulmonary effort is normal. No respiratory distress. Breath sounds: Normal breath sounds. No wheezing or rales. Abdominal:     General: There is no distension. Tenderness: There is no abdominal tenderness. Lymphadenopathy:     Cervical: No cervical adenopathy. Skin:     General: Skin is warm and dry.                ASSESSMENT:       Diagnosis Orders 1. Nonischemic cardiomyopathy ([facility] [address]         2. Left bundle branch block         3. Chronic combined systolic and diastolic CHF, NYHA class 3 (HCC)                PLAN:  No orders of the defined types were placed in this encounter. Encounter Medications    No orders of the defined types were placed in this encounter. Continue present medications discontinue Entresto when she is out of her current bottle  Lisinopril 10 mg p.o. twice daily  Recommend biventricular ICD implantation advised indication alternatives benefits and risk patient agreeable. Arrange as soon as feasible. I have discussed with the patient regarding indications for the proposed procedure ICD/ Pacemaker implantation along with possible alternatives benefits and risks including but not limited to risks of death, myocardial infarction, stroke, contrast induced nephropathy which in some cases may lead to acute kidney failure requiring dialysis, allergic reactions, infections which may require treatment with antibiotics or removal of the device, bleeding requiring blood transfusion,  cardiac arrhthymias, respiratory failure which may require placing the patient on respiratory support such as a ventilator or breathing machine,risk of complications which may require vascular surgery,risks of collapsing the lung with development of a pneumothorax which may require placement of a chest tube, and risks of lead dislodgement which may require follow up surgery and repositioning of the leads. In addition there are long term risks including infection, and device or component failure which may require removal and/or replacement of various components. Risk of wound nonhealing t subsequent erosion etc. also discussed. We also discussed the risk of potential stroke or thromboembolic phenomena during the timeframe that the patient may be off off of anticoagulation.  The patient is advised the device battery will eventually become depleted and require replacement at some point. The patient is awake and alert and understands the issues involved and indicates willingness to proceed as ordered. The patient is  a reasonable candidate for moderate conscious sedation. ASA score:  ASA 3 - Patient with moderate systemic disease with functional limitations     Mallampati: I (soft palate, uvula, fauces, tonsillar pillars visible)     Preferred vascular access site will be: left subclavian vein. Return in about 3 months (around 10/28/2022) for return to Dr. Lilibeth Lovelace only. Marian Walsh MD 7/28/2022 10:16 AM CDT     [facility]        Thisdictation was generated by voice recognition computer software. Although all attempts are made to edit the dictation for accuracy, there may be errors in the transcription that are not intended.                                 No significant interval history change since above visit

## 2022-08-10 NOTE — DISCHARGE INSTRUCTIONS
Biventricular Pacemaker Placement: What to Expect at [facility] pacemaker placement is surgery to put a biventricular pacemaker in your chest. Your doctor made a cut (incision) just below your collarbone. The doctor put the pacemaker leads through the cut, into a large blood vessel, then into the heart. The doctor put the pacemaker under the skin of your chestand attached the leads to it. Your chest may be sore where the doctor made the cut. You also may have a bruise and mild swelling. These symptoms usually get better in 1 to 2 weeks. You may feel a hard ridge along the incision. This usually gets softer in the months after surgery. You may be able to see or feel the outline of thepacemaker under your skin. You will probably be able to go back to work or your usual routine 1 to 2 weeksafter surgery. Pacemaker batteries usually last 5 to 15 years. Your doctor will talk to youabout how often you will need to have your pacemaker checked. You'll need to take steps to safely use electric devices. Some of these devices can stop your pacemaker from working right for a short time. Check with your doctor about what to avoid and what to keep a short distance away from your pacemaker. For example, you will need to stay away from things with strong magnetic and electrical fields. An example is an MRI machine (unless your pacemaker is safe for an MRI). You can use a cellphone and other wireless devices, but keep them at least 6 inches away from your pacemaker. Many household and office electronics don't affect a pacemaker. These includekitchen appliances and computers. This care sheet gives you a general idea about how long it will take for you to recover. But each person recovers at a different pace. Follow the steps belowto get better as quickly as possible. How can you care for yourself at home? Activity    Rest when you feel tired. Be active. Walking is a good choice.      For 4 to 6 weeks: Avoid activities that strain your chest or upper arm muscles. This includes pushing a  or vacuum, mopping floors, swimming, or swinging a golf club or tennis racquet. Do not raise your arm (the one on the side of your body where the pacemaker is located) above your shoulder. Allow your body to heal. Don't move quickly or lift anything heavy until you are feeling better. Many people are able to return to work within 1 to 2 weeks after surgery. Ask your doctor when it is okay for you to have sex. Diet    You can eat your normal diet. If your stomach is upset, try bland, low-fat foods like plain rice, broiled chicken, toast, and yogurt. Medicines    Your doctor will tell you if and when you can restart your medicines. You will also get instructions about taking any new medicines. If you take aspirin or some other blood thinner, ask your doctor if and when to start taking it again. Make sure that you understand exactly what your doctor wants you to do. Be safe with medicines. Read and follow all instructions on the label. If the doctor gave you a prescription medicine for pain, take it as prescribed. If you are not taking a prescription pain medicine, ask your doctor if you can take an over-the-counter medicine. Do not take aspirin, ibuprofen (Advil, Motrin), naproxen (Aleve), or other nonsteroidal anti-inflammatory drugs (NSAIDs) unless your doctor says it is okay. If your doctor prescribed antibiotics, take them as directed. Do not stop taking them just because you feel better. You need to take the full course of antibiotics. Incision care    If you have strips of tape on the incision, leave the tape on for a week or until it falls off. Keep the incision dry while it heals. Your doctor may recommend sponge baths for about 7 days, but do not get the incision wet. Your doctor will let you know when you may take showers. After a shower, pat the incision dry.      Don't use hydrogen peroxide or alcohol on the incision, which can slow healing. You may cover the area with a gauze bandage if it oozes fluid or rubs against clothing. Change the bandage every day. Do not take a bath or get into a hot tub for the first 2 weeks, or until your doctor tells you it is okay. Other instructions    Keep a medical ID card with you at all times that says you have a pacemaker. The card should include the  and model information. Wear medical alert jewelry stating that you have a pacemaker. You can buy this at most [employer]. Check your pulse as directed by your doctor. Have your pacemaker checked as often as your doctor recommends. In some cases, this may be done over the phone or online. Your doctor will give you instructions about how to do this. Follow-up care is a key part of your treatment and safety. Be sure to make and go to all appointments, and call your doctor if you are having problems. It's also a good idea to know your test results and keep alist of the medicines you take. When should you call for help? Call 911 anytime you think you may need emergency care. For example, call if:    You passed out (lost consciousness). You have trouble breathing. Call your doctor now or seek immediate medical care if:    You are dizzy or light-headed, or you feel like you may faint. You have pain that does not get better after you take pain medicine. You hear an alarm or feel a vibration from your pacemaker. You have loose stitches, or your incision comes open. Bright red blood has soaked through the bandage over your incision. You have signs of infection, such as: Increased pain, swelling, warmth, or redness. Red streaks leading from the incision. Pus draining from the incision. A fever. Watch closely for changes in your health, and be sure to contact your doctor if:    You have any problems with your pacemaker.    Where can you learn more?  Go to [email or web address] org and sign in to your [employer] account. Enter T984 in the [facility] box to learn more about \"Biventricular Pacemaker Placement: What to Expect at Home. \"     If you do not have an account, please click on the \"Sign Up Now\" link. Current as of: July 28, 2021               Content Version: 13.3  © 2006-2022 [employer]. Care instructions adapted under license by [facility] ([facility]). If you have questions about a medical condition or this instruction, always ask your healthcare professional. [address] any warranty or liability for your use of this information.

## 2022-08-10 NOTE — LETTER
[facility]  Cardiac Rehab Department  [address] [facility] [address]  [phone]  Toll Free [phone]              August 12, 2022    Dear Dameon Foss,    Please find enclosed information concerning the care of your pacemaker insertion site and the guidelines you should follow for the next four weeks of your recovery. Each of these recommendations apply unless you were specifically instructed otherwise by your cardiologist.    If you have not already received one, a transtelephonic patient transmitter has been ordered for you on your behalf. When in your possession and appropriate, unbox the transmitter, plug it in and complete your first pacemaker check by following the instructional pictures in the easy-to-follow pamphlet or on the transmitter itself. In the event you have a 'smartphone', an jose may have been downloaded on your phone during your hospitilization to allow you to use your phone for the same purpose. If any questions or concerns arise or you experience difficulty completing the steps stated above, you should contact your cardiologist's office for assistance. Rest assured that the use of your transmitter will be reviewed with you as needed during your upcoming follow-up visit in Dr. Boggs's office or via teleconference. Thank you.       To Your [facility],        [facility] Cardiac Rehab Staff    ANDREW Gerard, MA Kenton Taylor, RN  Exercise Physiologist  Registered Nurse

## 2022-08-11 VITALS
HEART RATE: 84 BPM | TEMPERATURE: 97.5 F | BODY MASS INDEX: 28.67 KG/M2 | WEIGHT: 178.4 LBS | DIASTOLIC BLOOD PRESSURE: 49 MMHG | SYSTOLIC BLOOD PRESSURE: 91 MMHG | RESPIRATION RATE: 16 BRPM | OXYGEN SATURATION: 93 % | HEIGHT: 66 IN

## 2022-08-11 LAB
EKG P AXIS: 77 DEGREES
EKG P AXIS: NORMAL DEGREES
EKG P-R INTERVAL: 144 MS
EKG P-R INTERVAL: NORMAL MS
EKG Q-T INTERVAL: 416 MS
EKG Q-T INTERVAL: 438 MS
EKG QRS DURATION: 152 MS
EKG QRS DURATION: 162 MS
EKG QTC CALCULATION (BAZETT): 459 MS
EKG QTC CALCULATION (BAZETT): 467 MS
EKG T AXIS: -77 DEGREES
EKG T AXIS: 165 DEGREES

## 2022-08-11 PROCEDURE — 99024 POSTOP FOLLOW-UP VISIT: CPT | Performed by: INTERNAL MEDICINE

## 2022-08-11 PROCEDURE — 2580000003 HC RX 258: Performed by: INTERNAL MEDICINE

## 2022-08-11 PROCEDURE — 6370000000 HC RX 637 (ALT 250 FOR IP): Performed by: INTERNAL MEDICINE

## 2022-08-11 PROCEDURE — 93005 ELECTROCARDIOGRAM TRACING: CPT | Performed by: INTERNAL MEDICINE

## 2022-08-11 PROCEDURE — 6360000002 HC RX W HCPCS: Performed by: INTERNAL MEDICINE

## 2022-08-11 RX ADMIN — CARVEDILOL 6.25 MG: 6.25 TABLET, FILM COATED ORAL at 08:24

## 2022-08-11 RX ADMIN — LISINOPRIL 10 MG: 10 TABLET ORAL at 11:44

## 2022-08-11 RX ADMIN — FUROSEMIDE 40 MG: 40 TABLET ORAL at 08:24

## 2022-08-11 RX ADMIN — WATER 1000 MG: 1 INJECTION INTRAMUSCULAR; INTRAVENOUS; SUBCUTANEOUS at 02:13

## 2022-08-11 RX ADMIN — SPIRONOLACTONE 25 MG: 25 TABLET ORAL at 08:24

## 2022-08-11 NOTE — DISCHARGE SUMMARY
Study:Pacemaker and Venous access. Risk Factors   - The patient's last creatinine was 0.9 mg/dl. Allergies   - Shellfish.   - Morphine.   - Other allergy:(Nickel). - Shellfish:(Scallops). Impression   Successful ultrasound/duplex guided access of a patent left subclavian  vein for pacemaker placement. Signature   ----------------------------------------------------------------  Electronically signed by Zehra Obrien MD(Interpreting  physician) on 08/10/2022 11:49 AM  ----------------------------------------------------------------      XR CHEST PORTABLE    Result Date: 8/10/2022  NO PRIOR REPORT AVAILABLE Exam: X-RAY OF [facility] Clinical data:Pacemaker placement, rule out pneumothorax. Technique:Single view of the chest. Prior studies: Radiographs of the chest done on same day. CTA of the chest dated 3/30/2022 images. Findings:  Interval placement of left-sided pacemakerwith no evidence of pneumothorax. The lungs are grossly clear; noevidence of acute infiltrate or pleural effusion. Cardiac silhouette is within normal limits. No acute osseous abnormality is detected. No other change. Interval placement of left-sided pacemaker with no evidence of pneumothorax. Recommendation: Follow up as clinically indicated. Electronically Signed by Rosina Calderón MD at 10-Aug-2022 03:23:20 PM               Pertinent Labs:   CBC:   Recent Labs     08/10/22  0835   WBC 11.2*   HGB 13.8        BMP:    Recent Labs     08/10/22  0835   *   K 5.0   CL 99   CO2 24   BUN 31*   CREATININE 0.9   GLUCOSE 114*     INR: No results for input(s): INR in the last 72 hours. Lipids: No results for input(s): CHOL, HDL in the last 72 hours. Invalid input(s): LDLCALCU  ABGs:No results for input(s): PHART, VIO4HWK, PO2ART, CDH1AUA, BEART, HGBAE, N1TLLEDV, CARBOXHGBART, 02THERAPY in the last 72 hours. HgBA1c:  No results for input(s): LABA1C in the last 72 hours.     Procedures: Biventricular ICD implant    Hospital Course: Patient admitted yesterday underwent elective biventricular ICD implant left infraclavicular approach utilizing ultrasound guidance for cannulation of the subclavian vein 2 separate locations. All 3 leads placed uneventfully good parameters on leads tolerated well. Kept overnight today doing well no hematoma. Postprocedural chest x-ray no pneumothorax or other complications. Device interrogation satisfactory device and lead function. Now felt to be stable and appropriate for discharge will be discharged in stable improved condition I personally gave her oral and written wound care instructions follow-up in the device clinic in 8 to 10 days as ordered. Physical Exam:    Vital Signs: BP (!) 91/49   Pulse 84   Temp 97.5 °F (36.4 °C) (Temporal)   Resp 16   Ht 5' 6\" (1.676 m)   Wt 178 lb 6.4 oz (80.9 kg)   SpO2 93%   BMI 28.79 kg/m²     Physical Exam      Discharge Medications:         Medication List        CHANGE how you take these medications      carvedilol 6.25 MG tablet  Commonly known as: COREG  Take 1 tablet by mouth in the morning and 1 tablet in the evening. Take with meals. What changed: additional instructions     lisinopril 10 MG tablet  Commonly known as: PRINIVIL;ZESTRIL  Take 1 tablet by mouth in the morning and at bedtime  What changed: additional instructions     spironolactone 25 MG tablet  Commonly known as: ALDACTONE  Take 1 tablet by mouth daily  What changed: additional instructions            CONTINUE taking these medications      albuterol sulfate  (90 Base) MCG/ACT inhaler  Commonly known as: PROVENTIL;VENTOLIN;PROAIR     famotidine 10 MG tablet  Commonly known as: PEPCID     furosemide 40 MG tablet  Commonly known as: Lasix  Take 1 tablet by mouth in the morning.      ipratropium-albuterol 0.5-2.5 (3) MG/3ML Soln nebulizer solution  Commonly known as: DUONEB  Inhale 3 mLs into the lungs every 4 hours as needed for Shortness of Breath     melatonin 3 MG Tabs tablet Discharge Instructions:   [facility] [address] [address] [phone]  Follow up on 8/19/2022  10:00 am Device/Wound [address] [facility] [facility] [address] \[facility]\""  [phone]    Follow up on 9/22/2022  11:15 am        Take medications as directed. Resume activity as tolerated. Diet: ADULT DIET;  Regular     Disposition: Patient is medically stable and will be discharged *    Rico Davis MD, 8/11/2022 11:58 AM

## 2022-08-15 NOTE — PROGRESS NOTES
Physician Progress Note      PATIENT:               Bolivar Roberts  CSN #:                  [id number]  :                       1960  ADMIT DATE:       8/10/2022 8:17 AM  DISCH DATE:        2022 12:20 PM  RESPONDING  PROVIDER #:        Houston SOILS MD          QUERY TEXT:    Patient admitted with dilated cardiomyopathy and congestive heart failure. Noted documentation of Acute  on Chronic systolic and diastolic CHF on the   procedure note and Acute HFrEF on discharge summary. The medical record reflects the following:  Risk Factors: CHF, HTN  Clinical Indicators: Documentation as noted above, BNP: 907,  Treatment: Biventricular ICD implant,  Lasix 40mg    Thank you in advance,    Latanya Hammond, RN-BSN, [facility]  Clinical   [facility],  [email or web address]  Options provided:  -- Acute on Chronic Systolic CHF/HFrEF  -- Acute on Chronic Diastolic CHF/HFpEF  -- Acute on Chronic Systolic and Diastolic CHF  -- Acute Systolic CHF/HFrEF  -- Acute Diastolic CHF/HFpEF  -- Acute Systolic and Diastolic CHF  -- Chronic Systolic CHF/HFrEF  -- Chronic Diastolic CHF/HFpEF  -- Chronic Systolic and Diastolic CHF  -- Other - I will add my own diagnosis  -- Disagree - Not applicable / Not valid  -- Disagree - Clinically unable to determine / Unknown  -- Refer to Clinical Documentation Reviewer    PROVIDER RESPONSE TEXT:    This patient is in acute on chronic systolic CHF/HFrEF.     Query created by: Namrata Minaya on 2022 1:37 PM      Electronically signed by:  Sandra Lutz MD 8/15/2022 12:07 PM

## 2022-08-18 ENCOUNTER — NURSE ONLY (OUTPATIENT)
Dept: CARDIOLOGY CLINIC | Age: 62
End: 2022-08-18

## 2022-08-18 DIAGNOSIS — Z51.89 WOUND CHECK, ABSCESS: Primary | ICD-10-CM

## 2022-08-18 PROCEDURE — 99024 POSTOP FOLLOW-UP VISIT: CPT | Performed by: NURSE PRACTITIONER

## 2022-08-18 NOTE — PROGRESS NOTES
Patient here for a wound check visit status post ICD implant. Incision dry and intact. No redness, swelling, or drainage noted  Edges well approximated. Skin glue intact. See wound photo. Instructed patient to wash area with antibacterial soap and keep it clean and dry. Reviewed discharged instructions and questions answered. Reviewed [employer] home monitor and patient verbalized understanding. Patient is using the phone jose.   Follow up as scheduled

## 2022-08-24 ENCOUNTER — TELEPHONE (OUTPATIENT)
Dept: CARDIOLOGY CLINIC | Age: 62
End: 2022-08-24

## 2022-08-24 NOTE — TELEPHONE ENCOUNTER
Received a call from patient advising that ever since starting the lisinopril she has had a constant stomach ache. Patient has been approved for patient assistance program for entresto and waiting on her phone call to set up her first shipment. Patient inquiring what she can do in the meantime to help with the stomach discomfort. She has tried taking with and without food.

## 2022-08-25 NOTE — TELEPHONE ENCOUNTER
Per Dr. Chintan Marques that is not a known side effect of Lisinopril. She can just wait for entresto shipment.

## 2022-09-22 ENCOUNTER — OFFICE VISIT (OUTPATIENT)
Dept: CARDIOLOGY CLINIC | Age: 62
End: 2022-09-22

## 2022-09-22 VITALS
BODY MASS INDEX: 29.09 KG/M2 | HEART RATE: 91 BPM | DIASTOLIC BLOOD PRESSURE: 70 MMHG | WEIGHT: 181 LBS | HEIGHT: 66 IN | SYSTOLIC BLOOD PRESSURE: 108 MMHG

## 2022-09-22 DIAGNOSIS — I42.8 NONISCHEMIC CARDIOMYOPATHY (HCC): Primary | ICD-10-CM

## 2022-09-22 DIAGNOSIS — I50.42 CHRONIC COMBINED SYSTOLIC AND DIASTOLIC CHF, NYHA CLASS 3 (HCC): ICD-10-CM

## 2022-09-22 DIAGNOSIS — I44.7 LEFT BUNDLE BRANCH BLOCK: ICD-10-CM

## 2022-09-22 DIAGNOSIS — Z95.810 BIVENTRICULAR ICD (IMPLANTABLE CARDIOVERTER-DEFIBRILLATOR) IN PLACE: ICD-10-CM

## 2022-09-22 PROCEDURE — 99214 OFFICE O/P EST MOD 30 MIN: CPT | Performed by: INTERNAL MEDICINE

## 2022-09-22 PROCEDURE — 93282 PRGRMG EVAL IMPLANTABLE DFB: CPT | Performed by: INTERNAL MEDICINE

## 2022-09-22 RX ORDER — SACUBITRIL AND VALSARTAN 24; 26 MG/1; MG/1
1 TABLET, FILM COATED ORAL 2 TIMES DAILY
COMMUNITY

## 2022-09-22 RX ORDER — CARVEDILOL 12.5 MG/1
12.5 TABLET ORAL 2 TIMES DAILY WITH MEALS
Qty: 180 TABLET | Refills: 3 | Status: SHIPPED | OUTPATIENT
Start: 2022-09-22 | End: 2022-10-20 | Stop reason: SDUPTHER

## 2022-09-22 ASSESSMENT — ENCOUNTER SYMPTOMS
DIARRHEA: 0
EYES NEGATIVE: 1
NAUSEA: 0
SHORTNESS OF BREATH: 0
VOMITING: 0
RESPIRATORY NEGATIVE: 1
GASTROINTESTINAL NEGATIVE: 1

## 2022-09-22 NOTE — PROGRESS NOTES
Remote ICD interrogation  Presenting rhythm: AS/, AP <0.1%,  97.6%  Battery status: 9.5 years  Lead status: lead impedance within range and stable  Sensing: P waves 0.9 mV,  R waves >20 mV  Thresholds:  Atrial 0.500 V @ 0.4ms, ventricular 0.500 V @ 0.4ms, LV 1.250 V @ 1.00 ms  Observations:  None  Next office visit:  10/13/22  Next remote home check scheduled for 12/22/22

## 2022-09-22 NOTE — PROGRESS NOTES
[facility] Progress Note                            Date:  9/22/2022  Patient: Holly Pitts  Age:  63 y.o., 1960      Reason for evaluation:         SUBJECTIVE:    Returns today follow-up assessment following recent biventricular ICD implant. General soreness is gradually diminishing. Dyspnea stable and improved. No other complaints or issues reported. Blood pressure 108/70 heart 91. She is now on Entresto and off of lisinopril. Review of Systems   Constitutional: Negative. Negative for chills, fever and unexpected weight change. HENT: Negative. Eyes: Negative. Respiratory: Negative. Negative for shortness of breath. Cardiovascular: Negative. Negative for chest pain. Gastrointestinal: Negative. Negative for diarrhea, nausea and vomiting. Endocrine: Negative. Genitourinary: Negative. Musculoskeletal: Negative. Skin: Negative. Neurological: Negative. All other systems reviewed and are negative. OBJECTIVE:     /70   Pulse 91   Ht 5' 6\" (1.676 m)   Wt 181 lb (82.1 kg)   BMI 29.21 kg/m²     Labs:   CBC: No results for input(s): WBC, HGB, HCT, PLT in the last 72 hours. BMP:No results for input(s): NA, K, CO2, BUN, CREATININE, LABGLOM, GLUCOSE in the last 72 hours. BNP: No results for input(s): BNP in the last 72 hours. PT/INR: No results for input(s): PROTIME, INR in the last 72 hours. APTT:No results for input(s): APTT in the last 72 hours. CARDIAC ENZYMES:No results for input(s): CKTOTAL, CKMB, CKMBINDEX, TROPONINI in the last 72 hours. FASTING LIPID PANEL:No results found for: HDL, LDLDIRECT, LDLCALC, TRIG  LIVER PROFILE:No results for input(s): AST, ALT, LABALBU in the last 72 hours.         Past Medical History:   Diagnosis Date    Asthma     CHF (congestive heart failure) (HCC)     Heart failure (HCC)     reduced ejection fx     Past Surgical History:   Procedure Laterality Date    ANKLE SURGERY Left     BREAST ENHANCEMENT SURGERY CARDIAC CATHETERIZATION      CARDIAC CATHETERIZATION       SECTION      SHOULDER SURGERY Right     X4     Family History   Problem Relation Age of Onset    High Blood Pressure Father      Allergies   Allergen Reactions    Morphine Nausea And Vomiting    Nickel Rash    Fish Allergy Nausea And Vomiting     Scallops       Current Outpatient Medications   Medication Sig Dispense Refill    sacubitril-valsartan (ENTRESTO) 24-26 MG per tablet Take 1 tablet by mouth 2 times daily      carvedilol (COREG) 6.25 MG tablet Take 1 tablet by mouth in the morning and 1 tablet in the evening. Take with meals. 60 tablet 3    ipratropium-albuterol (DUONEB) 0.5-2.5 (3) MG/3ML SOLN nebulizer solution Inhale 3 mLs into the lungs every 4 hours as needed for Shortness of Breath 90 mL 0    melatonin 3 MG TABS tablet Take 12 mg by mouth at bedtime      famotidine (PEPCID) 10 MG tablet Take 10 mg by mouth 2 times daily as needed      albuterol sulfate HFA (PROVENTIL;VENTOLIN;PROAIR) 108 (90 Base) MCG/ACT inhaler Inhale 2 puffs into the lungs every 6 hours as needed for Wheezing      lisinopril (PRINIVIL;ZESTRIL) 10 MG tablet Take 1 tablet by mouth in the morning and at bedtime (Patient not taking: Reported on 2022) 60 tablet 5    furosemide (LASIX) 40 MG tablet Take 1 tablet by mouth in the morning. 30 tablet 3    spironolactone (ALDACTONE) 25 MG tablet Take 1 tablet by mouth daily (Patient taking differently: Take 25 mg by mouth in the morning. 8AM.) 90 tablet 3     No current facility-administered medications for this visit.      Social History     Socioeconomic History    Marital status:      Spouse name: Not on file    Number of children: Not on file    Years of education: Not on file    Highest education level: Not on file   Occupational History    Not on file   Tobacco Use    Smoking status: Every Day     Packs/day: 0.25     Types: Cigarettes    Smokeless tobacco: Never   Vaping Use    Vaping Use: Never used Substance and Sexual Activity    Alcohol use: Yes     Comment: Beer very rarely    Drug use: Never    Sexual activity: Not on file   Other Topics Concern    Not on file   Social History Narrative    Not on file     Social Determinants of Health     Financial Resource Strain: Not on file   Food Insecurity: Not on file   Transportation Needs: Not on file   Physical Activity: Not on file   Stress: Not on file   Social Connections: Not on file   Intimate Partner Violence: Not on file   Housing Stability: Not on file       Physical Examination:  /70   Pulse 91   Ht 5' 6\" (1.676 m)   Wt 181 lb (82.1 kg)   BMI 29.21 kg/m²   Physical Exam  Vitals reviewed. Constitutional:       Appearance: She is well-developed. Neck:      Vascular: No carotid bruit or JVD. Cardiovascular:      Rate and Rhythm: Normal rate and regular rhythm. Heart sounds: Normal heart sounds. No murmur heard. No friction rub. No gallop. Pulmonary:      Effort: Pulmonary effort is normal. No respiratory distress. Breath sounds: Normal breath sounds. No wheezing or rales. Abdominal:      General: There is no distension. Tenderness: There is no abdominal tenderness. Lymphadenopathy:      Cervical: No cervical adenopathy. Skin:     General: Skin is warm and dry. ASSESSMENT:     Diagnosis Orders   1. Nonischemic cardiomyopathy ([address]        2. Chronic combined systolic and diastolic CHF, NYHA class 3 ([address]        3. Left bundle branch block        4. Biventricular ICD (implantable cardioverter-defibrillator) in place            PLAN:  No orders of the defined types were placed in this encounter. No orders of the defined types were placed in this encounter. Continue present medications  Increase Coreg 12.5 mg p.o. twice daily  Recommend follow-up assessment in 1 month  Instructed to take Lasix as needed for significant weight gain or edema.       Return in about 4 weeks (around 10/20/2022) for return to Dr. Quique Nelson only. Rc Agosto MD 9/22/2022 11:02 AM CDT    [facility]      Thisdictation was generated by voice recognition computer software. Although all attempts are made to edit the dictation for accuracy, there may be errors in the transcription that are not intended.

## 2022-09-26 ENCOUNTER — TELEPHONE (OUTPATIENT)
Dept: CARDIOLOGY CLINIC | Age: 62
End: 2022-09-26

## 2022-09-26 NOTE — TELEPHONE ENCOUNTER
Patient is calling in advising that her whole body feels like she's tingling like she's getting a sunburn, patient states it's difficult to describe and she also feels like she's wheezing. Patient is unsure if it's related but Dr. Brianna Fonseca recently stopped her lasix and advised her to double her coreg which she just started yesterday.

## 2022-09-26 NOTE — TELEPHONE ENCOUNTER
Spoke with patient and advised per Una Rust to go back to prior dose of coreg for one week and see if side effects goes away and call back to let me know. She voiced understanding.

## 2022-10-06 ENCOUNTER — TELEPHONE (OUTPATIENT)
Dept: CARDIOLOGY CLINIC | Age: 62
End: 2022-10-06

## 2022-10-06 NOTE — TELEPHONE ENCOUNTER
----- Message from Norma Guillermo MA sent at 9/29/2022  1:27 PM CDT -----  Regarding: FW: Stitch abscess?    ----- Message -----  From: Vivien Severance  Sent: 9/29/2022   1:26 PM CDT  To: [facility] Staff  Subject: Stitch abscess? I have what seems to be a stitch abscess. My incision closed nicely, but now I have a little hole. It is still just the one. I put on a tiny amount of bacitracin and am keeping an eye on it. No fever, area is not warm. I was told when I called yesterday that I could upload photos on this site but dont see how.

## 2022-10-06 NOTE — TELEPHONE ENCOUNTER
Spoke to patient. She stated the incision has a scab over. She stated there is not signs of infection. She spoke to someone last week. Patient stated had problems with her shoulder healing in the past.  Gave her instructions on how to send a phone through [employer]. She stated she will send a photo.

## 2022-10-11 ENCOUNTER — TELEPHONE (OUTPATIENT)
Dept: CARDIOLOGY CLINIC | Age: 62
End: 2022-10-11

## 2022-10-11 NOTE — TELEPHONE ENCOUNTER
Pt states since she increased her coreg to 12.5 mg she feels dizzy, her legs are weak, can't walk straight. Pt wants to know if she should go back down to 6.25 or wait to see if she adjust to this dose. Dose changed was on 9-22-22.

## 2022-10-11 NOTE — TELEPHONE ENCOUNTER
Per Sebastián, she was told to reduce to prior dose for one week and see if it helps. It looks like she did this then went back to the 12.5 so she can stay on the lower dose if she feels better.

## 2022-10-20 ENCOUNTER — OFFICE VISIT (OUTPATIENT)
Dept: CARDIOLOGY CLINIC | Age: 62
End: 2022-10-20

## 2022-10-20 VITALS
HEART RATE: 99 BPM | WEIGHT: 180 LBS | DIASTOLIC BLOOD PRESSURE: 72 MMHG | BODY MASS INDEX: 28.93 KG/M2 | HEIGHT: 66 IN | SYSTOLIC BLOOD PRESSURE: 114 MMHG

## 2022-10-20 DIAGNOSIS — I50.42 CHRONIC COMBINED SYSTOLIC AND DIASTOLIC CHF, NYHA CLASS 3 (HCC): ICD-10-CM

## 2022-10-20 DIAGNOSIS — I44.7 LEFT BUNDLE BRANCH BLOCK: ICD-10-CM

## 2022-10-20 DIAGNOSIS — I42.8 NONISCHEMIC CARDIOMYOPATHY (HCC): ICD-10-CM

## 2022-10-20 DIAGNOSIS — Z95.810 BIVENTRICULAR ICD (IMPLANTABLE CARDIOVERTER-DEFIBRILLATOR) IN PLACE: ICD-10-CM

## 2022-10-20 DIAGNOSIS — I42.8 NONISCHEMIC CARDIOMYOPATHY (HCC): Primary | ICD-10-CM

## 2022-10-20 LAB
ALBUMIN SERPL-MCNC: 4.4 G/DL (ref 3.5–5.2)
ALP BLD-CCNC: 129 U/L (ref 35–104)
ALT SERPL-CCNC: 23 U/L (ref 5–33)
ANION GAP SERPL CALCULATED.3IONS-SCNC: 12 MMOL/L (ref 7–19)
AST SERPL-CCNC: 17 U/L (ref 5–32)
BILIRUB SERPL-MCNC: <0.2 MG/DL (ref 0.2–1.2)
BUN BLDV-MCNC: 21 MG/DL (ref 8–23)
CALCIUM SERPL-MCNC: 9.4 MG/DL (ref 8.8–10.2)
CHLORIDE BLD-SCNC: 101 MMOL/L (ref 98–111)
CO2: 24 MMOL/L (ref 22–29)
CREAT SERPL-MCNC: 0.8 MG/DL (ref 0.5–0.9)
GFR SERPL CREATININE-BSD FRML MDRD: >60 ML/MIN/{1.73_M2}
GLUCOSE BLD-MCNC: 128 MG/DL (ref 74–109)
POTASSIUM SERPL-SCNC: 4.3 MMOL/L (ref 3.5–5)
PRO-BNP: 544 PG/ML (ref 0–900)
SODIUM BLD-SCNC: 137 MMOL/L (ref 136–145)
TOTAL PROTEIN: 7.4 G/DL (ref 6.6–8.7)

## 2022-10-20 PROCEDURE — 99214 OFFICE O/P EST MOD 30 MIN: CPT | Performed by: INTERNAL MEDICINE

## 2022-10-20 RX ORDER — CARVEDILOL 6.25 MG/1
6.25 TABLET ORAL 2 TIMES DAILY WITH MEALS
Qty: 90 TABLET | Refills: 3
Start: 2022-10-20

## 2022-10-20 NOTE — PROGRESS NOTES
[facility] Progress Note                            Date:  10/20/2022  Patient: Kelsey Christopher  Age:  63 y.o., 1960      Reason for evaluation:         SUBJECTIVE:    Seen today follow-up's cardiomyopathy chronic combined systolic diastolic congestive heart failure biventricular ICD. Had some issues with some blisters developing over the wound but that seems to have resolved with a little bit of bacitracin. Denies ICD shock therapy denies chest pain dyspnea stable. Could not tolerate increased dose of Coreg. No other complaints or issues reported. Review of Systems      OBJECTIVE:     /72   Pulse 99   Ht 5' 6\" (1.676 m)   Wt 180 lb (81.6 kg)   BMI 29.05 kg/m²     Labs:   CBC: No results for input(s): WBC, HGB, HCT, PLT in the last 72 hours. BMP:No results for input(s): NA, K, CO2, BUN, CREATININE, LABGLOM, GLUCOSE in the last 72 hours. BNP: No results for input(s): BNP in the last 72 hours. PT/INR: No results for input(s): PROTIME, INR in the last 72 hours. APTT:No results for input(s): APTT in the last 72 hours. CARDIAC ENZYMES:No results for input(s): CKTOTAL, CKMB, CKMBINDEX, TROPONINI in the last 72 hours. FASTING LIPID PANEL:No results found for: HDL, LDLDIRECT, LDLCALC, TRIG  LIVER PROFILE:No results for input(s): AST, ALT, LABALBU in the last 72 hours.         Past Medical History:   Diagnosis Date    Asthma     CHF (congestive heart failure) (HCC)     Heart failure (HCC)     reduced ejection fx     Past Surgical History:   Procedure Laterality Date    ANKLE SURGERY Left     BREAST ENHANCEMENT SURGERY      CARDIAC CATHETERIZATION      CARDIAC CATHETERIZATION       SECTION      SHOULDER SURGERY Right     X4     Family History   Problem Relation Age of Onset    High Blood Pressure Father      Allergies   Allergen Reactions    Morphine Nausea And Vomiting    Nickel Rash    Fish Allergy Nausea And Vomiting     Scallops       Current Outpatient Medications Medication Sig Dispense Refill    carvedilol (COREG) 6.25 MG tablet Take 1 tablet by mouth 2 times daily (with meals) Indications: 12.5 mg makes pt dizzy and can't walk pt went back to lower dose on 10-11-22 90 tablet 3    sacubitril-valsartan (ENTRESTO) 24-26 MG per tablet Take 1 tablet by mouth 2 times daily      ipratropium-albuterol (DUONEB) 0.5-2.5 (3) MG/3ML SOLN nebulizer solution Inhale 3 mLs into the lungs every 4 hours as needed for Shortness of Breath 90 mL 0    melatonin 3 MG TABS tablet Take 12 mg by mouth at bedtime      famotidine (PEPCID) 10 MG tablet Take 10 mg by mouth 2 times daily as needed      albuterol sulfate HFA (PROVENTIL;VENTOLIN;PROAIR) 108 (90 Base) MCG/ACT inhaler Inhale 2 puffs into the lungs every 6 hours as needed for Wheezing      furosemide (LASIX) 40 MG tablet Take 1 tablet by mouth in the morning. 30 tablet 3    spironolactone (ALDACTONE) 25 MG tablet Take 1 tablet by mouth daily (Patient taking differently: Take 25 mg by mouth in the morning. 8AM.) 90 tablet 3     No current facility-administered medications for this visit.      Social History     Socioeconomic History    Marital status:      Spouse name: Not on file    Number of children: Not on file    Years of education: Not on file    Highest education level: Not on file   Occupational History    Not on file   Tobacco Use    Smoking status: Every Day     Packs/day: 0.25     Types: Cigarettes    Smokeless tobacco: Never   Vaping Use    Vaping Use: Never used   Substance and Sexual Activity    Alcohol use: Yes     Comment: Beer very rarely    Drug use: Never    Sexual activity: Not on file   Other Topics Concern    Not on file   Social History Narrative    Not on file     Social Determinants of Health     Financial Resource Strain: Not on file   Food Insecurity: Not on file   Transportation Needs: Not on file   Physical Activity: Not on file   Stress: Not on file   Social Connections: Not on file   Intimate Partner Violence: Not on file   Housing Stability: Not on file       Physical Examination:  /72   Pulse 99   Ht 5' 6\" (1.676 m)   Wt 180 lb (81.6 kg)   BMI 29.05 kg/m²   Physical Exam        ASSESSMENT:  1. Nonischemic cardiomyopathy  2. Chronic combined systolic diastolic congestive heart failure  3. Left bundle branch block anemia  4. Biventricular ICD implant      PLAN:  No orders of the defined types were placed in this encounter. Orders Placed This Encounter   Medications    carvedilol (COREG) 6.25 MG tablet     Sig: Take 1 tablet by mouth 2 times daily (with meals) Indications: 12.5 mg makes pt dizzy and can't walk pt went back to lower dose on 10-11-22     Dispense:  90 tablet     Refill:  3           Continue present medications  Laboratory studies as ordered  Recommend follow-up assessment in 1 month    Return in about 4 weeks (around 11/17/2022) for return to Dr. Caridad Cosme only. Rebekah Wright MD 10/20/2022 3:10 PM CDT    [facility]      Thisdictation was generated by voice recognition computer software. Although all attempts are made to edit the dictation for accuracy, there may be errors in the transcription that are not intended.

## 2022-11-21 ENCOUNTER — OFFICE VISIT (OUTPATIENT)
Dept: CARDIOLOGY CLINIC | Age: 62
End: 2022-11-21

## 2022-11-21 VITALS
WEIGHT: 178 LBS | DIASTOLIC BLOOD PRESSURE: 76 MMHG | BODY MASS INDEX: 28.61 KG/M2 | HEART RATE: 96 BPM | SYSTOLIC BLOOD PRESSURE: 110 MMHG | HEIGHT: 66 IN

## 2022-11-21 DIAGNOSIS — I50.42 CHRONIC COMBINED SYSTOLIC AND DIASTOLIC CHF, NYHA CLASS 3 (HCC): ICD-10-CM

## 2022-11-21 DIAGNOSIS — I44.7 LEFT BUNDLE BRANCH BLOCK: ICD-10-CM

## 2022-11-21 DIAGNOSIS — I42.8 NONISCHEMIC CARDIOMYOPATHY (HCC): Primary | ICD-10-CM

## 2022-11-21 DIAGNOSIS — Z95.810 BIVENTRICULAR ICD (IMPLANTABLE CARDIOVERTER-DEFIBRILLATOR) IN PLACE: ICD-10-CM

## 2022-11-21 PROCEDURE — 93282 PRGRMG EVAL IMPLANTABLE DFB: CPT | Performed by: INTERNAL MEDICINE

## 2022-11-21 PROCEDURE — 99214 OFFICE O/P EST MOD 30 MIN: CPT | Performed by: INTERNAL MEDICINE

## 2022-11-21 ASSESSMENT — ENCOUNTER SYMPTOMS
EYES NEGATIVE: 1
VOMITING: 0
RESPIRATORY NEGATIVE: 1
NAUSEA: 0
DIARRHEA: 0
GASTROINTESTINAL NEGATIVE: 1
SHORTNESS OF BREATH: 0

## 2022-11-21 NOTE — PROGRESS NOTES
[facility] Progress Note                            Date:  11/21/2022  Patient: Aid Doherty  Age:  63 y.o., 1960      Reason for evaluation:         SUBJECTIVE:    Return today follow-up assessment cardiomyopathy biventricular ICD chronic congestive heart failure somewhat weak and fatigued right now I noticed her BNP level recently 10/20/2022 was 544 the lowest its been in quite some time. She took Lasix twice last week may be slightly dried out I suggest that she uses only for definitive edema. Denies chest pain denies ICD shocks. Device interrogated today functioning appropriately. Blood pressure 110/76 heart 96. Review of Systems   Constitutional: Negative. Negative for chills, fever and unexpected weight change. HENT: Negative. Eyes: Negative. Respiratory: Negative. Negative for shortness of breath. Cardiovascular: Negative. Negative for chest pain. Gastrointestinal: Negative. Negative for diarrhea, nausea and vomiting. Endocrine: Negative. Genitourinary: Negative. Musculoskeletal: Negative. Skin: Negative. Neurological: Negative. All other systems reviewed and are negative. OBJECTIVE:     /76   Pulse 96   Ht 5' 6\" (1.676 m)   Wt 178 lb (80.7 kg)   BMI 28.73 kg/m²     Labs:   CBC: No results for input(s): WBC, HGB, HCT, PLT in the last 72 hours. BMP:No results for input(s): NA, K, CO2, BUN, CREATININE, LABGLOM, GLUCOSE in the last 72 hours. BNP: No results for input(s): BNP in the last 72 hours. PT/INR: No results for input(s): PROTIME, INR in the last 72 hours. APTT:No results for input(s): APTT in the last 72 hours. CARDIAC ENZYMES:No results for input(s): CKTOTAL, CKMB, CKMBINDEX, TROPONINI in the last 72 hours. FASTING LIPID PANEL:No results found for: HDL, LDLDIRECT, LDLCALC, TRIG  LIVER PROFILE:No results for input(s): AST, ALT, LABALBU in the last 72 hours.         Past Medical History:   Diagnosis Date    Asthma CHF (congestive heart failure) (HCC)     Heart failure (HCC)     reduced ejection fx    Upper respiratory infection      Past Surgical History:   Procedure Laterality Date    ANKLE SURGERY Left     BREAST ENHANCEMENT SURGERY      CARDIAC CATHETERIZATION      CARDIAC CATHETERIZATION       SECTION      SHOULDER SURGERY Right     X4     Family History   Problem Relation Age of Onset    High Blood Pressure Father      Allergies   Allergen Reactions    Morphine Nausea And Vomiting    Nickel Rash    Fish Allergy Nausea And Vomiting     Scallops       Current Outpatient Medications   Medication Sig Dispense Refill    carvedilol (COREG) 6.25 MG tablet Take 1 tablet by mouth 2 times daily (with meals) Indications: 12.5 mg makes pt dizzy and can't walk pt went back to lower dose on 10-11-22 90 tablet 3    sacubitril-valsartan (ENTRESTO) 24-26 MG per tablet Take 1 tablet by mouth 2 times daily      furosemide (LASIX) 40 MG tablet Take 1 tablet by mouth in the morning. 30 tablet 3    spironolactone (ALDACTONE) 25 MG tablet Take 1 tablet by mouth daily (Patient taking differently: Take 25 mg by mouth daily 8AM) 90 tablet 3    ipratropium-albuterol (DUONEB) 0.5-2.5 (3) MG/3ML SOLN nebulizer solution Inhale 3 mLs into the lungs every 4 hours as needed for Shortness of Breath 90 mL 0    melatonin 3 MG TABS tablet Take 12 mg by mouth at bedtime      famotidine (PEPCID) 10 MG tablet Take 10 mg by mouth 2 times daily as needed      albuterol sulfate HFA (PROVENTIL;VENTOLIN;PROAIR) 108 (90 Base) MCG/ACT inhaler Inhale 2 puffs into the lungs every 6 hours as needed for Wheezing       No current facility-administered medications for this visit.      Social History     Socioeconomic History    Marital status:      Spouse name: Not on file    Number of children: Not on file    Years of education: Not on file    Highest education level: Not on file   Occupational History    Not on file   Tobacco Use    Smoking status: Every Day     Packs/day: 0.25     Types: Cigarettes    Smokeless tobacco: Never   Vaping Use    Vaping Use: Never used   Substance and Sexual Activity    Alcohol use: Yes     Comment: Beer very rarely    Drug use: Never    Sexual activity: Not on file   Other Topics Concern    Not on file   Social History Narrative    Not on file     Social Determinants of Health     Financial Resource Strain: Not on file   Food Insecurity: Not on file   Transportation Needs: Not on file   Physical Activity: Not on file   Stress: Not on file   Social Connections: Not on file   Intimate Partner Violence: Not on file   Housing Stability: Not on file       Physical Examination:  /76   Pulse 96   Ht 5' 6\" (1.676 m)   Wt 178 lb (80.7 kg)   BMI 28.73 kg/m²   Physical Exam  Vitals reviewed. Constitutional:       Appearance: She is well-developed. Neck:      Vascular: No carotid bruit or JVD. Cardiovascular:      Rate and Rhythm: Normal rate and regular rhythm. Heart sounds: Normal heart sounds. No murmur heard. No friction rub. No gallop. Pulmonary:      Effort: Pulmonary effort is normal. No respiratory distress. Breath sounds: Normal breath sounds. No wheezing or rales. Abdominal:      General: There is no distension. Tenderness: There is no abdominal tenderness. Lymphadenopathy:      Cervical: No cervical adenopathy. Skin:     General: Skin is warm and dry. ASSESSMENT:     Diagnosis Orders   1. Nonischemic cardiomyopathy ([address]        2. Biventricular ICD (implantable cardioverter-defibrillator) in place        3. Chronic combined systolic and diastolic CHF, NYHA class 3 ([address]        4. Left bundle branch block            PLAN:  No orders of the defined types were placed in this encounter. No orders of the defined types were placed in this encounter.         Continue present medications  May try again to go up to 12.5 mg Coreg twice daily as tolerated   Suggest taking Lasix only as needed as a minimum as needed  Recommend follow-up assessment in 3 months    Return in about 3 months (around 2/21/2023) for return to Dr. Cecil Holbrook only. Bartolo Fraser MD 11/21/2022 3:36 PM [facility]      Thisdictation was generated by voice recognition computer software. Although all attempts are made to edit the dictation for accuracy, there may be errors in the transcription that are not intended.

## 2022-11-21 NOTE — PROGRESS NOTES
ICD interrogated  Presenting rhythm: AS/, AP <0.1%,  97.8%  Battery status: 8.0 years  Lead status: lead impedance within range and stable  Sensing: P waves 2.1 mV,  R waves >20 mV  Thresholds:  Atrial 0.500 V @ 0.4ms, ventricular 1.000 V @ 0.4ms, 1.625 V @ 1.00ms  Observations:  None  Reprogramming for sensitivity and threshold testing  [facility] home check scheduled for 02/22/23

## 2022-11-30 ENCOUNTER — HOSPITAL ENCOUNTER (EMERGENCY)
Age: 62
Discharge: HOME | End: 2022-11-30
Attending: EMERGENCY MEDICINE

## 2022-11-30 ENCOUNTER — APPOINTMENT (OUTPATIENT)
Dept: GENERAL RADIOLOGY | Age: 62
End: 2022-11-30

## 2022-11-30 VITALS
BODY MASS INDEX: 28.45 KG/M2 | HEIGHT: 66 IN | SYSTOLIC BLOOD PRESSURE: 86 MMHG | RESPIRATION RATE: 17 BRPM | DIASTOLIC BLOOD PRESSURE: 70 MMHG | TEMPERATURE: 97.9 F | OXYGEN SATURATION: 95 % | WEIGHT: 177 LBS | HEART RATE: 91 BPM

## 2022-11-30 DIAGNOSIS — J45.21 MILD INTERMITTENT ASTHMA WITH ACUTE EXACERBATION: ICD-10-CM

## 2022-11-30 DIAGNOSIS — R06.02 SHORTNESS OF BREATH: Primary | ICD-10-CM

## 2022-11-30 DIAGNOSIS — I50.22 CHRONIC SYSTOLIC CHF (CONGESTIVE HEART FAILURE) (HCC): ICD-10-CM

## 2022-11-30 LAB
ADENOVIRUS BY PCR: NOT DETECTED
ALBUMIN SERPL-MCNC: 4.2 G/DL (ref 3.5–5.2)
ALP BLD-CCNC: 115 U/L (ref 35–104)
ALT SERPL-CCNC: 35 U/L (ref 5–33)
ANION GAP SERPL CALCULATED.3IONS-SCNC: 12 MMOL/L (ref 7–19)
AST SERPL-CCNC: 20 U/L (ref 5–32)
ATYPICAL LYMPHOCYTE RELATIVE PERCENT: 2 % (ref 0–8)
BASE EXCESS VENOUS: 2 MMOL/L
BASOPHILS ABSOLUTE: 0 K/UL (ref 0–0.2)
BASOPHILS RELATIVE PERCENT: 0 % (ref 0–1)
BILIRUB SERPL-MCNC: 0.4 MG/DL (ref 0.2–1.2)
BORDETELLA PARAPERTUSSIS BY PCR: NOT DETECTED
BORDETELLA PERTUSSIS BY PCR: NOT DETECTED
BUN BLDV-MCNC: 24 MG/DL (ref 8–23)
CALCIUM SERPL-MCNC: 9.4 MG/DL (ref 8.8–10.2)
CARBOXYHEMOGLOBIN: 3.4 %
CHLAMYDOPHILIA PNEUMONIAE BY PCR: NOT DETECTED
CHLORIDE BLD-SCNC: 98 MMOL/L (ref 98–111)
CO2: 24 MMOL/L (ref 22–29)
CORONAVIRUS 229E BY PCR: NOT DETECTED
CORONAVIRUS HKU1 BY PCR: NOT DETECTED
CORONAVIRUS NL63 BY PCR: NOT DETECTED
CORONAVIRUS OC43 BY PCR: NOT DETECTED
CREAT SERPL-MCNC: 0.8 MG/DL (ref 0.5–0.9)
EOSINOPHILS ABSOLUTE: 0.43 K/UL (ref 0–0.6)
EOSINOPHILS RELATIVE PERCENT: 3 % (ref 0–5)
GFR SERPL CREATININE-BSD FRML MDRD: >60 ML/MIN/{1.73_M2}
GLUCOSE BLD-MCNC: 104 MG/DL (ref 74–109)
HCO3 VENOUS: 26 MMOL/L (ref 23–29)
HCT VFR BLD CALC: 42.8 % (ref 37–47)
HEMOGLOBIN: 14.5 G/DL (ref 12–16)
HUMAN METAPNEUMOVIRUS BY PCR: NOT DETECTED
HUMAN RHINOVIRUS/ENTEROVIRUS BY PCR: NOT DETECTED
IMMATURE GRANULOCYTES #: 0 K/UL
INFLUENZA A BY PCR: NOT DETECTED
INFLUENZA B BY PCR: NOT DETECTED
LYMPHOCYTES ABSOLUTE: 7.4 K/UL (ref 1.1–4.5)
LYMPHOCYTES RELATIVE PERCENT: 50 % (ref 20–40)
MACROCYTES: ABNORMAL
MCH RBC QN AUTO: 34.1 PG (ref 27–31)
MCHC RBC AUTO-ENTMCNC: 33.9 G/DL (ref 33–37)
MCV RBC AUTO: 100.7 FL (ref 81–99)
MONOCYTES ABSOLUTE: 0.1 K/UL (ref 0–0.9)
MONOCYTES RELATIVE PERCENT: 1 % (ref 0–10)
MYCOPLASMA PNEUMONIAE BY PCR: NOT DETECTED
NEUTROPHILS ABSOLUTE: 6.3 K/UL (ref 1.5–7.5)
NEUTROPHILS RELATIVE PERCENT: 44 % (ref 50–65)
O2 CONTENT, VEN: 18 ML/DL
O2 SAT, VEN: 84 %
PARAINFLUENZA VIRUS 1 BY PCR: NOT DETECTED
PARAINFLUENZA VIRUS 2 BY PCR: NOT DETECTED
PARAINFLUENZA VIRUS 3 BY PCR: NOT DETECTED
PARAINFLUENZA VIRUS 4 BY PCR: NOT DETECTED
PCO2, VEN: 39 MMHG (ref 40–50)
PDW BLD-RTO: 12.7 % (ref 11.5–14.5)
PH VENOUS: 7.43 (ref 7.35–7.45)
PLATELET # BLD: 168 K/UL (ref 130–400)
PLATELET SLIDE REVIEW: ADEQUATE
PMV BLD AUTO: 9.6 FL (ref 9.4–12.3)
PO2, VEN: 49 MMHG
POTASSIUM SERPL-SCNC: 4.2 MMOL/L (ref 3.5–5)
PRO-BNP: 409 PG/ML (ref 0–900)
RBC # BLD: 4.25 M/UL (ref 4.2–5.4)
RESPIRATORY SYNCYTIAL VIRUS BY PCR: NOT DETECTED
SARS-COV-2, PCR: NOT DETECTED
SODIUM BLD-SCNC: 134 MMOL/L (ref 136–145)
TOTAL PROTEIN: 7.1 G/DL (ref 6.6–8.7)
TROPONIN: <0.01 NG/ML (ref 0–0.03)
WBC # BLD: 14.3 K/UL (ref 4.8–10.8)

## 2022-11-30 PROCEDURE — 36600 WITHDRAWAL OF ARTERIAL BLOOD: CPT

## 2022-11-30 PROCEDURE — 94640 AIRWAY INHALATION TREATMENT: CPT

## 2022-11-30 PROCEDURE — 80053 COMPREHEN METABOLIC PANEL: CPT

## 2022-11-30 PROCEDURE — 84484 ASSAY OF TROPONIN QUANT: CPT

## 2022-11-30 PROCEDURE — 6360000002 HC RX W HCPCS: Performed by: EMERGENCY MEDICINE

## 2022-11-30 PROCEDURE — 0202U NFCT DS 22 TRGT SARS-COV-2: CPT

## 2022-11-30 PROCEDURE — 96374 THER/PROPH/DIAG INJ IV PUSH: CPT

## 2022-11-30 PROCEDURE — 36415 COLL VENOUS BLD VENIPUNCTURE: CPT

## 2022-11-30 PROCEDURE — 83880 ASSAY OF NATRIURETIC PEPTIDE: CPT

## 2022-11-30 PROCEDURE — 6370000000 HC RX 637 (ALT 250 FOR IP): Performed by: EMERGENCY MEDICINE

## 2022-11-30 PROCEDURE — 99285 EMERGENCY DEPT VISIT HI MDM: CPT

## 2022-11-30 PROCEDURE — 85025 COMPLETE CBC W/AUTO DIFF WBC: CPT

## 2022-11-30 PROCEDURE — 82803 BLOOD GASES ANY COMBINATION: CPT

## 2022-11-30 PROCEDURE — 71046 X-RAY EXAM CHEST 2 VIEWS: CPT

## 2022-11-30 RX ORDER — PREDNISONE 20 MG/1
TABLET ORAL
Qty: 15 TABLET | Refills: 0 | Status: SHIPPED | OUTPATIENT
Start: 2022-12-01

## 2022-11-30 RX ORDER — IPRATROPIUM BROMIDE AND ALBUTEROL SULFATE 2.5; .5 MG/3ML; MG/3ML
1 SOLUTION RESPIRATORY (INHALATION) ONCE
Status: COMPLETED | OUTPATIENT
Start: 2022-11-30 | End: 2022-11-30

## 2022-11-30 RX ORDER — METHYLPREDNISOLONE SODIUM SUCCINATE 125 MG/2ML
80 INJECTION, POWDER, LYOPHILIZED, FOR SOLUTION INTRAMUSCULAR; INTRAVENOUS ONCE
Status: COMPLETED | OUTPATIENT
Start: 2022-11-30 | End: 2022-11-30

## 2022-11-30 RX ADMIN — IPRATROPIUM BROMIDE AND ALBUTEROL SULFATE 1 AMPULE: 2.5; .5 SOLUTION RESPIRATORY (INHALATION) at 09:06

## 2022-11-30 RX ADMIN — METHYLPREDNISOLONE SODIUM SUCCINATE 80 MG: 125 INJECTION, POWDER, FOR SOLUTION INTRAMUSCULAR; INTRAVENOUS at 08:08

## 2022-11-30 ASSESSMENT — ENCOUNTER SYMPTOMS
DIARRHEA: 0
RHINORRHEA: 0
VOMITING: 0
EYE REDNESS: 0
ABDOMINAL PAIN: 0
SHORTNESS OF BREATH: 1
WHEEZING: 1
VOICE CHANGE: 0
EYE PAIN: 0
CHEST TIGHTNESS: 1
COUGH: 1

## 2022-11-30 ASSESSMENT — PAIN - FUNCTIONAL ASSESSMENT: PAIN_FUNCTIONAL_ASSESSMENT: NONE - DENIES PAIN

## 2022-11-30 NOTE — ED PROVIDER NOTES
[facility] EMERGENCY DEPT  EMERGENCY DEPARTMENT ENCOUNTER      Pt Name: Erendira Justice  MRN: [id number]  [place] 1960  Date of evaluation: 11/30/2022  Provider: Mylene Waters MD    [address] [place] [address]       Chief Complaint   Patient presents with    Cough     Pt arrived to the ed with c/o cough and shortness of breath. Onset 2 weeks. Pt states she was given steroid shot and Zpak per [facility] clinic for sinus infection and bronchitis. HISTORY OF PRESENT ILLNESS   (Location/Symptom, Timing/Onset,Context/Setting, Quality, Duration, Modifying Factors, Severity)  Note limiting factors. Erendira Justice is a 63 y.o. female who presents to the emergency department with complaint of cough and congestion. Was diagnosed with a \"sinus infection and bronchitis\" a couple weeks ago and was treated with a Zpack and prednisone. No viral swabs were done at that time. Had persisting cough, chest tightness, shortness of breath, generalized fatigue and weakness. Also has a history of asthma as well as heart failure. Has a pacemaker in place is on Coreg and Entresto. Says she is feeling like there is a lump in her upper chest which is similar to the feeling she had when she was diagnosed with heart failure several months ago. HPI    NursingNotes were reviewed. REVIEW OF SYSTEMS    (2-9 systems for level 4, 10 or more for level 5)     Review of Systems   Constitutional:  Positive for fatigue. Negative for fever. HENT:  Positive for congestion. Negative for rhinorrhea and voice change. Eyes:  Negative for pain and redness. Respiratory:  Positive for cough, chest tightness, shortness of breath and wheezing. Cardiovascular:  Negative for chest pain. Gastrointestinal:  Negative for abdominal pain, diarrhea and vomiting. Endocrine: Negative. Genitourinary: Negative. Musculoskeletal:  Negative for arthralgias and gait problem. Skin:  Negative for rash and wound.    Neurological:  Negative for weakness and headaches. Hematological: Negative. Psychiatric/Behavioral: Negative. All other systems reviewed and are negative. A complete review of systems was performed and is negative except as noted above in the HPI.        PAST MEDICAL HISTORY     Past Medical History:   Diagnosis Date    Asthma     CHF (congestive heart failure) ([facility]     Heart failure ([facility]     reduced ejection fx    Upper respiratory infection          SURGICAL HISTORY       Past Surgical History:   Procedure Laterality Date    ANKLE SURGERY Left     BREAST ENHANCEMENT SURGERY      CARDIAC CATHETERIZATION      CARDIAC CATHETERIZATION       SECTION      PACEMAKER INSERTION      SHOULDER SURGERY Right     X4         CURRENT MEDICATIONS       Discharge Medication List as of 2022 10:18 AM        CONTINUE these medications which have NOT CHANGED    Details   carvedilol (COREG) 6.25 MG tablet Take 1 tablet by mouth 2 times daily (with meals) Indications: 12.5 mg makes pt dizzy and can't walk pt went back to lower dose on 10-11-22, Disp-90 tablet, R-3Adjust Sig      sacubitril-valsartan (ENTRESTO) 24-26 MG per tablet Take 1 tablet by mouth 2 times dailyHistorical Med      furosemide (LASIX) 40 MG tablet Take 1 tablet by mouth in the morning., Disp-30 tablet, R-3Normal      spironolactone (ALDACTONE) 25 MG tablet Take 1 tablet by mouth daily, Disp-90 tablet, R-3Normal      ipratropium-albuterol (DUONEB) 0.5-2.5 (3) MG/3ML SOLN nebulizer solution Inhale 3 mLs into the lungs every 4 hours as needed for Shortness of Breath, Disp-90 mL, R-0Normal      melatonin 3 MG TABS tablet Take 12 mg by mouth at bedtimeHistorical Med      famotidine (PEPCID) 10 MG tablet Take 10 mg by mouth 2 times daily as neededHistorical Med      albuterol sulfate HFA (PROVENTIL;VENTOLIN;PROAIR) 108 (90 Base) MCG/ACT inhaler Inhale 2 puffs into the lungs every 6 hours as needed for WheezingHistorical Med             ALLERGIES     Morphine, Nickel, and Fish allergy    FAMILY HISTORY       Family History   Problem Relation Age of Onset    High Blood Pressure Father           SOCIAL HISTORY       Social History     Socioeconomic History    Marital status:      Spouse name: None    Number of children: None    Years of education: None    Highest education level: None   Tobacco Use    Smoking status: Every Day     Packs/day: 0.25     Types: Cigarettes    Smokeless tobacco: Never   Vaping Use    Vaping Use: Never used   Substance and Sexual Activity    Alcohol use: Yes     Comment: Beer very rarely    Drug use: Never       SCREENINGS    Leisa Coma Scale  Eye Opening: Spontaneous  Best Verbal Response: Oriented  Best Motor Response: Obeys commands  Leisa Coma Scale Score: 15        PHYSICAL EXAM    (up to 7 for level 4, 8 or more for level 5)     ED Triage Vitals [11/30/22 0712]   BP Temp Temp Source Heart Rate Resp SpO2 Height Weight   108/83 97.9 °F (36.6 °C) Oral (!) 106 22 98 % 5' 6\" (1.676 m) 177 lb (80.3 kg)       Physical Exam  Vitals and nursing note reviewed. Constitutional:       General: She is not in acute distress. Appearance: She is well-developed. She is not diaphoretic. HENT:      Head: Normocephalic and atraumatic. Eyes:      General: No scleral icterus. Neck:      Vascular: No JVD. Cardiovascular:      Rate and Rhythm: Normal rate and regular rhythm. Pulses:           Radial pulses are 2+ on the right side and 2+ on the left side. Dorsalis pedis pulses are 2+ on the right side and 2+ on the left side. Heart sounds: Normal heart sounds. No murmur heard. No friction rub. No gallop. Pulmonary:      Effort: Tachypnea, accessory muscle usage and prolonged expiration present. No respiratory distress. Breath sounds: No stridor. Wheezing and rhonchi present. No decreased breath sounds or rales.       Comments: Diffusely coarse breath sounds with significant amount of expiratory wheezing  Chest:      Chest wall: No tenderness. Abdominal:      General: There is no distension. Palpations: Abdomen is soft. Tenderness: There is no abdominal tenderness. There is no guarding or rebound. Musculoskeletal:         General: No deformity. Normal range of motion. Right lower leg: No edema. Left lower leg: No edema. Skin:     General: Skin is warm and dry. Coloration: Skin is not pale. Findings: No erythema. Neurological:      Mental Status: She is alert and oriented to person, place, and time. GCS: GCS eye subscore is 4. GCS verbal subscore is 5. GCS motor subscore is 6. Cranial Nerves: No cranial nerve deficit. Motor: No abnormal muscle tone. Coordination: Coordination normal.   Psychiatric:         Behavior: Behavior normal.         Judgment: Judgment normal.       DIAGNOSTIC RESULTS     EKG: All EKG's are interpreted by the Emergency Department Physician who either signs or Co-signs this chart in the absence of a cardiologist.        RADIOLOGY:   Non-plain film images such as CT, Ultrasound and MRI are read by the radiologist. [place] images are visualized and preliminarily interpreted by the emergency physician with the below findings:        Interpretation per the Radiologist below, if available at the time of this note:    XR CHEST (2 VW)   Final Result   No acute cardiopulmonary abnormality. Recommendation:    Follow up as clinically indicated.        Electronically Signed by Elvie Love at 30-Nov-2022 09:12:46 AM                     ED BEDSIDE ULTRASOUND:   Performed by ED Physician - none    LABS:  Labs Reviewed   CBC WITH AUTO DIFFERENTIAL - Abnormal; Notable for the following components:       Result Value    WBC 14.3 (*)     .7 (*)     MCH 34.1 (*)     Neutrophils % 44.0 (*)     Lymphocytes % 50.0 (*)     Lymphocytes Absolute 7.4 (*)     Macrocytes 1+ (*)     All other components within normal limits   COMPREHENSIVE METABOLIC PANEL - Abnormal; Notable for the following components:    Sodium 134 (*)     BUN 24 (*)     Alkaline Phosphatase 115 (*)     ALT 35 (*)     All other components within normal limits   VENOUS BLD GAS - Abnormal; Notable for the following components:    pCO2, Gera 39.0 (*)     All other components within normal limits   RESPIRATORY PANEL, MOLECULAR, WITH COVID-19   TROPONIN   BRAIN NATRIURETIC PEPTIDE       All other labs were within normal range or not returned as of this dictation. EMERGENCY DEPARTMENT COURSE and DIFFERENTIALDIAGNOSIS/MDM:   Vitals:    Vitals:    11/30/22 0815 11/30/22 0830 11/30/22 0932 11/30/22 0942   BP: 122/72 104/80 86/70 86/70   Pulse: 94 95 91    Resp: 19 25 17    Temp:       TempSrc:       SpO2: 97% 95% 95%    Weight:       Height:           [facility]    ED Course as of 11/30/22 1624   Wed Nov 30, 2022   0811 Pro-BNP: 409 [LORELEI]   0927 EKG shows atrial sensed ventricular paced rhythm with a rate of 93. Negative scar Bosa. No other interpretation available. QTC borderline at 49. [LORELEI]   6106 Patient reevaluated and reports significant improvement in symptoms. On repeat auscultation, still has some wheezing but significantly improved from initial evaluation. Work-up here shows no signs of CHF exacerbation with no pulmonary edema on chest x-ray or elevation in BNP. Discussed additional management options with patient. At this time she is comfortable discharge home given improvement after treatment here. But further discussion, sounds like the dose of steroids that she was on recently was not high enough and will restart a steroid taper at a higher dose. [LORELEI]      ED Course User Index  [LORELEI] Daren Lao MD     Evaluation and work-up here revealed no signs of emergent or life-threatening pathology that would necessitate admission for further work-up or management at this time.   Patient is felt to be stable for discharge home with return precautions for worsening of the condition or development of new concerning symptoms. Patient was encouraged to follow-up with their primary care doctor in the appropriate timeframe. Necessary prescriptions and information have been provided for treatment at home. Patient voices understanding and agreement with the plan. CONSULTS:  None    PROCEDURES:  Unless otherwise notedbelow, none     Procedures    FINAL IMPRESSION     1. Shortness of breath    2. Mild intermittent asthma with acute exacerbation    3. Chronic systolic CHF (congestive heart failure) [facility])          DISPOSITION/PLAN   DISPOSITION Decision To Discharge 11/30/2022 10:14:00 AM      No notes of EC Admission Criteria type on file.     PATIENT REFERRED TO:  [facility] EMERGENCY DEPT  [place]  [phone]    If symptoms worsen    DISCHARGE MEDICATIONS:  Discharge Medication List as of 11/30/2022 10:18 AM        START taking these medications    Details   predniSONE (DELTASONE) 20 MG tablet Take 60mg days 1-2 Take 40mg days 3-5 Take 20mg days 6-8, Disp-15 tablet, R-0Normal                (Please note that portions of this note were completed with a voice recognition program.  Efforts were made to edit the dictations butoccasionally words are mis-transcribed.)    Niels Fernandez MD (electronically signed)  Emergency Physician         Niels Fernandez., MD  11/30/22 [id number]

## 2022-11-30 NOTE — ED NOTES
Pt reports feeling a lot better after her breathing treatment and feeling like the lump in her throat is gone.       Son Landers RN  11/30/22 [id number]

## 2023-02-21 PROCEDURE — 93296 REM INTERROG EVL PM/IDS: CPT | Performed by: CLINICAL NURSE SPECIALIST

## 2023-02-21 PROCEDURE — 93295 DEV INTERROG REMOTE 1/2/MLT: CPT | Performed by: CLINICAL NURSE SPECIALIST

## 2023-02-22 DIAGNOSIS — Z95.810 BIVENTRICULAR ICD (IMPLANTABLE CARDIOVERTER-DEFIBRILLATOR) IN PLACE: Primary | ICD-10-CM

## 2023-02-22 DIAGNOSIS — I42.8 NONISCHEMIC CARDIOMYOPATHY (HCC): ICD-10-CM

## 2023-02-22 DIAGNOSIS — I50.42 CHRONIC COMBINED SYSTOLIC AND DIASTOLIC CHF, NYHA CLASS 3 (HCC): ICD-10-CM

## 2023-02-23 ENCOUNTER — OFFICE VISIT (OUTPATIENT)
Dept: CARDIOLOGY CLINIC | Age: 63
End: 2023-02-23

## 2023-02-23 VITALS
HEART RATE: 77 BPM | HEIGHT: 66 IN | SYSTOLIC BLOOD PRESSURE: 122 MMHG | BODY MASS INDEX: 30.86 KG/M2 | WEIGHT: 192 LBS | DIASTOLIC BLOOD PRESSURE: 74 MMHG

## 2023-02-23 DIAGNOSIS — Z95.810 BIVENTRICULAR ICD (IMPLANTABLE CARDIOVERTER-DEFIBRILLATOR) IN PLACE: ICD-10-CM

## 2023-02-23 DIAGNOSIS — I42.8 NONISCHEMIC CARDIOMYOPATHY (HCC): Primary | ICD-10-CM

## 2023-02-23 DIAGNOSIS — I44.7 LEFT BUNDLE BRANCH BLOCK: ICD-10-CM

## 2023-02-23 DIAGNOSIS — I50.42 CHRONIC COMBINED SYSTOLIC AND DIASTOLIC CHF, NYHA CLASS 3 (HCC): ICD-10-CM

## 2023-02-23 PROCEDURE — 99213 OFFICE O/P EST LOW 20 MIN: CPT | Performed by: INTERNAL MEDICINE

## 2023-02-23 ASSESSMENT — ENCOUNTER SYMPTOMS
DIARRHEA: 0
RESPIRATORY NEGATIVE: 1
NAUSEA: 0
EYES NEGATIVE: 1
GASTROINTESTINAL NEGATIVE: 1
SHORTNESS OF BREATH: 0
VOMITING: 0

## 2023-02-23 NOTE — PROGRESS NOTES
[facility] Progress Note                            Date:  2/23/2023  Patient: Kathy Benavides  Age:  63 y.o., 1960      Reason for evaluation:         SUBJECTIVE:    Returns today follow-up assessment for chronic congestive heart failure dilated cardiomyopathy biventricular ICD overall doing reasonably well. Device interrogated couple days ago doing well. Blood pressure 122/74 at 77. Occasional dizziness. She had gone back up on her Coreg dose to 12.5 p.o. twice daily before the holidays as directed by me and tolerating this reasonably well. No other complaints or issues reported. Review of Systems   Constitutional: Negative. Negative for chills, fever and unexpected weight change. HENT: Negative. Eyes: Negative. Respiratory: Negative. Negative for shortness of breath. Cardiovascular: Negative. Negative for chest pain. Gastrointestinal: Negative. Negative for diarrhea, nausea and vomiting. Endocrine: Negative. Genitourinary: Negative. Musculoskeletal: Negative. Skin: Negative. Neurological: Negative. All other systems reviewed and are negative. OBJECTIVE:     /74   Pulse 77   Ht 5' 6\" (1.676 m)   Wt 192 lb (87.1 kg)   BMI 30.99 kg/m²     Labs:   CBC: No results for input(s): WBC, HGB, HCT, PLT in the last 72 hours. BMP:No results for input(s): NA, K, CO2, BUN, CREATININE, LABGLOM, GLUCOSE in the last 72 hours. BNP: No results for input(s): BNP in the last 72 hours. PT/INR: No results for input(s): PROTIME, INR in the last 72 hours. APTT:No results for input(s): APTT in the last 72 hours. CARDIAC ENZYMES:No results for input(s): CKTOTAL, CKMB, CKMBINDEX, TROPONINI in the last 72 hours. FASTING LIPID PANEL:No results found for: HDL, LDLDIRECT, LDLCALC, TRIG  LIVER PROFILE:No results for input(s): AST, ALT, LABALBU in the last 72 hours.         Past Medical History:   Diagnosis Date    Asthma     CHF (congestive heart failure) ([facility] [address] Heart failure (HCC)     reduced ejection fx    Upper respiratory infection      Past Surgical History:   Procedure Laterality Date    ANKLE SURGERY Left     BREAST ENHANCEMENT SURGERY      CARDIAC CATHETERIZATION      CARDIAC CATHETERIZATION       SECTION      PACEMAKER INSERTION      SHOULDER SURGERY Right     X4     Family History   Problem Relation Age of Onset    High Blood Pressure Father      Allergies   Allergen Reactions    Morphine Nausea And Vomiting    Nickel Rash    Fish Allergy Nausea And Vomiting     Scallops       Current Outpatient Medications   Medication Sig Dispense Refill    carvedilol (COREG) 6.25 MG tablet Take 1 tablet by mouth 2 times daily (with meals) Indications: 12.5 mg makes pt dizzy and can't walk pt went back to lower dose on 10-11-22 (Patient taking differently: Take 12.5 mg by mouth 2 times daily (with meals)) 90 tablet 3    sacubitril-valsartan (ENTRESTO) 24-26 MG per tablet Take 1 tablet by mouth 2 times daily      ipratropium-albuterol (DUONEB) 0.5-2.5 (3) MG/3ML SOLN nebulizer solution Inhale 3 mLs into the lungs every 4 hours as needed for Shortness of Breath 90 mL 0    melatonin 3 MG TABS tablet Take 20 mg by mouth nightly as needed      famotidine (PEPCID) 10 MG tablet Take 10 mg by mouth daily as needed      albuterol sulfate HFA (PROVENTIL;VENTOLIN;PROAIR) 108 (90 Base) MCG/ACT inhaler Inhale 2 puffs into the lungs every 6 hours as needed for Wheezing      predniSONE (DELTASONE) 20 MG tablet Take 60mg days 1-2 Take 40mg days 3-5 Take 20mg days 6-8 (Patient not taking: Reported on 2023) 15 tablet 0    furosemide (LASIX) 40 MG tablet Take 1 tablet by mouth in the morning. 30 tablet 3    spironolactone (ALDACTONE) 25 MG tablet Take 1 tablet by mouth daily (Patient taking differently: Take 25 mg by mouth daily 8AM) 90 tablet 3     No current facility-administered medications for this visit.      Social History     Socioeconomic History    Marital status:  Spouse name: Not on file    Number of children: Not on file    Years of education: Not on file    Highest education level: Not on file   Occupational History    Not on file   Tobacco Use    Smoking status: Every Day     Packs/day: 0.25     Types: Cigarettes    Smokeless tobacco: Never   Vaping Use    Vaping Use: Never used   Substance and Sexual Activity    Alcohol use: Yes     Comment: Beer very rarely    Drug use: Never    Sexual activity: Not on file   Other Topics Concern    Not on file   Social History Narrative    Not on file     Social Determinants of Health     Financial Resource Strain: Not on file   Food Insecurity: Not on file   Transportation Needs: Not on file   Physical Activity: Not on file   Stress: Not on file   Social Connections: Not on file   Intimate Partner Violence: Not on file   Housing Stability: Not on file       Physical Examination:  /74   Pulse 77   Ht 5' 6\" (1.676 m)   Wt 192 lb (87.1 kg)   BMI 30.99 kg/m²   Physical Exam  Vitals reviewed. Constitutional:       Appearance: She is well-developed. Neck:      Vascular: No carotid bruit or JVD. Cardiovascular:      Rate and Rhythm: Normal rate and regular rhythm. Heart sounds: Normal heart sounds. No murmur heard. No friction rub. No gallop. Pulmonary:      Effort: Pulmonary effort is normal. No respiratory distress. Breath sounds: Normal breath sounds. No wheezing or rales. Abdominal:      General: There is no distension. Tenderness: There is no abdominal tenderness. Lymphadenopathy:      Cervical: No cervical adenopathy. Skin:     General: Skin is warm and dry. ASSESSMENT:     Diagnosis Orders   1. Nonischemic cardiomyopathy ([address]        2. Chronic combined systolic and diastolic CHF, NYHA class 3 ([address]        3. Biventricular ICD (implantable cardioverter-defibrillator) in place        4.  Left bundle branch block            PLAN:  No orders of the defined types were placed in this encounter. No orders of the defined types were placed in this encounter. Continue Coreg 12.5 mg p.o. twice daily  Continue Entresto 24/26 1 p.o. twice daily  Continue Aldactone 25 mg daily  Continue Lasix 40 mg daily  Follow-up assessment in 3 months    Return in about 3 months (around 5/23/2023) for return to Dr. Caridad Cosme only. Rebekah Wright MD 2/23/2023 2:36 PM [facility]      Thisdictation was generated by voice recognition computer software. Although all attempts are made to edit the dictation for accuracy, there may be errors in the transcription that are not intended.

## 2023-04-16 DIAGNOSIS — I42.8 NONISCHEMIC CARDIOMYOPATHY (HCC): ICD-10-CM

## 2023-04-17 RX ORDER — SPIRONOLACTONE 25 MG/1
25 TABLET ORAL DAILY
Qty: 90 TABLET | Refills: 3 | Status: SHIPPED | OUTPATIENT
Start: 2023-04-17

## 2023-05-25 ENCOUNTER — OFFICE VISIT (OUTPATIENT)
Dept: CARDIOLOGY CLINIC | Age: 63
End: 2023-05-25

## 2023-05-25 VITALS
BODY MASS INDEX: 30.53 KG/M2 | DIASTOLIC BLOOD PRESSURE: 76 MMHG | HEIGHT: 66 IN | WEIGHT: 190 LBS | HEART RATE: 78 BPM | SYSTOLIC BLOOD PRESSURE: 124 MMHG

## 2023-05-25 DIAGNOSIS — Z95.810 BIVENTRICULAR ICD (IMPLANTABLE CARDIOVERTER-DEFIBRILLATOR) IN PLACE: ICD-10-CM

## 2023-05-25 DIAGNOSIS — I50.42 CHRONIC COMBINED SYSTOLIC AND DIASTOLIC CHF, NYHA CLASS 3 (HCC): ICD-10-CM

## 2023-05-25 DIAGNOSIS — I42.8 NONISCHEMIC CARDIOMYOPATHY (HCC): Primary | ICD-10-CM

## 2023-05-25 DIAGNOSIS — I44.7 LEFT BUNDLE BRANCH BLOCK: ICD-10-CM

## 2023-05-25 PROCEDURE — 99214 OFFICE O/P EST MOD 30 MIN: CPT | Performed by: INTERNAL MEDICINE

## 2023-05-25 RX ORDER — CARVEDILOL 12.5 MG/1
12.5 TABLET ORAL 2 TIMES DAILY WITH MEALS
Qty: 180 TABLET | Refills: 3 | Status: SHIPPED | OUTPATIENT
Start: 2023-05-25

## 2023-05-25 ASSESSMENT — ENCOUNTER SYMPTOMS
EYES NEGATIVE: 1
RESPIRATORY NEGATIVE: 1
SHORTNESS OF BREATH: 0
GASTROINTESTINAL NEGATIVE: 1
VOMITING: 0
DIARRHEA: 0
NAUSEA: 0

## 2023-05-25 NOTE — PROGRESS NOTES
[facility] Progress Note                            Date:  5/25/2023  Patient: Naheed Bautista  Age:  63 y.o., 1960      Reason for evaluation:         SUBJECTIVE:    Returns today follow-up assessment follow-up for chronic congestive heart failure cardiomyopathy biventricular ICD placement left bundle branch block overall doing reasonably well. She has periods of increased edema at times now taking Lasix as needed. Denies chest pain no other complaints blood pressure 124/76 heart 78. Review of Systems   Constitutional: Negative. Negative for chills, fever and unexpected weight change. HENT: Negative. Eyes: Negative. Respiratory: Negative. Negative for shortness of breath. Cardiovascular: Negative. Negative for chest pain. Gastrointestinal: Negative. Negative for diarrhea, nausea and vomiting. Endocrine: Negative. Genitourinary: Negative. Musculoskeletal: Negative. Skin: Negative. Neurological: Negative. All other systems reviewed and are negative. OBJECTIVE:     /76   Pulse 78   Ht 5' 6\" (1.676 m)   Wt 190 lb (86.2 kg)   BMI 30.67 kg/m²     Labs:   CBC: No results for input(s): WBC, HGB, HCT, PLT in the last 72 hours. BMP:No results for input(s): NA, K, CO2, BUN, CREATININE, LABGLOM, GLUCOSE in the last 72 hours. BNP: No results for input(s): BNP in the last 72 hours. PT/INR: No results for input(s): PROTIME, INR in the last 72 hours. APTT:No results for input(s): APTT in the last 72 hours. CARDIAC ENZYMES:No results for input(s): CKTOTAL, CKMB, CKMBINDEX, TROPONINI in the last 72 hours. FASTING LIPID PANEL:No results found for: HDL, LDLDIRECT, LDLCALC, TRIG  LIVER PROFILE:No results for input(s): AST, ALT, LABALBU in the last 72 hours.         Past Medical History:   Diagnosis Date    Asthma     CHF (congestive heart failure) (HCC)     Heart failure (HCC)     reduced ejection fx    Upper respiratory infection      Past Surgical

## 2023-05-26 RX ORDER — SACUBITRIL AND VALSARTAN 24; 26 MG/1; MG/1
1 TABLET, FILM COATED ORAL 2 TIMES DAILY
Qty: 180 TABLET | Refills: 3 | Status: SHIPPED | OUTPATIENT
Start: 2023-05-26

## 2023-05-30 ENCOUNTER — TELEPHONE (OUTPATIENT)
Dept: CARDIOLOGY CLINIC | Age: 63
End: 2023-05-30

## 2023-06-05 NOTE — TELEPHONE ENCOUNTER
Have filled out patient application, waiting on Dr. Reva Cedeno to return from vacation to sign. Will fax as soon as he gets signed.

## 2023-06-11 PROBLEM — I50.43 ACUTE ON CHRONIC COMBINED SYSTOLIC (CONGESTIVE) AND DIASTOLIC (CONGESTIVE) HEART FAILURE (HCC): Status: ACTIVE | Noted: 2023-06-11

## 2023-06-11 PROBLEM — J45.901 ASTHMA WITH ACUTE EXACERBATION: Status: ACTIVE | Noted: 2023-06-11

## 2023-06-12 PROBLEM — I50.43 ACUTE ON CHRONIC COMBINED SYSTOLIC AND DIASTOLIC CHF (CONGESTIVE HEART FAILURE) (HCC): Status: ACTIVE | Noted: 2023-06-12

## 2023-06-12 PROBLEM — G62.9 NEUROPATHY: Status: ACTIVE | Noted: 2023-06-12

## 2023-06-12 PROBLEM — Z51.5 PALLIATIVE CARE PATIENT: Status: ACTIVE | Noted: 2023-06-12

## 2023-06-12 PROBLEM — I42.8 NONISCHEMIC CARDIOMYOPATHY (HCC): Status: ACTIVE | Noted: 2023-06-12

## 2023-07-20 ENCOUNTER — OFFICE VISIT (OUTPATIENT)
Dept: CARDIOLOGY CLINIC | Age: 63
End: 2023-07-20

## 2023-07-20 ENCOUNTER — TELEPHONE (OUTPATIENT)
Dept: CARDIOLOGY CLINIC | Age: 63
End: 2023-07-20

## 2023-07-20 VITALS
SYSTOLIC BLOOD PRESSURE: 102 MMHG | DIASTOLIC BLOOD PRESSURE: 64 MMHG | HEIGHT: 66 IN | BODY MASS INDEX: 30.05 KG/M2 | WEIGHT: 187 LBS

## 2023-07-20 DIAGNOSIS — I50.42 CHRONIC COMBINED SYSTOLIC AND DIASTOLIC CHF, NYHA CLASS 3 (HCC): ICD-10-CM

## 2023-07-20 DIAGNOSIS — I42.8 NONISCHEMIC CARDIOMYOPATHY (HCC): ICD-10-CM

## 2023-07-20 DIAGNOSIS — I42.8 NONISCHEMIC CARDIOMYOPATHY (HCC): Primary | ICD-10-CM

## 2023-07-20 DIAGNOSIS — Z95.810 BIVENTRICULAR ICD (IMPLANTABLE CARDIOVERTER-DEFIBRILLATOR) IN PLACE: ICD-10-CM

## 2023-07-20 DIAGNOSIS — I44.7 LEFT BUNDLE BRANCH BLOCK: ICD-10-CM

## 2023-07-20 PROCEDURE — 93284 PRGRMG EVAL IMPLANTABLE DFB: CPT | Performed by: INTERNAL MEDICINE

## 2023-07-20 PROCEDURE — 99214 OFFICE O/P EST MOD 30 MIN: CPT | Performed by: INTERNAL MEDICINE

## 2023-07-20 RX ORDER — FUROSEMIDE 40 MG/1
40 TABLET ORAL DAILY
Qty: 90 TABLET | Refills: 3 | Status: CANCELLED | OUTPATIENT
Start: 2023-07-20 | End: 2024-07-14

## 2023-07-20 RX ORDER — CARVEDILOL 12.5 MG/1
12.5 TABLET ORAL 2 TIMES DAILY WITH MEALS
Qty: 180 TABLET | Refills: 3 | Status: CANCELLED | OUTPATIENT
Start: 2023-07-20

## 2023-07-20 RX ORDER — SPIRONOLACTONE 25 MG/1
25 TABLET ORAL DAILY
Qty: 90 TABLET | Refills: 3 | Status: SHIPPED | OUTPATIENT
Start: 2023-07-20

## 2023-07-20 RX ORDER — CARVEDILOL 12.5 MG/1
12.5 TABLET ORAL 2 TIMES DAILY WITH MEALS
Qty: 180 TABLET | Refills: 3 | Status: SHIPPED | OUTPATIENT
Start: 2023-07-20

## 2023-07-20 RX ORDER — FUROSEMIDE 40 MG/1
40 TABLET ORAL DAILY
Qty: 90 TABLET | Refills: 3 | Status: SHIPPED | OUTPATIENT
Start: 2023-07-20 | End: 2024-07-14

## 2023-07-20 RX ORDER — SPIRONOLACTONE 25 MG/1
25 TABLET ORAL DAILY
Qty: 90 TABLET | Refills: 3 | Status: CANCELLED | OUTPATIENT
Start: 2023-07-20

## 2023-07-20 ASSESSMENT — ENCOUNTER SYMPTOMS
DIARRHEA: 0
NAUSEA: 0
SHORTNESS OF BREATH: 0
VOMITING: 0
GASTROINTESTINAL NEGATIVE: 1
EYES NEGATIVE: 1
RESPIRATORY NEGATIVE: 1

## 2023-07-20 NOTE — TELEPHONE ENCOUNTER
Patient requested samples of Farxiga 10 mg at appointment today. 4 boxes were given.     LOT: [id number]   Exp: 7/31/25

## 2023-07-21 ENCOUNTER — TELEPHONE (OUTPATIENT)
Dept: CARDIOLOGY CLINIC | Age: 63
End: 2023-07-21

## 2023-07-21 NOTE — TELEPHONE ENCOUNTER
Pharm called stating script for coreg was sent with ins that pt went to [facility]. What dose do you want pt to take.

## 2023-07-24 DIAGNOSIS — I42.8 NONISCHEMIC CARDIOMYOPATHY (HCC): ICD-10-CM

## 2023-07-24 DIAGNOSIS — I50.42 CHRONIC COMBINED SYSTOLIC AND DIASTOLIC CHF, NYHA CLASS 3 (HCC): ICD-10-CM

## 2023-07-24 RX ORDER — CARVEDILOL 12.5 MG/1
TABLET ORAL
Qty: 180 TABLET | Refills: 3 | Status: SHIPPED | OUTPATIENT
Start: 2023-07-24

## 2023-09-06 ENCOUNTER — TELEPHONE (OUTPATIENT)
Dept: CARDIOLOGY CLINIC | Age: 63
End: 2023-09-06

## 2023-09-06 NOTE — TELEPHONE ENCOUNTER
Pt presented to clinic today to request samples of FARXIGA 10MG. Per DR. Sandra SOLIS Pt was given samples.      Lot #: [id number]  Exp Date: 01/2026  Quantity: 1   Lot #: [id number]  Exp Date: 09/2025  Quantity: 3

## 2023-09-13 DIAGNOSIS — I50.42 CHRONIC COMBINED SYSTOLIC AND DIASTOLIC CHF, NYHA CLASS 3 (HCC): ICD-10-CM

## 2023-09-13 DIAGNOSIS — I42.8 NONISCHEMIC CARDIOMYOPATHY (HCC): ICD-10-CM

## 2023-10-23 ENCOUNTER — OFFICE VISIT (OUTPATIENT)
Dept: CARDIOLOGY CLINIC | Age: 63
End: 2023-10-23

## 2023-10-23 VITALS
DIASTOLIC BLOOD PRESSURE: 70 MMHG | SYSTOLIC BLOOD PRESSURE: 112 MMHG | HEIGHT: 66 IN | WEIGHT: 190 LBS | HEART RATE: 80 BPM | BODY MASS INDEX: 30.53 KG/M2

## 2023-10-23 DIAGNOSIS — Z95.810 ICD (IMPLANTABLE CARDIOVERTER-DEFIBRILLATOR), BIVENTRICULAR, IN SITU: Primary | ICD-10-CM

## 2023-10-23 DIAGNOSIS — I50.42 CHRONIC COMBINED SYSTOLIC AND DIASTOLIC CHF, NYHA CLASS 3 (HCC): ICD-10-CM

## 2023-10-23 DIAGNOSIS — I44.7 LEFT BUNDLE BRANCH BLOCK: ICD-10-CM

## 2023-10-23 DIAGNOSIS — Z95.810 BIVENTRICULAR ICD (IMPLANTABLE CARDIOVERTER-DEFIBRILLATOR) IN PLACE: ICD-10-CM

## 2023-10-23 DIAGNOSIS — I42.8 NONISCHEMIC CARDIOMYOPATHY (HCC): ICD-10-CM

## 2023-10-23 PROCEDURE — 93284 PRGRMG EVAL IMPLANTABLE DFB: CPT | Performed by: INTERNAL MEDICINE

## 2023-10-23 PROCEDURE — 99213 OFFICE O/P EST LOW 20 MIN: CPT | Performed by: INTERNAL MEDICINE

## 2023-10-23 ASSESSMENT — ENCOUNTER SYMPTOMS
RESPIRATORY NEGATIVE: 1
EYES NEGATIVE: 1
SHORTNESS OF BREATH: 0
NAUSEA: 0
DIARRHEA: 0
VOMITING: 0
GASTROINTESTINAL NEGATIVE: 1

## 2023-10-23 NOTE — PROGRESS NOTES
ICD interrogation  Presenting rhythm: AS BV, AP <0.1%,  98.5%, effective 92.8%  Battery status: 5.8 years  Lead status: lead impedance within range and stable  Sensing: P waves 1.5 mV,  R waves >20 mV  Thresholds:  Atrial 0.5 V @ 0.4ms, ventricular 1.0 @ 0.4ms, LV see LV test result page  Observations:  1 monitored NSVT  Elevated LV thresholds LV1 to LV4  Vector express test ran and improved LV thresholds at LV4 to LV1  See scanned report for details  Reprogramming for sensing and thresholds  Next remote home check scheduled for 1/24/24

## 2023-11-13 ENCOUNTER — APPOINTMENT (OUTPATIENT)
Dept: GENERAL RADIOLOGY | Age: 63
End: 2023-11-13

## 2023-11-13 ENCOUNTER — HOSPITAL ENCOUNTER (INPATIENT)
Age: 63
LOS: 4 days | Discharge: HOME | End: 2023-11-17
Attending: PEDIATRICS

## 2023-11-13 DIAGNOSIS — J45.901 EXACERBATION OF ASTHMA, UNSPECIFIED ASTHMA SEVERITY, UNSPECIFIED WHETHER PERSISTENT: Primary | ICD-10-CM

## 2023-11-13 DIAGNOSIS — J45.40 MODERATE PERSISTENT ASTHMA WITHOUT COMPLICATION: ICD-10-CM

## 2023-11-13 LAB
ALBUMIN SERPL-MCNC: 4.3 G/DL (ref 3.5–5.2)
ALLENS TEST: ABNORMAL
ALP SERPL-CCNC: 120 U/L (ref 35–104)
ALT SERPL-CCNC: 24 U/L (ref 5–33)
ANION GAP SERPL CALCULATED.3IONS-SCNC: 13 MMOL/L (ref 7–19)
AST SERPL-CCNC: 24 U/L (ref 5–32)
BASE EXCESS ARTERIAL: 3.7 MMOL/L (ref -2–2)
BASOPHILS # BLD: 0 K/UL (ref 0–0.2)
BASOPHILS NFR BLD: 0 % (ref 0–1)
BILIRUB SERPL-MCNC: <0.2 MG/DL (ref 0.2–1.2)
BNP BLD-MCNC: 231 PG/ML (ref 0–124)
BUN SERPL-MCNC: 30 MG/DL (ref 8–23)
CALCIUM SERPL-MCNC: 9.4 MG/DL (ref 8.8–10.2)
CARBOXYHEMOGLOBIN ARTERIAL: 3.1 % (ref 0–5)
CHLORIDE SERPL-SCNC: 96 MMOL/L (ref 98–111)
CO2 SERPL-SCNC: 26 MMOL/L (ref 22–29)
CREAT SERPL-MCNC: 1 MG/DL (ref 0.5–0.9)
EKG P AXIS: NORMAL DEGREES
EKG P-R INTERVAL: NORMAL MS
EKG Q-T INTERVAL: 404 MS
EKG QRS DURATION: 92 MS
EKG QTC CALCULATION (BAZETT): 460 MS
EKG T AXIS: 18 DEGREES
EOSINOPHIL # BLD: 0.46 K/UL (ref 0–0.6)
EOSINOPHIL NFR BLD: 3 % (ref 0–5)
ERYTHROCYTE [DISTWIDTH] IN BLOOD BY AUTOMATED COUNT: 12.4 % (ref 11.5–14.5)
GLUCOSE SERPL-MCNC: 112 MG/DL (ref 74–109)
HCO3 ARTERIAL: 27.7 MMOL/L (ref 22–26)
HCT VFR BLD AUTO: 43.1 % (ref 37–47)
HEMOGLOBIN, ART, EXTENDED: 15.1 G/DL (ref 12–16)
HGB BLD-MCNC: 14.5 G/DL (ref 12–16)
IMM GRANULOCYTES # BLD: 0 K/UL
LYMPHOCYTES # BLD: 7.8 K/UL (ref 1.1–4.5)
LYMPHOCYTES NFR BLD: 48 % (ref 20–40)
MACROCYTES BLD QL SMEAR: ABNORMAL
MCH RBC QN AUTO: 34.7 PG (ref 27–31)
MCHC RBC AUTO-ENTMCNC: 33.6 G/DL (ref 33–37)
MCV RBC AUTO: 103.1 FL (ref 81–99)
METHEMOGLOBIN ARTERIAL: 1.2 %
MONOCYTES # BLD: 0.8 K/UL (ref 0–0.9)
MONOCYTES NFR BLD: 5 % (ref 0–10)
NEUTROPHILS # BLD: 6.3 K/UL (ref 1.5–7.5)
NEUTS SEG NFR BLD: 41 % (ref 50–65)
O2 CONTENT ARTERIAL: 19.7 ML/DL
O2 SAT, ARTERIAL: 92.9 %
O2 THERAPY: ABNORMAL
PCO2 ARTERIAL: 39 MMHG (ref 35–45)
PH ARTERIAL: 7.46 (ref 7.35–7.45)
PLATELET # BLD AUTO: 255 K/UL (ref 130–400)
PLATELET SLIDE REVIEW: ADEQUATE
PMV BLD AUTO: 9.9 FL (ref 9.4–12.3)
PO2 ARTERIAL: 77 MMHG (ref 80–100)
POTASSIUM BLD-SCNC: 4.4 MMOL/L
POTASSIUM SERPL-SCNC: 4.8 MMOL/L (ref 3.5–5)
PROCALCITONIN: 0.04 NG/ML (ref 0–0.09)
PROT SERPL-MCNC: 7.4 G/DL (ref 6.6–8.7)
RBC # BLD AUTO: 4.18 M/UL (ref 4.2–5.4)
SAMPLE SOURCE: ABNORMAL
SODIUM SERPL-SCNC: 135 MMOL/L (ref 136–145)
TROPONIN, HIGH SENSITIVITY: 9 NG/L (ref 0–14)
VARIANT LYMPHS NFR BLD: 3 % (ref 0–8)
WBC # BLD AUTO: 15.3 K/UL (ref 4.8–10.8)

## 2023-11-13 PROCEDURE — 93010 ELECTROCARDIOGRAM REPORT: CPT | Performed by: INTERNAL MEDICINE

## 2023-11-13 PROCEDURE — 6370000000 HC RX 637 (ALT 250 FOR IP): Performed by: HOSPITALIST

## 2023-11-13 PROCEDURE — 99223 1ST HOSP IP/OBS HIGH 75: CPT | Performed by: INTERNAL MEDICINE

## 2023-11-13 PROCEDURE — 6360000002 HC RX W HCPCS: Performed by: PEDIATRICS

## 2023-11-13 PROCEDURE — 84145 PROCALCITONIN (PCT): CPT

## 2023-11-13 PROCEDURE — 71045 X-RAY EXAM CHEST 1 VIEW: CPT

## 2023-11-13 PROCEDURE — 96365 THER/PROPH/DIAG IV INF INIT: CPT

## 2023-11-13 PROCEDURE — 94640 AIRWAY INHALATION TREATMENT: CPT

## 2023-11-13 PROCEDURE — 96375 TX/PRO/DX INJ NEW DRUG ADDON: CPT

## 2023-11-13 PROCEDURE — 94150 VITAL CAPACITY TEST: CPT

## 2023-11-13 PROCEDURE — 85025 COMPLETE CBC W/AUTO DIFF WBC: CPT

## 2023-11-13 PROCEDURE — 6360000002 HC RX W HCPCS: Performed by: HOSPITALIST

## 2023-11-13 PROCEDURE — 6370000000 HC RX 637 (ALT 250 FOR IP): Performed by: PEDIATRICS

## 2023-11-13 PROCEDURE — 2580000003 HC RX 258: Performed by: PEDIATRICS

## 2023-11-13 PROCEDURE — 83880 ASSAY OF NATRIURETIC PEPTIDE: CPT

## 2023-11-13 PROCEDURE — 82803 BLOOD GASES ANY COMBINATION: CPT

## 2023-11-13 PROCEDURE — 84484 ASSAY OF TROPONIN QUANT: CPT

## 2023-11-13 PROCEDURE — 80053 COMPREHEN METABOLIC PANEL: CPT

## 2023-11-13 PROCEDURE — 2580000003 HC RX 258: Performed by: HOSPITALIST

## 2023-11-13 PROCEDURE — 1210000000 HC MED SURG R&B

## 2023-11-13 PROCEDURE — 99285 EMERGENCY DEPT VISIT HI MDM: CPT

## 2023-11-13 PROCEDURE — 93005 ELECTROCARDIOGRAM TRACING: CPT | Performed by: PEDIATRICS

## 2023-11-13 PROCEDURE — 94760 N-INVAS EAR/PLS OXIMETRY 1: CPT

## 2023-11-13 PROCEDURE — 36600 WITHDRAWAL OF ARTERIAL BLOOD: CPT

## 2023-11-13 PROCEDURE — 36415 COLL VENOUS BLD VENIPUNCTURE: CPT

## 2023-11-13 RX ORDER — ACETAMINOPHEN 325 MG/1
650 TABLET ORAL EVERY 4 HOURS PRN
Status: DISCONTINUED | OUTPATIENT
Start: 2023-11-13 | End: 2023-11-17 | Stop reason: HOSPADM

## 2023-11-13 RX ORDER — SODIUM CHLORIDE 0.9 % (FLUSH) 0.9 %
5-40 SYRINGE (ML) INJECTION PRN
Status: DISCONTINUED | OUTPATIENT
Start: 2023-11-13 | End: 2023-11-17 | Stop reason: HOSPADM

## 2023-11-13 RX ORDER — ALBUTEROL SULFATE 2.5 MG/3ML
10 SOLUTION RESPIRATORY (INHALATION) CONTINUOUS
Status: DISPENSED | OUTPATIENT
Start: 2023-11-13 | End: 2023-11-13

## 2023-11-13 RX ORDER — MECOBALAMIN 5000 MCG
5 TABLET,DISINTEGRATING ORAL NIGHTLY PRN
Status: DISCONTINUED | OUTPATIENT
Start: 2023-11-13 | End: 2023-11-17 | Stop reason: HOSPADM

## 2023-11-13 RX ORDER — GUAIFENESIN/DEXTROMETHORPHAN 100-10MG/5
10 SYRUP ORAL ONCE
Status: COMPLETED | OUTPATIENT
Start: 2023-11-13 | End: 2023-11-13

## 2023-11-13 RX ORDER — CARVEDILOL 12.5 MG/1
12.5 TABLET ORAL 2 TIMES DAILY WITH MEALS
Status: DISCONTINUED | OUTPATIENT
Start: 2023-11-13 | End: 2023-11-17 | Stop reason: HOSPADM

## 2023-11-13 RX ORDER — IPRATROPIUM BROMIDE AND ALBUTEROL SULFATE 2.5; .5 MG/3ML; MG/3ML
1 SOLUTION RESPIRATORY (INHALATION) ONCE
Status: COMPLETED | OUTPATIENT
Start: 2023-11-13 | End: 2023-11-13

## 2023-11-13 RX ORDER — FUROSEMIDE 40 MG/1
40 TABLET ORAL DAILY
Status: DISCONTINUED | OUTPATIENT
Start: 2023-11-13 | End: 2023-11-17 | Stop reason: HOSPADM

## 2023-11-13 RX ORDER — ENOXAPARIN SODIUM 100 MG/ML
40 INJECTION SUBCUTANEOUS DAILY
Status: DISCONTINUED | OUTPATIENT
Start: 2023-11-13 | End: 2023-11-17 | Stop reason: HOSPADM

## 2023-11-13 RX ORDER — 0.9 % SODIUM CHLORIDE 0.9 %
250 INTRAVENOUS SOLUTION INTRAVENOUS ONCE
Status: COMPLETED | OUTPATIENT
Start: 2023-11-13 | End: 2023-11-13

## 2023-11-13 RX ORDER — SPIRONOLACTONE 25 MG/1
25 TABLET ORAL DAILY
Status: DISCONTINUED | OUTPATIENT
Start: 2023-11-13 | End: 2023-11-17 | Stop reason: HOSPADM

## 2023-11-13 RX ORDER — ALBUTEROL SULFATE 2.5 MG/3ML
2.5 SOLUTION RESPIRATORY (INHALATION) EVERY 4 HOURS PRN
Status: DISCONTINUED | OUTPATIENT
Start: 2023-11-13 | End: 2023-11-15

## 2023-11-13 RX ORDER — SODIUM CHLORIDE 9 MG/ML
INJECTION, SOLUTION INTRAVENOUS PRN
Status: DISCONTINUED | OUTPATIENT
Start: 2023-11-13 | End: 2023-11-17 | Stop reason: HOSPADM

## 2023-11-13 RX ORDER — SODIUM CHLORIDE 0.9 % (FLUSH) 0.9 %
5-40 SYRINGE (ML) INJECTION EVERY 12 HOURS SCHEDULED
Status: DISCONTINUED | OUTPATIENT
Start: 2023-11-13 | End: 2023-11-17 | Stop reason: HOSPADM

## 2023-11-13 RX ORDER — ONDANSETRON 4 MG/1
4 TABLET, ORALLY DISINTEGRATING ORAL EVERY 8 HOURS PRN
Status: DISCONTINUED | OUTPATIENT
Start: 2023-11-13 | End: 2023-11-17 | Stop reason: HOSPADM

## 2023-11-13 RX ORDER — POLYETHYLENE GLYCOL 3350 17 G/17G
17 POWDER, FOR SOLUTION ORAL DAILY PRN
Status: DISCONTINUED | OUTPATIENT
Start: 2023-11-13 | End: 2023-11-17 | Stop reason: HOSPADM

## 2023-11-13 RX ORDER — FAMOTIDINE 20 MG/1
20 TABLET, FILM COATED ORAL 2 TIMES DAILY
Status: DISCONTINUED | OUTPATIENT
Start: 2023-11-13 | End: 2023-11-17 | Stop reason: HOSPADM

## 2023-11-13 RX ORDER — MAGNESIUM SULFATE IN WATER 40 MG/ML
2000 INJECTION, SOLUTION INTRAVENOUS PRN
Status: DISCONTINUED | OUTPATIENT
Start: 2023-11-13 | End: 2023-11-17 | Stop reason: HOSPADM

## 2023-11-13 RX ORDER — ARFORMOTEROL TARTRATE 15 UG/2ML
15 SOLUTION RESPIRATORY (INHALATION)
Status: DISCONTINUED | OUTPATIENT
Start: 2023-11-13 | End: 2023-11-17 | Stop reason: HOSPADM

## 2023-11-13 RX ORDER — GUAIFENESIN/DEXTROMETHORPHAN 100-10MG/5
5 SYRUP ORAL EVERY 6 HOURS
Status: DISCONTINUED | OUTPATIENT
Start: 2023-11-13 | End: 2023-11-17 | Stop reason: HOSPADM

## 2023-11-13 RX ORDER — ONDANSETRON 2 MG/ML
4 INJECTION INTRAMUSCULAR; INTRAVENOUS EVERY 6 HOURS PRN
Status: DISCONTINUED | OUTPATIENT
Start: 2023-11-13 | End: 2023-11-17 | Stop reason: HOSPADM

## 2023-11-13 RX ORDER — CALCIUM CARBONATE 500 MG/1
500 TABLET, CHEWABLE ORAL 3 TIMES DAILY PRN
Status: DISCONTINUED | OUTPATIENT
Start: 2023-11-13 | End: 2023-11-17 | Stop reason: HOSPADM

## 2023-11-13 RX ORDER — BUDESONIDE 0.5 MG/2ML
0.5 INHALANT ORAL EVERY 12 HOURS
Status: DISCONTINUED | OUTPATIENT
Start: 2023-11-13 | End: 2023-11-17 | Stop reason: HOSPADM

## 2023-11-13 RX ORDER — POTASSIUM CHLORIDE 20 MEQ/1
40 TABLET, EXTENDED RELEASE ORAL PRN
Status: DISCONTINUED | OUTPATIENT
Start: 2023-11-13 | End: 2023-11-17 | Stop reason: HOSPADM

## 2023-11-13 RX ORDER — MAGNESIUM SULFATE IN WATER 40 MG/ML
4000 INJECTION, SOLUTION INTRAVENOUS ONCE
Status: COMPLETED | OUTPATIENT
Start: 2023-11-13 | End: 2023-11-13

## 2023-11-13 RX ORDER — POTASSIUM CHLORIDE 7.45 MG/ML
10 INJECTION INTRAVENOUS PRN
Status: DISCONTINUED | OUTPATIENT
Start: 2023-11-13 | End: 2023-11-17 | Stop reason: HOSPADM

## 2023-11-13 RX ADMIN — IPRATROPIUM BROMIDE 0.5 MG: 0.5 SOLUTION RESPIRATORY (INHALATION) at 10:02

## 2023-11-13 RX ADMIN — ARFORMOTEROL TARTRATE 15 MCG: 15 SOLUTION RESPIRATORY (INHALATION) at 18:25

## 2023-11-13 RX ADMIN — ALBUTEROL SULFATE 10 MG: 2.5 SOLUTION RESPIRATORY (INHALATION) at 05:30

## 2023-11-13 RX ADMIN — ARFORMOTEROL TARTRATE 15 MCG: 15 SOLUTION RESPIRATORY (INHALATION) at 10:03

## 2023-11-13 RX ADMIN — ENOXAPARIN SODIUM 40 MG: 100 INJECTION SUBCUTANEOUS at 12:08

## 2023-11-13 RX ADMIN — METHYLPREDNISOLONE SODIUM SUCCINATE 40 MG: 40 INJECTION, POWDER, FOR SOLUTION INTRAMUSCULAR; INTRAVENOUS at 17:28

## 2023-11-13 RX ADMIN — IPRATROPIUM BROMIDE 0.5 MG: 0.5 SOLUTION RESPIRATORY (INHALATION) at 14:28

## 2023-11-13 RX ADMIN — CARVEDILOL 12.5 MG: 12.5 TABLET, FILM COATED ORAL at 17:28

## 2023-11-13 RX ADMIN — EMPAGLIFLOZIN 10 MG: 10 TABLET, FILM COATED ORAL at 14:45

## 2023-11-13 RX ADMIN — IPRATROPIUM BROMIDE AND ALBUTEROL SULFATE 1 DOSE: .5; 2.5 SOLUTION RESPIRATORY (INHALATION) at 03:59

## 2023-11-13 RX ADMIN — SODIUM CHLORIDE, PRESERVATIVE FREE 10 ML: 5 INJECTION INTRAVENOUS at 12:12

## 2023-11-13 RX ADMIN — SPIRONOLACTONE 25 MG: 25 TABLET ORAL at 12:09

## 2023-11-13 RX ADMIN — FAMOTIDINE 20 MG: 20 TABLET ORAL at 12:08

## 2023-11-13 RX ADMIN — FUROSEMIDE 40 MG: 40 TABLET ORAL at 12:09

## 2023-11-13 RX ADMIN — AZITHROMYCIN MONOHYDRATE 500 MG: 500 INJECTION, POWDER, LYOPHILIZED, FOR SOLUTION INTRAVENOUS at 12:07

## 2023-11-13 RX ADMIN — IPRATROPIUM BROMIDE 0.5 MG: 0.5 SOLUTION RESPIRATORY (INHALATION) at 18:24

## 2023-11-13 RX ADMIN — BUDESONIDE 500 MCG: 0.5 INHALANT ORAL at 10:02

## 2023-11-13 RX ADMIN — FAMOTIDINE 20 MG: 20 TABLET ORAL at 21:13

## 2023-11-13 RX ADMIN — GUAIFENESIN SYRUP AND DEXTROMETHORPHAN 10 ML: 100; 10 SYRUP ORAL at 04:32

## 2023-11-13 RX ADMIN — SODIUM CHLORIDE 250 ML: 9 INJECTION, SOLUTION INTRAVENOUS at 05:55

## 2023-11-13 RX ADMIN — SACUBITRIL AND VALSARTAN 1 TABLET: 49; 51 TABLET, FILM COATED ORAL at 21:13

## 2023-11-13 RX ADMIN — SODIUM CHLORIDE, PRESERVATIVE FREE 10 ML: 5 INJECTION INTRAVENOUS at 21:14

## 2023-11-13 RX ADMIN — MAGNESIUM SULFATE IN WATER 4000 MG: 4 INJECTION, SOLUTION INTRAVENOUS at 05:28

## 2023-11-13 RX ADMIN — GUAIFENESIN SYRUP AND DEXTROMETHORPHAN 5 ML: 100; 10 SYRUP ORAL at 14:45

## 2023-11-13 RX ADMIN — GUAIFENESIN SYRUP AND DEXTROMETHORPHAN 5 ML: 100; 10 SYRUP ORAL at 21:13

## 2023-11-13 RX ADMIN — CARVEDILOL 12.5 MG: 12.5 TABLET, FILM COATED ORAL at 12:09

## 2023-11-13 RX ADMIN — BUDESONIDE 500 MCG: 0.5 INHALANT ORAL at 18:24

## 2023-11-13 RX ADMIN — METHYLPREDNISOLONE SODIUM SUCCINATE 125 MG: 125 INJECTION, POWDER, FOR SOLUTION INTRAMUSCULAR; INTRAVENOUS at 04:33

## 2023-11-13 ASSESSMENT — ENCOUNTER SYMPTOMS
BLOOD IN STOOL: 0
NAUSEA: 0
CHEST TIGHTNESS: 1
ABDOMINAL PAIN: 0
RHINORRHEA: 0
VOMITING: 0
CHOKING: 0
BACK PAIN: 0
COLOR CHANGE: 0
COUGH: 1
RHINORRHEA: 1
WHEEZING: 1
SHORTNESS OF BREATH: 1

## 2023-11-13 NOTE — PROGRESS NOTES
4 Eyes Skin Assessment     NAME:  Genita Brittle  YOB: 1960  MEDICAL RECORD NUMBER:  [id number]    The patient is being assessed for  Admission    I agree that at least one RN has performed a thorough Head to Toe Skin Assessment on the patient. ALL assessment sites listed below have been assessed. Areas assessed by both nurses:    Head, Face, Ears, Shoulders, Back, Chest, Arms, Elbows, Hands, Sacrum. Buttock, Coccyx, Ischium, and Legs. Feet and Heels        Does the Patient have a Wound?  No noted wound(s)       Tanvir Prevention initiated by RN: No  Wound Care Orders initiated by RN: No    Pressure Injury (Stage 3,4, Unstageable, DTI, NWPT, and Complex wounds) if present, place Wound referral order by RN under : No    New Ostomies, if present place, Ostomy referral order under : No     Nurse 1 eSignature: Electronically signed by Natalie Mcadams RN on 11/13/23 at 12:21 PM CST    **SHARE this note so that the co-signing nurse can place an eSignature**    Nurse 2 eSignature: Electronically signed by Mike Keys LPN on 05/96/60 at 8:01 PM CST

## 2023-11-13 NOTE — H&P
[facility] History and Physical    Patient Information:  Patient: See Adame  MRN: [id number]   Acct: [de-identified]  YOB: 1960  Admit Date: 11/13/2023      Date of Service: 11/13/2023  Primary Care Physician: Simón Posey MD  Advance Directive: Prior  Health Care Proxy: her , Mr. Ugo Freeman, [phone]         SUBJECTIVE:    Chief Complaint   Patient presents with    Shortness of Breath     States that she fees like her asthma has flared and that she feels like she is retaining fluid; states that she took an extra half of lasix yesterday     EP Sign Out:  Asthma exacerbation, looks bad s/p magnesium and solumedrol and updraft    HPI:  Mrs. See Adame is a pleasant 66year old lady from home. She states that she has smoked 3/4 PPD omn avrg from age 14 years, she is now at 1/2 PPD. She states that she was diagnosed with asthma at age 30 years after having smoked 3/4 PPD for 19 years. She has not seen a lung doctor since long before moving from [place] to [place]. She states that she has felt bad for the last 5 days, she thought that she was getting a cold at that time. She states that it was just a tickle in her throat top start. She has had the dyspnea since she woke up at 2:30am on Friday. After the Magnesium and Stroids and Updraft int he ED she states that she feels so much better. Review of Systems:   Review of Systems   Constitutional:  Positive for fatigue (chronic and unchanged, feels better now). Negative for chills, diaphoresis and fever. HENT:  Positive for congestion (now resolved), rhinorrhea (now resolved) and sneezing. Respiratory:  Positive for cough, chest tightness, shortness of breath and wheezing. Negative for choking. Cardiovascular:  Positive for chest pain (rib pain ONLY with coughing paroxysms). Negative for palpitations and leg swelling. Gastrointestinal:  Negative for nausea and vomiting.    Neurological: Negative for syncope, weakness and light-headedness.      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    Asthma     states diagnosed in [id number], doesn't know what kind of doc diagnosed this    CHF (congestive heart failure) ([address])     Heart failure ([address])     reduced ejection fx    Palliative care patient 2023    Tobacco abuse     Upper respiratory infection      Past Psychiatric History:  Denied any    Past Surgical History:   Procedure Laterality Date    ANKLE SURGERY Left     ptstates she was 18, and spring    BREAST ENHANCEMENT SURGERY Bilateral     CARDIAC CATHETERIZATION N/A 2022    CARDIAC DEFIBRILLATOR PLACEMENT N/A 08/10/2022     SECTION N/A 07/15/1990     SECTION N/A 1994    REVERSE TOTAL SHOULDER ARTHROPLASTY Right 2006    pt states with bone graft    SHOULDER SURGERY Right 2005    SHOULDER SURGERY Right 2006    SHOULDER SURGERY Right 2005     Social History       Tobacco History       Smoking Status  Every Day Smoking Start Date   Smoking Frequency  0.75 packs/day for 45.00 years (33.75 ttl pk-yrs) Smoking Tobacco Type  Cigarettes since       Smokeless Tobacco Use  Never      Tobacco Comments  Started at age 14, did not smoke during her 4 pregnancies, has always averaged 3/4 PPD, NOW is at 1/2 PPD              Alcohol History       Alcohol Use Status  Yes Comment  has 4 beers per year              Drug Use       Drug Use Status  Never              Sexual Activity       Sexually Active  Defer Comment  has 5 kids, two were twins             CODE STATUS: Full Code (may do CPR, may intubate)  HEALTH CARE PROXY: her , Mr. Manuel Canada, [phone]  AMBULATES: independently  DOMICILED: has 1 step to enter her home, has stairs to basement but does not use them, all the kids live in [place], lives with her second  Anaila Leong in [place]     Family History

## 2023-11-13 NOTE — CONSULTS
Pulmonary and Critical Care Consult Note    [address] Chepe Garcia    MRN# [id number]    Acct# [de-identified]  11/13/2023   12:45 PM CST    Referring Chris Strickland MD      Chief Complaint: Shortness of breath    Requesting physician: Dr. Da Christensen    Reason for consult: Asthma exacerbation      HPI: We have been consulted to see this 66y.o. year old female born on 1960. The patient presents to the hospital due to acute severe shortness of breath. She is known to have history of asthma, she has a history of congestive heart failure and cardiac arrhythmia status post AICD. She continues to smoke. She says that her asthma is usually controlled with as needed use of albuterol which she rarely uses other than during her seasonal flares. She says that she got short of breath a few days prior to presentation to the hospital.  She continues to get worse at home until it became extremely difficult for her to walk only a few steps. She continues to smoke about half a pack a day. No pulmonary function study on file. In the emergency room she was severely short of breath and bronchospastic. She received 5 nebulized bronchodilators back-to-back she continued to be symptomatic. She was given magnesium and IV Solu-Medrol. She felt improved but required admission. She was started on IV steroids and antibiotics. She was also started on nebulized bronchodilators.       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    Asthma     states diagnosed in [id number], doesn't know what kind of doc diagnosed this    CHF (congestive heart failure) ([address])     Heart failure ([address])     reduced ejection fx    Palliative care patient 06/12/2023    Tobacco abuse     Upper respiratory infection SurgicalHistory  Past Surgical History:   Procedure Laterality Date    ANKLE SURGERY Left     ptstates she was 18, and spring    BREAST ENHANCEMENT SURGERY Bilateral     CARDIAC CATHETERIZATION N/A 2022    CARDIAC DEFIBRILLATOR PLACEMENT N/A 08/10/2022     SECTION N/A 07/15/1990     SECTION N/A 1994    REVERSE TOTAL SHOULDER ARTHROPLASTY Right 2006    pt states with bone graft    SHOULDER SURGERY Right 2005    SHOULDER SURGERY Right 2006    SHOULDER SURGERY Right 2005     Allergies  Allergies   Allergen Reactions    Nickel Dermatitis and Rash     Gets vesicular rash    Fish Allergy Nausea And Vomiting     SCALLOPS ONLY - tolerates lobster and shrimp and crabs      Morphine Nausea And Vomiting         The following medications were reviewed and adjustments are made when necessary:    arformoterol tartrate, 15 mcg, Nebulization, BID RT    budesonide, 0.5 mg, Nebulization, Q12H    ipratropium, 0.5 mg, Nebulization, 4x Daily RT    famotidine, 20 mg, Oral, BID    methylPREDNISolone, 40 mg, IntraVENous, BID AC    [START ON 2023] methylPREDNISolone, 40 mg, IntraVENous, Daily    azithromycin, 500 mg, IntraVENous, Q24H    empagliflozin, 10 mg, Oral, Daily    carvedilol, 12.5 mg, Oral, BID WC    furosemide, 40 mg, Oral, Daily    spironolactone, 25 mg, Oral, Daily    sacubitril-valsartan, 1 tablet, Oral, BID    sodium chloride flush, 5-40 mL, IntraVENous, 2 times per day    enoxaparin, 40 mg, SubCUTAneous, Daily   Social History   reports that she has been smoking cigarettes. She started smoking about 45 years ago. She has a 33.75 pack-year smoking history. She has never used smokeless tobacco. She reports current alcohol use. She reports that she does not use drugs.   Family History  family history includes Cerebral Aneurysm (age of onset: 40) in her father; High Blood Pressure in her father; No Known Problems in her brother, brother, daughter, daughter, daughter,

## 2023-11-13 NOTE — ED PROVIDER NOTES
[facility] EMERGENCY DEPT  eMERGENCY dEPARTMENT eNCOUnter      Pt Name: Elma Lopez  MRN: [id number]  [address] [place] 1960  Date of evaluation: 11/13/2023  Provider: Clarke Hull MD    CHIEF COMPLAINT       Chief Complaint   Patient presents with    Shortness of Breath     States that she fees like her asthma has flared and that she feels like she is retaining fluid; states that she took an extra half of lasix yesterday         HISTORY OF PRESENT ILLNESS   (Location/Symptom, Timing/Onset,Context/Setting, Quality, Duration, Modifying Factors, Severity)  Note limiting factors. Elma Lopez is a 66 y.o. female who presents to the emergency department with shortness of breath. Patient states that she became short of breath on Friday morning at 2:30 AM (3 days ago). Patient states that she has been having a nonproductive cough. Patient has been using her albuterol every 4 hours with the last treatment at midnight. Patient took an extra Lasix dose yesterday. Associated symptoms include sneezing and runny nose. Patient denies fever, lower extremity swelling or pain. Patient states that standing makes the shortness of breath worse. Laying down eases shortness of breath. HPI    NursingNotes were reviewed. REVIEW OF SYSTEMS    (2-9 systems for level 4, 10 or more for level 5)     Review of Systems   Constitutional:  Negative for chills and fever. HENT:  Negative for congestion and rhinorrhea. Respiratory:  Positive for cough, chest tightness, shortness of breath and wheezing. Cardiovascular:  Negative for chest pain, palpitations and leg swelling. Gastrointestinal:  Negative for abdominal pain, blood in stool, nausea and vomiting. Genitourinary:  Negative for difficulty urinating and dysuria. Musculoskeletal:  Negative for back pain and neck pain. Skin:  Negative for color change and pallor. Neurological:  Negative for syncope and light-headedness.    Psychiatric/Behavioral:  Negative for

## 2023-11-13 NOTE — PLAN OF CARE
Problem: Safety - Adult  Goal: Free from fall injury  Outcome: Progressing     Problem: ABCDS Injury Assessment  Goal: Absence of physical injury  Outcome: Progressing     Problem: Respiratory - Adult  Goal: Achieves optimal ventilation and oxygenation  Outcome: Progressing

## 2023-11-14 DIAGNOSIS — J45.40 MODERATE PERSISTENT ASTHMA WITHOUT COMPLICATION: Primary | ICD-10-CM

## 2023-11-14 LAB
ANION GAP SERPL CALCULATED.3IONS-SCNC: 14 MMOL/L (ref 7–19)
BASOPHILS # BLD: 0 K/UL (ref 0–0.2)
BASOPHILS NFR BLD: 0.1 % (ref 0–1)
BUN SERPL-MCNC: 28 MG/DL (ref 8–23)
CALCIUM SERPL-MCNC: 9.2 MG/DL (ref 8.8–10.2)
CHLORIDE SERPL-SCNC: 100 MMOL/L (ref 98–111)
CO2 SERPL-SCNC: 22 MMOL/L (ref 22–29)
CREAT SERPL-MCNC: 1.2 MG/DL (ref 0.5–0.9)
EOSINOPHIL # BLD: 0 K/UL (ref 0–0.6)
EOSINOPHIL NFR BLD: 0 % (ref 0–5)
ERYTHROCYTE [DISTWIDTH] IN BLOOD BY AUTOMATED COUNT: 12.6 % (ref 11.5–14.5)
GLUCOSE SERPL-MCNC: 148 MG/DL (ref 74–109)
HCT VFR BLD AUTO: 39.9 % (ref 37–47)
HGB BLD-MCNC: 13.4 G/DL (ref 12–16)
IMM GRANULOCYTES # BLD: 0.1 K/UL
LYMPHOCYTES # BLD: 2.2 K/UL (ref 1.1–4.5)
LYMPHOCYTES NFR BLD: 15.1 % (ref 20–40)
MCH RBC QN AUTO: 35 PG (ref 27–31)
MCHC RBC AUTO-ENTMCNC: 33.6 G/DL (ref 33–37)
MCV RBC AUTO: 104.2 FL (ref 81–99)
MONOCYTES # BLD: 0.5 K/UL (ref 0–0.9)
MONOCYTES NFR BLD: 3.7 % (ref 0–10)
NEUTROPHILS # BLD: 11.8 K/UL (ref 1.5–7.5)
NEUTS SEG NFR BLD: 80.6 % (ref 50–65)
PLATELET # BLD AUTO: 246 K/UL (ref 130–400)
PMV BLD AUTO: 9.9 FL (ref 9.4–12.3)
POTASSIUM SERPL-SCNC: 4.4 MMOL/L (ref 3.5–5)
RBC # BLD AUTO: 3.83 M/UL (ref 4.2–5.4)
SODIUM SERPL-SCNC: 136 MMOL/L (ref 136–145)
WBC # BLD AUTO: 14.7 K/UL (ref 4.8–10.8)

## 2023-11-14 PROCEDURE — 6370000000 HC RX 637 (ALT 250 FOR IP): Performed by: HOSPITALIST

## 2023-11-14 PROCEDURE — 36415 COLL VENOUS BLD VENIPUNCTURE: CPT

## 2023-11-14 PROCEDURE — 94618 PULMONARY STRESS TESTING: CPT

## 2023-11-14 PROCEDURE — 80048 BASIC METABOLIC PNL TOTAL CA: CPT

## 2023-11-14 PROCEDURE — 6360000002 HC RX W HCPCS: Performed by: HOSPITALIST

## 2023-11-14 PROCEDURE — 94760 N-INVAS EAR/PLS OXIMETRY 1: CPT

## 2023-11-14 PROCEDURE — 94640 AIRWAY INHALATION TREATMENT: CPT

## 2023-11-14 PROCEDURE — 2580000003 HC RX 258: Performed by: HOSPITALIST

## 2023-11-14 PROCEDURE — 85025 COMPLETE CBC W/AUTO DIFF WBC: CPT

## 2023-11-14 PROCEDURE — 99233 SBSQ HOSP IP/OBS HIGH 50: CPT | Performed by: INTERNAL MEDICINE

## 2023-11-14 PROCEDURE — 94761 N-INVAS EAR/PLS OXIMETRY MLT: CPT

## 2023-11-14 PROCEDURE — 1210000000 HC MED SURG R&B

## 2023-11-14 PROCEDURE — 6370000000 HC RX 637 (ALT 250 FOR IP)

## 2023-11-14 RX ORDER — PANTOPRAZOLE SODIUM 40 MG/1
40 TABLET, DELAYED RELEASE ORAL
Status: DISCONTINUED | OUTPATIENT
Start: 2023-11-15 | End: 2023-11-17 | Stop reason: HOSPADM

## 2023-11-14 RX ORDER — PREDNISONE 10 MG/1
40 TABLET ORAL DAILY
Status: DISCONTINUED | OUTPATIENT
Start: 2023-11-14 | End: 2023-11-17 | Stop reason: HOSPADM

## 2023-11-14 RX ADMIN — BUDESONIDE 500 MCG: 0.5 INHALANT ORAL at 06:17

## 2023-11-14 RX ADMIN — ARFORMOTEROL TARTRATE 15 MCG: 15 SOLUTION RESPIRATORY (INHALATION) at 06:17

## 2023-11-14 RX ADMIN — FAMOTIDINE 20 MG: 20 TABLET ORAL at 21:25

## 2023-11-14 RX ADMIN — GUAIFENESIN SYRUP AND DEXTROMETHORPHAN 5 ML: 100; 10 SYRUP ORAL at 07:56

## 2023-11-14 RX ADMIN — GUAIFENESIN SYRUP AND DEXTROMETHORPHAN 5 ML: 100; 10 SYRUP ORAL at 18:11

## 2023-11-14 RX ADMIN — AZITHROMYCIN MONOHYDRATE 500 MG: 500 INJECTION, POWDER, LYOPHILIZED, FOR SOLUTION INTRAVENOUS at 12:15

## 2023-11-14 RX ADMIN — EMPAGLIFLOZIN 10 MG: 10 TABLET, FILM COATED ORAL at 09:56

## 2023-11-14 RX ADMIN — METHYLPREDNISOLONE SODIUM SUCCINATE 40 MG: 40 INJECTION, POWDER, FOR SOLUTION INTRAMUSCULAR; INTRAVENOUS at 05:09

## 2023-11-14 RX ADMIN — FAMOTIDINE 20 MG: 20 TABLET ORAL at 08:10

## 2023-11-14 RX ADMIN — GUAIFENESIN SYRUP AND DEXTROMETHORPHAN 5 ML: 100; 10 SYRUP ORAL at 12:14

## 2023-11-14 RX ADMIN — ARFORMOTEROL TARTRATE 15 MCG: 15 SOLUTION RESPIRATORY (INHALATION) at 18:21

## 2023-11-14 RX ADMIN — GUAIFENESIN SYRUP AND DEXTROMETHORPHAN 5 ML: 100; 10 SYRUP ORAL at 01:14

## 2023-11-14 RX ADMIN — SODIUM CHLORIDE, PRESERVATIVE FREE 10 ML: 5 INJECTION INTRAVENOUS at 09:55

## 2023-11-14 RX ADMIN — IPRATROPIUM BROMIDE 0.5 MG: 0.5 SOLUTION RESPIRATORY (INHALATION) at 10:29

## 2023-11-14 RX ADMIN — BUDESONIDE 500 MCG: 0.5 INHALANT ORAL at 18:21

## 2023-11-14 RX ADMIN — IPRATROPIUM BROMIDE 0.5 MG: 0.5 SOLUTION RESPIRATORY (INHALATION) at 14:09

## 2023-11-14 RX ADMIN — IPRATROPIUM BROMIDE 0.5 MG: 0.5 SOLUTION RESPIRATORY (INHALATION) at 18:20

## 2023-11-14 RX ADMIN — CARVEDILOL 12.5 MG: 12.5 TABLET, FILM COATED ORAL at 18:11

## 2023-11-14 RX ADMIN — PREDNISONE 40 MG: 10 TABLET ORAL at 07:57

## 2023-11-14 RX ADMIN — IPRATROPIUM BROMIDE 0.5 MG: 0.5 SOLUTION RESPIRATORY (INHALATION) at 06:17

## 2023-11-14 RX ADMIN — ENOXAPARIN SODIUM 40 MG: 100 INJECTION SUBCUTANEOUS at 09:54

## 2023-11-14 NOTE — PROGRESS NOTES
11/14/23 1528   Resting (Room Air)   SpO2 93   HR 93   During Walk (Room Air)   SpO2 91      Rate of Dyspnea 2   Symptoms Leg pain   After Walk   SpO2 98   Rate of Dyspnea 1   Does the Patient Qualify for Home O2 No   Does the Patient Need Portable Oxygen Tanks No

## 2023-11-14 NOTE — PROGRESS NOTES
[facility]    [facility] Note    Patient:  Sana Thornton  YOB: 1960  Date of Service: 11/14/2023  MRN: [id number]   Acct: [de-identified]   Primary Care Physician: Demetra Redman MD  Advance Directive: Full Code  Admit Date: 11/13/2023       Hospital Day: 1    Portions of this note have been copied forward, however, updated to reflect the most current clinical status of this patient. CHIEF COMPLAINT: SOB    CUMULATIVE HOSPITAL COURSE:     This patient is a 79-year-old female with PMH asthma, HFrEF, current smoker, and cardiac arrhythmia s/p AICD who presented to [facility] ED on 11/13 for evaluation of shortness of breath worse with exertion, and cough ongoing for several days. She is unsure what her triggers are but does report worsening symptoms with exposure to pollen. Additionally lives with multiple cats and dogs. She has never seen a pulmonologist. In ER, patient was bronchospastic. PO2 on ABGs 77 and . She received several nebulizers without much improvement, and was then given Mag and Solu-Medrol. She was then admitted to hospitalist with consult to pulmonology for further evaluation and management due to ongoing symptoms. Patient was placed on azithromycin, scheduled Pulmicort and Brovana, and prn albuterol nebulizers. She was given several doses of Solu-Medrol which was transitioned to p.o. prednisone. Azith added for possible COPD exacerbation as patient is a current smoker. Pulmonology evaluated the patient and recommended to continue current management and consider outpatient pulmonary function testing when back to her baseline. Her hypoxia on admission likely secondary to VQ mismatch caused by bronchospasm. Upon evaluation today, patient is still having frequent coughing fits, and wheezing persists. She reports ongoing shortness of breath, however slightly improved since admission.  Will keep patient one more night and follow pulm's recommendations as far as discharge

## 2023-11-14 NOTE — PLAN OF CARE
Problem: Safety - Adult  Goal: Free from fall injury  11/13/2023 2202 by Angelia Marshall RN  Outcome: Progressing  Flowsheets (Taken 11/13/2023 2200)  Free From Fall Injury: Instruct family/caregiver on patient safety  11/13/2023 1128 by Maki Frye LPN  Outcome: Progressing     Problem: ABCDS Injury Assessment  Goal: Absence of physical injury  11/13/2023 2202 by Angelia Marshall RN  Outcome: Progressing  Flowsheets (Taken 11/13/2023 2200)  Absence of Physical Injury: Implement safety measures based on patient assessment  11/13/2023 1128 by Maki Frye LPN  Outcome: Progressing     Problem: Respiratory - Adult  Goal: Achieves optimal ventilation and oxygenation  11/13/2023 2202 by Angelia Marshall RN  Outcome: Progressing  Flowsheets  Taken 11/13/2023 2116 by Angelia Marshall RN  Achieves optimal ventilation and oxygenation:   Assess for changes in respiratory status   Position to facilitate oxygenation and minimize respiratory effort   Respiratory therapy support as indicated  Taken 11/13/2023 1214 by Leticia Blanco RN  Achieves optimal ventilation and oxygenation:   Assess for changes in respiratory status   Position to facilitate oxygenation and minimize respiratory effort   Respiratory therapy support as indicated  11/13/2023 1128 by Maki Frye LPN  Outcome: Progressing     Problem: Chronic Conditions and Co-morbidities  Goal: Patient's chronic conditions and co-morbidity symptoms are monitored and maintained or improved  Outcome: Progressing  Flowsheets (Taken 11/13/2023 2116)  Care Plan - Patient's Chronic Conditions and Co-Morbidity Symptoms are Monitored and Maintained or Improved: Monitor and assess patient's chronic conditions and comorbid symptoms for stability, deterioration, or improvement

## 2023-11-14 NOTE — PLAN OF CARE
Problem: Safety - Adult  Goal: Free from fall injury  11/14/2023 0809 by Cristopher Opitz, LPN  Outcome: Progressing  11/13/2023 2202 by Devonte Wise RN  Outcome: Progressing  Flowsheets (Taken 11/13/2023 2200)  Free From Fall Injury: Instruct family/caregiver on patient safety     Problem: ABCDS Injury Assessment  Goal: Absence of physical injury  11/14/2023 0809 by Cristopher Opitz, LPN  Outcome: Progressing  11/13/2023 2202 by Deovnte Wise RN  Outcome: Progressing  Flowsheets (Taken 11/13/2023 2200)  Absence of Physical Injury: Implement safety measures based on patient assessment     Problem: Respiratory - Adult  Goal: Achieves optimal ventilation and oxygenation  11/14/2023 0809 by Cristopher Opitz, LPN  Outcome: Progressing  11/13/2023 2202 by Devonte Wise RN  Outcome: Progressing  Flowsheets  Taken 11/13/2023 2116 by Devonte Wise RN  Achieves optimal ventilation and oxygenation:   Assess for changes in respiratory status   Position to facilitate oxygenation and minimize respiratory effort   Respiratory therapy support as indicated  Taken 11/13/2023 1214 by Tanja Villanueva RN  Achieves optimal ventilation and oxygenation:   Assess for changes in respiratory status   Position to facilitate oxygenation and minimize respiratory effort   Respiratory therapy support as indicated     Problem: Chronic Conditions and Co-morbidities  Goal: Patient's chronic conditions and co-morbidity symptoms are monitored and maintained or improved  11/14/2023 0809 by Cristopher Opitz, LPN  Outcome: Progressing  11/13/2023 2202 by Devonte Wise RN  Outcome: Progressing  Flowsheets (Taken 11/13/2023 2116)  Care Plan - Patient's Chronic Conditions and Co-Morbidity Symptoms are Monitored and Maintained or Improved: Monitor and assess patient's chronic conditions and comorbid symptoms for stability, deterioration, or improvement

## 2023-11-14 NOTE — CONSULTS
Palliative Care: This is a very pleasant, talkative 66 yr old who presents to ED with SOA, hx of Asthma. Pt states \"I was out of air I could hardly walk through the door\". She is sitting up in bed waiting to find out if she will go home today. She tells me she has had multiple helen tx and this has helped. 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 No AD. Spouse is her designated HCS. Pain/Other Symptoms:    Denies pain, coughing d/t helen tx. How are symptoms affecting QOL   SOA causes inability to manage baseline activities. Psychological/Spiritual:  Does not attend [employer]. Has spouse support and friends. Children and grand children live in [place]. Plan:   Pt believes she will go home today with O2 ordered. States she really does not want O2 because \"I have no insurance to cover it\". Patient/family discussion r/t goals:   Plan is to return to her home with spouse. Awaiting dc and possible O2 to be delivered for home use. Pt tells me she does not have much activity. States \"I do what I can when I'm feeling ok\". She tells me she cares for her animals, laundry, and few things that do not cause her to get SOA. Pt reports she is usually asleep or sitting on her front porch. This has been ongoing now for 2 yrs. Possible dc home today. Palliative Care team will continue to follow and support, with ongoing review of pt's goals of care.                 Electronically signed by Jarod Fernández RN on 11/14/2023 at 2:47

## 2023-11-15 PROBLEM — I50.22 CHRONIC HFREF (HEART FAILURE WITH REDUCED EJECTION FRACTION) (HCC): Status: ACTIVE | Noted: 2023-06-11

## 2023-11-15 LAB
ANION GAP SERPL CALCULATED.3IONS-SCNC: 11 MMOL/L (ref 7–19)
BASOPHILS # BLD: 0 K/UL (ref 0–0.2)
BASOPHILS NFR BLD: 0 % (ref 0–1)
BUN SERPL-MCNC: 29 MG/DL (ref 8–23)
CALCIUM SERPL-MCNC: 8.9 MG/DL (ref 8.8–10.2)
CHLORIDE SERPL-SCNC: 105 MMOL/L (ref 98–111)
CO2 SERPL-SCNC: 24 MMOL/L (ref 22–29)
CREAT SERPL-MCNC: 0.9 MG/DL (ref 0.5–0.9)
EOSINOPHIL # BLD: 0 K/UL (ref 0–0.6)
EOSINOPHIL NFR BLD: 0 % (ref 0–5)
ERYTHROCYTE [DISTWIDTH] IN BLOOD BY AUTOMATED COUNT: 12.9 % (ref 11.5–14.5)
GLUCOSE SERPL-MCNC: 97 MG/DL (ref 74–109)
HCT VFR BLD AUTO: 39.2 % (ref 37–47)
HGB BLD-MCNC: 13 G/DL (ref 12–16)
IMM GRANULOCYTES # BLD: 0.1 K/UL
LYMPHOCYTES # BLD: 4.5 K/UL (ref 1.1–4.5)
LYMPHOCYTES NFR BLD: 24 % (ref 20–40)
MACROCYTES BLD QL SMEAR: ABNORMAL
MCH RBC QN AUTO: 34.7 PG (ref 27–31)
MCHC RBC AUTO-ENTMCNC: 33.2 G/DL (ref 33–37)
MCV RBC AUTO: 104.5 FL (ref 81–99)
MONOCYTES # BLD: 1.1 K/UL (ref 0–0.9)
MONOCYTES NFR BLD: 6 % (ref 0–10)
NEUTROPHILS # BLD: 13 K/UL (ref 1.5–7.5)
NEUTS SEG NFR BLD: 70 % (ref 50–65)
PLATELET # BLD AUTO: 263 K/UL (ref 130–400)
PLATELET SLIDE REVIEW: ADEQUATE
PMV BLD AUTO: 10 FL (ref 9.4–12.3)
POTASSIUM SERPL-SCNC: 4.5 MMOL/L (ref 3.5–5)
RBC # BLD AUTO: 3.75 M/UL (ref 4.2–5.4)
SODIUM SERPL-SCNC: 140 MMOL/L (ref 136–145)
WBC # BLD AUTO: 18.6 K/UL (ref 4.8–10.8)

## 2023-11-15 PROCEDURE — 6370000000 HC RX 637 (ALT 250 FOR IP): Performed by: HOSPITALIST

## 2023-11-15 PROCEDURE — 6370000000 HC RX 637 (ALT 250 FOR IP): Performed by: STUDENT IN AN ORGANIZED HEALTH CARE EDUCATION/TRAINING PROGRAM

## 2023-11-15 PROCEDURE — 1210000000 HC MED SURG R&B

## 2023-11-15 PROCEDURE — 6360000002 HC RX W HCPCS: Performed by: HOSPITALIST

## 2023-11-15 PROCEDURE — 6370000000 HC RX 637 (ALT 250 FOR IP)

## 2023-11-15 PROCEDURE — 6360000002 HC RX W HCPCS: Performed by: INTERNAL MEDICINE

## 2023-11-15 PROCEDURE — 94760 N-INVAS EAR/PLS OXIMETRY 1: CPT

## 2023-11-15 PROCEDURE — 94640 AIRWAY INHALATION TREATMENT: CPT

## 2023-11-15 PROCEDURE — 36415 COLL VENOUS BLD VENIPUNCTURE: CPT

## 2023-11-15 PROCEDURE — 80048 BASIC METABOLIC PNL TOTAL CA: CPT

## 2023-11-15 PROCEDURE — 85025 COMPLETE CBC W/AUTO DIFF WBC: CPT

## 2023-11-15 PROCEDURE — 99232 SBSQ HOSP IP/OBS MODERATE 35: CPT | Performed by: INTERNAL MEDICINE

## 2023-11-15 RX ORDER — IPRATROPIUM BROMIDE AND ALBUTEROL SULFATE 2.5; .5 MG/3ML; MG/3ML
1 SOLUTION RESPIRATORY (INHALATION)
Status: DISCONTINUED | OUTPATIENT
Start: 2023-11-15 | End: 2023-11-17

## 2023-11-15 RX ORDER — AZITHROMYCIN 250 MG/1
250 TABLET, FILM COATED ORAL DAILY
Status: COMPLETED | OUTPATIENT
Start: 2023-11-15 | End: 2023-11-17

## 2023-11-15 RX ORDER — ALBUTEROL SULFATE 2.5 MG/3ML
2.5 SOLUTION RESPIRATORY (INHALATION) EVERY 4 HOURS
Status: DISCONTINUED | OUTPATIENT
Start: 2023-11-15 | End: 2023-11-17 | Stop reason: HOSPADM

## 2023-11-15 RX ORDER — BENZONATATE 100 MG/1
200 CAPSULE ORAL 3 TIMES DAILY
Status: DISCONTINUED | OUTPATIENT
Start: 2023-11-15 | End: 2023-11-17 | Stop reason: HOSPADM

## 2023-11-15 RX ADMIN — FAMOTIDINE 20 MG: 20 TABLET ORAL at 21:53

## 2023-11-15 RX ADMIN — CARVEDILOL 12.5 MG: 12.5 TABLET, FILM COATED ORAL at 17:57

## 2023-11-15 RX ADMIN — GUAIFENESIN SYRUP AND DEXTROMETHORPHAN 5 ML: 100; 10 SYRUP ORAL at 14:12

## 2023-11-15 RX ADMIN — BUDESONIDE 500 MCG: 0.5 INHALANT ORAL at 18:37

## 2023-11-15 RX ADMIN — IPRATROPIUM BROMIDE 0.5 MG: 0.5 SOLUTION RESPIRATORY (INHALATION) at 06:20

## 2023-11-15 RX ADMIN — PREDNISONE 40 MG: 10 TABLET ORAL at 08:30

## 2023-11-15 RX ADMIN — BENZONATATE 200 MG: 100 CAPSULE ORAL at 10:52

## 2023-11-15 RX ADMIN — ARFORMOTEROL TARTRATE 15 MCG: 15 SOLUTION RESPIRATORY (INHALATION) at 06:17

## 2023-11-15 RX ADMIN — SPIRONOLACTONE 25 MG: 25 TABLET ORAL at 08:30

## 2023-11-15 RX ADMIN — PANTOPRAZOLE SODIUM 40 MG: 40 TABLET, DELAYED RELEASE ORAL at 08:30

## 2023-11-15 RX ADMIN — EMPAGLIFLOZIN 10 MG: 10 TABLET, FILM COATED ORAL at 08:30

## 2023-11-15 RX ADMIN — ENOXAPARIN SODIUM 40 MG: 100 INJECTION SUBCUTANEOUS at 08:29

## 2023-11-15 RX ADMIN — ALBUTEROL SULFATE 2.5 MG: 2.5 SOLUTION RESPIRATORY (INHALATION) at 22:20

## 2023-11-15 RX ADMIN — SACUBITRIL AND VALSARTAN 1 TABLET: 49; 51 TABLET, FILM COATED ORAL at 10:52

## 2023-11-15 RX ADMIN — ALBUTEROL SULFATE 2.5 MG: 2.5 SOLUTION RESPIRATORY (INHALATION) at 18:20

## 2023-11-15 RX ADMIN — SACUBITRIL AND VALSARTAN 1 TABLET: 49; 51 TABLET, FILM COATED ORAL at 21:53

## 2023-11-15 RX ADMIN — IPRATROPIUM BROMIDE AND ALBUTEROL SULFATE 1 DOSE: .5; 3 SOLUTION RESPIRATORY (INHALATION) at 10:01

## 2023-11-15 RX ADMIN — ALBUTEROL SULFATE 2.5 MG: 2.5 SOLUTION RESPIRATORY (INHALATION) at 00:20

## 2023-11-15 RX ADMIN — IPRATROPIUM BROMIDE AND ALBUTEROL SULFATE 1 DOSE: .5; 3 SOLUTION RESPIRATORY (INHALATION) at 14:10

## 2023-11-15 RX ADMIN — AZITHROMYCIN DIHYDRATE 250 MG: 250 TABLET ORAL at 08:35

## 2023-11-15 RX ADMIN — GUAIFENESIN SYRUP AND DEXTROMETHORPHAN 5 ML: 100; 10 SYRUP ORAL at 21:53

## 2023-11-15 RX ADMIN — ARFORMOTEROL TARTRATE 15 MCG: 15 SOLUTION RESPIRATORY (INHALATION) at 18:36

## 2023-11-15 RX ADMIN — GUAIFENESIN SYRUP AND DEXTROMETHORPHAN 5 ML: 100; 10 SYRUP ORAL at 08:30

## 2023-11-15 RX ADMIN — FAMOTIDINE 20 MG: 20 TABLET ORAL at 08:30

## 2023-11-15 RX ADMIN — BENZONATATE 200 MG: 100 CAPSULE ORAL at 16:02

## 2023-11-15 RX ADMIN — CARVEDILOL 12.5 MG: 12.5 TABLET, FILM COATED ORAL at 08:30

## 2023-11-15 RX ADMIN — GUAIFENESIN SYRUP AND DEXTROMETHORPHAN 5 ML: 100; 10 SYRUP ORAL at 01:36

## 2023-11-15 RX ADMIN — BUDESONIDE 500 MCG: 0.5 INHALANT ORAL at 06:17

## 2023-11-15 RX ADMIN — BENZONATATE 200 MG: 100 CAPSULE ORAL at 21:53

## 2023-11-15 NOTE — PLAN OF CARE
Problem: Safety - Adult  Goal: Free from fall injury  11/15/2023 1047 by Wilner Michaels RN  Outcome: Progressing  11/14/2023 2238 by César Gottlieb RN  Outcome: Progressing  Flowsheets (Taken 11/14/2023 2236)  Free From Fall Injury: Instruct family/caregiver on patient safety     Problem: ABCDS Injury Assessment  Goal: Absence of physical injury  11/15/2023 1047 by Wilner Michaels RN  Outcome: Progressing  11/14/2023 2238 by César Gottlieb RN  Outcome: Progressing  Flowsheets (Taken 11/14/2023 2236)  Absence of Physical Injury: Implement safety measures based on patient assessment     Problem: Respiratory - Adult  Goal: Achieves optimal ventilation and oxygenation  11/15/2023 1047 by Wilner Michaels RN  Outcome: Progressing  11/14/2023 2238 by César Gottlieb RN  Outcome: Progressing  Flowsheets (Taken 11/14/2023 2130)  Achieves optimal ventilation and oxygenation:   Assess for changes in respiratory status   Position to facilitate oxygenation and minimize respiratory effort     Problem: Chronic Conditions and Co-morbidities  Goal: Patient's chronic conditions and co-morbidity symptoms are monitored and maintained or improved  11/15/2023 1047 by Wilner Michaels RN  Outcome: Progressing  11/14/2023 2238 by César Gottlieb RN  Outcome: Progressing  Flowsheets (Taken 11/14/2023 2130)  Care Plan - Patient's Chronic Conditions and Co-Morbidity Symptoms are Monitored and Maintained or Improved: Monitor and assess patient's chronic conditions and comorbid symptoms for stability, deterioration, or improvement     Problem: Cardiovascular - Adult  Goal: Maintains optimal cardiac output and hemodynamic stability  Outcome: Progressing  Goal: Absence of cardiac dysrhythmias or at baseline  Outcome: Progressing     Problem: Metabolic/Fluid and Electrolytes - Adult  Goal: Electrolytes maintained within normal limits  Outcome: Progressing  Goal: Hemodynamic stability and optimal renal function maintained  Outcome: Progressing  Goal:

## 2023-11-15 NOTE — PLAN OF CARE
Problem: Safety - Adult  Goal: Free from fall injury  Outcome: Progressing  Flowsheets (Taken 11/14/2023 2236)  Free From Fall Injury: Instruct family/caregiver on patient safety     Problem: ABCDS Injury Assessment  Goal: Absence of physical injury  Outcome: Progressing  Flowsheets (Taken 11/14/2023 2236)  Absence of Physical Injury: Implement safety measures based on patient assessment     Problem: Respiratory - Adult  Goal: Achieves optimal ventilation and oxygenation  Outcome: Progressing  Flowsheets (Taken 11/14/2023 2130)  Achieves optimal ventilation and oxygenation:   Assess for changes in respiratory status   Position to facilitate oxygenation and minimize respiratory effort     Problem: Chronic Conditions and Co-morbidities  Goal: Patient's chronic conditions and co-morbidity symptoms are monitored and maintained or improved  Outcome: Progressing  Flowsheets (Taken 11/14/2023 2130)  Care Plan - Patient's Chronic Conditions and Co-Morbidity Symptoms are Monitored and Maintained or Improved: Monitor and assess patient's chronic conditions and comorbid symptoms for stability, deterioration, or improvement

## 2023-11-16 ENCOUNTER — APPOINTMENT (OUTPATIENT)
Dept: GENERAL RADIOLOGY | Age: 63
End: 2023-11-16

## 2023-11-16 PROBLEM — J45.51: Status: ACTIVE | Noted: 2023-11-13

## 2023-11-16 PROBLEM — R09.02 HYPOXIA: Status: ACTIVE | Noted: 2023-11-16

## 2023-11-16 LAB
BACTERIA URNS QL MICRO: NEGATIVE /HPF
BILIRUB UR QL STRIP: NEGATIVE
CLARITY UR: CLEAR
COLOR UR: YELLOW
CRYSTALS URNS MICRO: ABNORMAL /HPF
EPI CELLS #/AREA URNS AUTO: 0 /HPF (ref 0–5)
GLUCOSE UR STRIP.AUTO-MCNC: 500 MG/DL
HGB UR STRIP.AUTO-MCNC: ABNORMAL MG/L
HYALINE CASTS #/AREA URNS AUTO: 2 /HPF (ref 0–8)
KETONES UR STRIP.AUTO-MCNC: NEGATIVE MG/DL
LEUKOCYTE ESTERASE UR QL STRIP.AUTO: ABNORMAL
NITRITE UR QL STRIP.AUTO: NEGATIVE
PH UR STRIP.AUTO: 6.5 [PH] (ref 5–8)
PROT UR STRIP.AUTO-MCNC: NEGATIVE MG/DL
RBC #/AREA URNS AUTO: 3 /HPF (ref 0–4)
SP GR UR STRIP.AUTO: 1.01 (ref 1–1.03)
UROBILINOGEN UR STRIP.AUTO-MCNC: 0.2 E.U./DL
WBC #/AREA URNS AUTO: 92 /HPF (ref 0–5)

## 2023-11-16 PROCEDURE — 81001 URINALYSIS AUTO W/SCOPE: CPT

## 2023-11-16 PROCEDURE — 6370000000 HC RX 637 (ALT 250 FOR IP)

## 2023-11-16 PROCEDURE — 2580000003 HC RX 258: Performed by: HOSPITALIST

## 2023-11-16 PROCEDURE — 6360000002 HC RX W HCPCS: Performed by: INTERNAL MEDICINE

## 2023-11-16 PROCEDURE — 6360000002 HC RX W HCPCS: Performed by: HOSPITALIST

## 2023-11-16 PROCEDURE — 6370000000 HC RX 637 (ALT 250 FOR IP): Performed by: STUDENT IN AN ORGANIZED HEALTH CARE EDUCATION/TRAINING PROGRAM

## 2023-11-16 PROCEDURE — 6370000000 HC RX 637 (ALT 250 FOR IP): Performed by: HOSPITALIST

## 2023-11-16 PROCEDURE — 99232 SBSQ HOSP IP/OBS MODERATE 35: CPT | Performed by: INTERNAL MEDICINE

## 2023-11-16 PROCEDURE — 71045 X-RAY EXAM CHEST 1 VIEW: CPT

## 2023-11-16 PROCEDURE — 94640 AIRWAY INHALATION TREATMENT: CPT

## 2023-11-16 PROCEDURE — 1210000000 HC MED SURG R&B

## 2023-11-16 PROCEDURE — 87186 SC STD MICRODIL/AGAR DIL: CPT

## 2023-11-16 PROCEDURE — 94760 N-INVAS EAR/PLS OXIMETRY 1: CPT

## 2023-11-16 PROCEDURE — 87086 URINE CULTURE/COLONY COUNT: CPT

## 2023-11-16 RX ORDER — PHENAZOPYRIDINE HYDROCHLORIDE 100 MG/1
200 TABLET, FILM COATED ORAL
Status: DISCONTINUED | OUTPATIENT
Start: 2023-11-16 | End: 2023-11-17 | Stop reason: HOSPADM

## 2023-11-16 RX ADMIN — ALBUTEROL SULFATE 2.5 MG: 2.5 SOLUTION RESPIRATORY (INHALATION) at 22:53

## 2023-11-16 RX ADMIN — CARVEDILOL 12.5 MG: 12.5 TABLET, FILM COATED ORAL at 09:25

## 2023-11-16 RX ADMIN — ALBUTEROL SULFATE 2.5 MG: 2.5 SOLUTION RESPIRATORY (INHALATION) at 14:23

## 2023-11-16 RX ADMIN — GUAIFENESIN SYRUP AND DEXTROMETHORPHAN 5 ML: 100; 10 SYRUP ORAL at 01:34

## 2023-11-16 RX ADMIN — ALBUTEROL SULFATE 2.5 MG: 2.5 SOLUTION RESPIRATORY (INHALATION) at 02:07

## 2023-11-16 RX ADMIN — ARFORMOTEROL TARTRATE 15 MCG: 15 SOLUTION RESPIRATORY (INHALATION) at 19:39

## 2023-11-16 RX ADMIN — GUAIFENESIN SYRUP AND DEXTROMETHORPHAN 5 ML: 100; 10 SYRUP ORAL at 15:14

## 2023-11-16 RX ADMIN — ALBUTEROL SULFATE 2.5 MG: 2.5 SOLUTION RESPIRATORY (INHALATION) at 19:50

## 2023-11-16 RX ADMIN — PHENAZOPYRIDINE 200 MG: 100 TABLET ORAL at 17:33

## 2023-11-16 RX ADMIN — PHENAZOPYRIDINE 200 MG: 100 TABLET ORAL at 11:44

## 2023-11-16 RX ADMIN — BUDESONIDE 500 MCG: 0.5 INHALANT ORAL at 19:39

## 2023-11-16 RX ADMIN — SPIRONOLACTONE 25 MG: 25 TABLET ORAL at 09:25

## 2023-11-16 RX ADMIN — BENZONATATE 200 MG: 100 CAPSULE ORAL at 20:51

## 2023-11-16 RX ADMIN — GUAIFENESIN SYRUP AND DEXTROMETHORPHAN 5 ML: 100; 10 SYRUP ORAL at 20:51

## 2023-11-16 RX ADMIN — SODIUM CHLORIDE, PRESERVATIVE FREE 10 ML: 5 INJECTION INTRAVENOUS at 20:51

## 2023-11-16 RX ADMIN — FAMOTIDINE 20 MG: 20 TABLET ORAL at 20:51

## 2023-11-16 RX ADMIN — WATER 1000 MG: 1 INJECTION INTRAMUSCULAR; INTRAVENOUS; SUBCUTANEOUS at 02:01

## 2023-11-16 RX ADMIN — BENZONATATE 200 MG: 100 CAPSULE ORAL at 15:14

## 2023-11-16 RX ADMIN — Medication 5 MG: at 20:51

## 2023-11-16 RX ADMIN — EMPAGLIFLOZIN 10 MG: 10 TABLET, FILM COATED ORAL at 09:25

## 2023-11-16 RX ADMIN — PHENAZOPYRIDINE 200 MG: 100 TABLET ORAL at 06:03

## 2023-11-16 RX ADMIN — SACUBITRIL AND VALSARTAN 1 TABLET: 49; 51 TABLET, FILM COATED ORAL at 20:51

## 2023-11-16 RX ADMIN — ALBUTEROL SULFATE 2.5 MG: 2.5 SOLUTION RESPIRATORY (INHALATION) at 10:12

## 2023-11-16 RX ADMIN — SACUBITRIL AND VALSARTAN 1 TABLET: 49; 51 TABLET, FILM COATED ORAL at 11:45

## 2023-11-16 RX ADMIN — FAMOTIDINE 20 MG: 20 TABLET ORAL at 09:25

## 2023-11-16 RX ADMIN — SODIUM CHLORIDE, PRESERVATIVE FREE 10 ML: 5 INJECTION INTRAVENOUS at 09:26

## 2023-11-16 RX ADMIN — PANTOPRAZOLE SODIUM 40 MG: 40 TABLET, DELAYED RELEASE ORAL at 06:03

## 2023-11-16 RX ADMIN — BUDESONIDE 500 MCG: 0.5 INHALANT ORAL at 06:16

## 2023-11-16 RX ADMIN — PREDNISONE 40 MG: 10 TABLET ORAL at 09:24

## 2023-11-16 RX ADMIN — ALBUTEROL SULFATE 2.5 MG: 2.5 SOLUTION RESPIRATORY (INHALATION) at 06:08

## 2023-11-16 RX ADMIN — BENZONATATE 200 MG: 100 CAPSULE ORAL at 09:25

## 2023-11-16 RX ADMIN — AZITHROMYCIN DIHYDRATE 250 MG: 250 TABLET ORAL at 09:34

## 2023-11-16 RX ADMIN — GUAIFENESIN SYRUP AND DEXTROMETHORPHAN 5 ML: 100; 10 SYRUP ORAL at 09:33

## 2023-11-16 RX ADMIN — ENOXAPARIN SODIUM 40 MG: 100 INJECTION SUBCUTANEOUS at 09:26

## 2023-11-16 RX ADMIN — ARFORMOTEROL TARTRATE 15 MCG: 15 SOLUTION RESPIRATORY (INHALATION) at 06:16

## 2023-11-16 NOTE — PLAN OF CARE
Contacted about UA results    Rocpehin 1g IV Q24h added for UTI Tx  Pyridium 200mg PO TID PRN added for comfort

## 2023-11-17 VITALS
BODY MASS INDEX: 29.09 KG/M2 | SYSTOLIC BLOOD PRESSURE: 105 MMHG | OXYGEN SATURATION: 96 % | HEART RATE: 67 BPM | TEMPERATURE: 97 F | HEIGHT: 66 IN | RESPIRATION RATE: 18 BRPM | DIASTOLIC BLOOD PRESSURE: 73 MMHG | WEIGHT: 181 LBS

## 2023-11-17 PROCEDURE — 6370000000 HC RX 637 (ALT 250 FOR IP)

## 2023-11-17 PROCEDURE — 94760 N-INVAS EAR/PLS OXIMETRY 1: CPT

## 2023-11-17 PROCEDURE — 6370000000 HC RX 637 (ALT 250 FOR IP): Performed by: HOSPITALIST

## 2023-11-17 PROCEDURE — 99222 1ST HOSP IP/OBS MODERATE 55: CPT | Performed by: INTERNAL MEDICINE

## 2023-11-17 PROCEDURE — 94640 AIRWAY INHALATION TREATMENT: CPT

## 2023-11-17 PROCEDURE — 6370000000 HC RX 637 (ALT 250 FOR IP): Performed by: STUDENT IN AN ORGANIZED HEALTH CARE EDUCATION/TRAINING PROGRAM

## 2023-11-17 PROCEDURE — 6360000002 HC RX W HCPCS: Performed by: HOSPITALIST

## 2023-11-17 PROCEDURE — 6360000002 HC RX W HCPCS: Performed by: INTERNAL MEDICINE

## 2023-11-17 PROCEDURE — 2580000003 HC RX 258: Performed by: HOSPITALIST

## 2023-11-17 RX ORDER — CEFDINIR 300 MG/1
300 CAPSULE ORAL 2 TIMES DAILY
Qty: 6 CAPSULE | Refills: 0 | Status: SHIPPED | OUTPATIENT
Start: 2023-11-17 | End: 2023-11-20

## 2023-11-17 RX ORDER — PREDNISONE 20 MG/1
40 TABLET ORAL DAILY
Qty: 6 TABLET | Refills: 0 | Status: SHIPPED | OUTPATIENT
Start: 2023-11-18 | End: 2023-11-21

## 2023-11-17 RX ADMIN — PANTOPRAZOLE SODIUM 40 MG: 40 TABLET, DELAYED RELEASE ORAL at 05:57

## 2023-11-17 RX ADMIN — SACUBITRIL AND VALSARTAN 1 TABLET: 49; 51 TABLET, FILM COATED ORAL at 08:05

## 2023-11-17 RX ADMIN — WATER 1000 MG: 1 INJECTION INTRAMUSCULAR; INTRAVENOUS; SUBCUTANEOUS at 00:50

## 2023-11-17 RX ADMIN — FUROSEMIDE 40 MG: 40 TABLET ORAL at 08:06

## 2023-11-17 RX ADMIN — SODIUM CHLORIDE, PRESERVATIVE FREE 10 ML: 5 INJECTION INTRAVENOUS at 08:06

## 2023-11-17 RX ADMIN — ARFORMOTEROL TARTRATE 15 MCG: 15 SOLUTION RESPIRATORY (INHALATION) at 07:20

## 2023-11-17 RX ADMIN — SPIRONOLACTONE 25 MG: 25 TABLET ORAL at 08:05

## 2023-11-17 RX ADMIN — ENOXAPARIN SODIUM 40 MG: 100 INJECTION SUBCUTANEOUS at 08:06

## 2023-11-17 RX ADMIN — BUDESONIDE 500 MCG: 0.5 INHALANT ORAL at 07:20

## 2023-11-17 RX ADMIN — AZITHROMYCIN DIHYDRATE 250 MG: 250 TABLET ORAL at 08:04

## 2023-11-17 RX ADMIN — PHENAZOPYRIDINE 200 MG: 100 TABLET ORAL at 08:06

## 2023-11-17 RX ADMIN — PREDNISONE 40 MG: 10 TABLET ORAL at 08:05

## 2023-11-17 RX ADMIN — GUAIFENESIN SYRUP AND DEXTROMETHORPHAN 5 ML: 100; 10 SYRUP ORAL at 00:50

## 2023-11-17 RX ADMIN — CARVEDILOL 12.5 MG: 12.5 TABLET, FILM COATED ORAL at 08:05

## 2023-11-17 RX ADMIN — ALBUTEROL SULFATE 2.5 MG: 2.5 SOLUTION RESPIRATORY (INHALATION) at 07:10

## 2023-11-17 RX ADMIN — GUAIFENESIN SYRUP AND DEXTROMETHORPHAN 5 ML: 100; 10 SYRUP ORAL at 13:49

## 2023-11-17 RX ADMIN — ALBUTEROL SULFATE 2.5 MG: 2.5 SOLUTION RESPIRATORY (INHALATION) at 10:42

## 2023-11-17 RX ADMIN — BENZONATATE 200 MG: 100 CAPSULE ORAL at 08:05

## 2023-11-17 RX ADMIN — PHENAZOPYRIDINE 200 MG: 100 TABLET ORAL at 12:24

## 2023-11-17 RX ADMIN — FAMOTIDINE 20 MG: 20 TABLET ORAL at 08:05

## 2023-11-17 RX ADMIN — EMPAGLIFLOZIN 10 MG: 10 TABLET, FILM COATED ORAL at 08:04

## 2023-11-17 RX ADMIN — GUAIFENESIN SYRUP AND DEXTROMETHORPHAN 5 ML: 100; 10 SYRUP ORAL at 05:57

## 2023-11-17 RX ADMIN — ALBUTEROL SULFATE 2.5 MG: 2.5 SOLUTION RESPIRATORY (INHALATION) at 02:19

## 2023-11-17 ASSESSMENT — ENCOUNTER SYMPTOMS
GASTROINTESTINAL NEGATIVE: 1
RESPIRATORY NEGATIVE: 1
DIARRHEA: 0
VOMITING: 0
NAUSEA: 0
EYES NEGATIVE: 1
SHORTNESS OF BREATH: 0

## 2023-11-17 ASSESSMENT — PAIN SCALES - GENERAL
PAINLEVEL_OUTOF10: 0
PAINLEVEL_OUTOF10: 0

## 2023-11-17 NOTE — PLAN OF CARE
Problem: Safety - Adult  Goal: Free from fall injury  Outcome: Progressing  Flowsheets (Taken 11/16/2023 0930 by Ludwin العراقي RN)  Free From Fall Injury: Instruct family/caregiver on patient safety     Problem: ABCDS Injury Assessment  Goal: Absence of physical injury  Outcome: Progressing  Flowsheets (Taken 11/16/2023 0930 by Ludwin العراقي RN)  Absence of Physical Injury: Implement safety measures based on patient assessment     Problem: Respiratory - Adult  Goal: Achieves optimal ventilation and oxygenation  Outcome: Progressing  Flowsheets (Taken 11/16/2023 0930 by Ludwin العراقي RN)  Achieves optimal ventilation and oxygenation: Assess for changes in respiratory status     Problem: Cardiovascular - Adult  Goal: Maintains optimal cardiac output and hemodynamic stability  Outcome: Progressing  Flowsheets (Taken 11/16/2023 0930 by Ludwin العراقي RN)  Maintains optimal cardiac output and hemodynamic stability: Monitor blood pressure and heart rate  Goal: Absence of cardiac dysrhythmias or at baseline  Outcome: Progressing  Flowsheets (Taken 11/16/2023 0930 by Ludwin العراقي RN)  Absence of cardiac dysrhythmias or at baseline: Monitor cardiac rate and rhythm     Problem: Metabolic/Fluid and Electrolytes - Adult  Goal: Electrolytes maintained within normal limits  Outcome: Progressing  Flowsheets (Taken 11/16/2023 0930 by Ludwin العراقي RN)  Electrolytes maintained within normal limits: Monitor labs and assess patient for signs and symptoms of electrolyte imbalances  Goal: Hemodynamic stability and optimal renal function maintained  Outcome: Progressing  Flowsheets (Taken 11/16/2023 0930 by Ludwin العراقي RN)  Hemodynamic stability and optimal renal function maintained: Monitor labs and assess for signs and symptoms of volume excess or deficit  Goal: Glucose maintained within prescribed range  Outcome: Progressing  Flowsheets (Taken 11/16/2023 0930 by Ludwin العراقي RN)  Glucose maintained within prescribed range: Monitor blood

## 2023-11-17 NOTE — PROGRESS NOTES
CLINICAL PHARMACY NOTE: MEDS TO BEDS    Total # of Prescriptions Filled: 2   The following medications were delivered to the patient:  Current Discharge Medication List        START taking these medications    Details   cefdinir (OMNICEF) 300 MG capsule Take 1 capsule by mouth 2 times daily for 3 days  Qty: 6 capsule, Refills: 0      predniSONE (DELTASONE) 20 MG tablet Take 2 tablets by mouth daily for 3 doses  Qty: 6 tablet, Refills: 0               Additional Documentation:    Delivered RX to patient bedside. Paid with card.

## 2023-11-17 NOTE — CONSULTS
[facility]  Cardiology Consult      Requesting MD:  Susan Rodriguez MD   Admit Status:         History obtained from:   [] Patient  [] Other (specify):     Patient:  See Adame  [id number]     Chief Complaint:   Chief Complaint   Patient presents with    Shortness of Breath     States that she fees like her asthma has flared and that she feels like she is retaining fluid; states that she took an extra half of lasix yesterday         HPI: Ms. Trenton Quintanilla is a 66 y.o. female with a history of nonischemic dilated cardiomyopathy chronic congestive heart failure previous biventricular ICD that I follow regularly seen last recently on 10/23/2023 now admitted 11/13/2023 feeling poorly for the past 5 days felt she was coming down with a cold had some tickling in her throat turning into tightness in her throat increasing dyspnea awaken her at 02 30 on Friday. Placed on multiple medications. Chest x-ray no acute findings. High-sensitivity troponin was 9.  proBNP was 231 on 11/13/2023 down from 909 on 6/14/2023. She has no edema. Cardiac medications were held briefly but are now being given. No other issues reported. Review of Systems:  Review of Systems   Constitutional: Negative. Negative for chills, fever and unexpected weight change. HENT: Negative. Eyes: Negative. Respiratory: Negative. Negative for shortness of breath. Cardiovascular: Negative. Negative for chest pain. Gastrointestinal: Negative. Negative for diarrhea, nausea and vomiting. Endocrine: Negative. Genitourinary: Negative. Musculoskeletal: Negative. Skin: Negative. Neurological: Negative.         Cardiac Specific Data:  Specialty Problems          Cardiology Problems    Acute HFrEF (heart failure with reduced ejection fraction) ([facility])        Chest pain        Chronic HFrEF (heart failure with reduced ejection fraction) ([facility])        Acute on chronic combined systolic and diastolic CHF Troponin: @LABRCNT (CKTOTAL:3, CKMB:3, TROPONINI:3)@    Last 3 BNP:  No results for input(s): \"BNP\" in the last 72 hours. IMAGING:  XR CHEST PORTABLE    Result Date: 11/16/2023    EXAM:  SINGLE FRONTAL VIEW OF THE CHEST  HISTORY: Cough. COMPARISON:  Chest x-ray 11/13/2023  FINDINGS: Cardiomediastinal silhouette is unchanged with stable lead wires. There is no pneumothorax or effusion. There is no consolidation, nodule or mass. The osseous structures are unremarkable with surgical change of the right shoulder. No acute cardiopulmonary process    ______________________________________ Electronically signed by: Ismael Mackey M.D. Date:     11/16/2023 Time:    07:12     XR CHEST PORTABLE    Result Date: 11/13/2023  EXAM: CHEST RADIOGRAPH  TECHNIQUE: Single frontal chest radiograph. HISTORY: Shortness of breath. COMPARISON: 06/11/2023. FINDINGS: Lungs/Pleura: The lungs are clear. There is no pleural effusion. There is no pneumothorax. Heart: The heart size is normal. Bones: Right shoulder arthroplasty. Other: Left subclavian pacemaker/AICD. 1.  No acute findings. ______________________________________ Electronically signed by: Ros Briseno M.D. Date:     11/13/2023 Time:    06:21       Assessment:  Admission 11/13/2023  Asthma  Tobacco abuse 1/2 pack/day currently  Nonischemic dilated cardiomyopathy  Chronic congestive heart failure  Obesity  Neuropathy        Recommendations:  Continue her home cardiac medications note not to give Coreg and Entresto at the same time we typically separate these by at least 1 hour. Also we do not hold these medications for what you would otherwise consider hypotension. Typically would hold only for persistent hypotension less than 80-85 or symptomatic. Stable from a cardiac standpoint may be discharged at your discretion.

## 2023-11-17 NOTE — DISCHARGE SUMMARY
Discharge Summary    NAME: Josy Ignacio  :  1960  MRN:  [id number]    Admit date:  2023  Discharge date:    2023    Advance Directive: Full Code    Consults: pulmonary    Primary Care Physician:  Serafin Ball MD    Discharge Diagnoses:  Principal Problem:    Extrinsic asthma, with acute exacerbation, severe persistent  Active Problems:    Chronic HFrEF (heart failure with reduced ejection fraction) ([address])    Hypoxia          Significant Diagnostic Studies:   XR CHEST PORTABLE    Result Date: 2023  EXAM: CHEST RADIOGRAPH  TECHNIQUE: Single frontal chest radiograph. HISTORY: Shortness of breath. COMPARISON: 2023. FINDINGS: Lungs/Pleura: The lungs are clear. There is no pleural effusion. There is no pneumothorax. Heart: The heart size is normal. Bones: Right shoulder arthroplasty. Other: Left subclavian pacemaker/AICD. 1.  No acute findings. ______________________________________ Electronically signed by: Roselia Ibanez M.D. Date:     2023 Time:    06:21       Pertinent Labs:   CBC:   Recent Labs     11/15/23  0440   WBC 18.6*   HGB 13.0        BMP:    Recent Labs     11/15/23  0440      K 4.5      CO2 24   BUN 29*   CREATININE 0.9   GLUCOSE 97             Hospital Course:      59-year-old female with asthma, HFrEF, AICD in place, smoking history admitted with exacerbation of asthma after presenting with worsening dyspnea from baseline, wheezing and cough over several days. Treated with nebulized bronchodilators, steroids and azithromycin. Seen in consultation by pulmonology. She was weaned off supplemental oxygen. Later developed a UTI and started on Rocephin with improvement in symptoms. Patient medically. Stable for discharge home. Prescribed maintenance bronchodilator regimen per pulmonology and discharged on short course of antibiotics and prednisone. Follow-up with PCP within a week.   Referred to pulmonology and she will follow-up with daily               Where to Get Your Medications        These medications were sent to [address], [address] [facility]  [address], [address] [address]      Phone: [phone]   cefdinir 300 MG capsule  predniSONE 20 MG tablet       These medications were sent to [address] [facility], [address] [facility]  [address] [facility], [facility] Tomi Farias       Phone: [phone]   mometasone-formoterol 100-5 MCG/ACT inhaler         Discharge Instructions: Follow up with Rivas Steward MD within 1 week. Take medications as directed. Resume activity as tolerated. Diet: ADULT DIET; Regular; 4 carb choices (60 gm/meal); Low Fat/Low Chol/High Fiber/2 gm Na; Low Sodium (2 gm)     Disposition: Patient is medically stable and will be discharged home. Time spent on discharge 35 minutes.     Signed:  Jacqueline Briceño MD  11/17/2023 9:46 AM

## 2023-11-17 NOTE — PROGRESS NOTES
Nutrition Assessment     Type and Reason for Visit: Initial, RD Nutrition Re-Screen/LOS    Nutrition Recommendations/Plan:   Continue current POC     Malnutrition Assessment:  Malnutrition Status: No malnutrition    Nutrition Assessment:  Following patient for LOS x 4 days. PO intake has remained good with intake ranging %. Weight has remained stable in the past yr. A1c not available. Nutrition Related Findings:   Glucose  Wound Type: None    Current Nutrition Therapies:    ADULT DIET; Regular; 4 carb choices (60 gm/meal); Low Fat/Low Chol/High Fiber/2 gm Na;  Low Sodium (2 gm)    Anthropometric Measures:  Height: 167.6 cm (5' 5.98\")  Current Body Wt: 82.1 kg (181 lb)   BMI: 29.2    Nutrition Diagnosis:   Overweight/Obese related to excessive energy intake as evidenced by BMI    Nutrition Interventions:   Food and/or Nutrient Delivery: Continue Current Diet  Nutrition Education/Counseling: No recommendation at this time  Coordination of Nutrition Care: Continue to monitor while inpatient       Goals:     Goals: Meet at least 75% of estimated needs, PO intake 50% or greater       Nutrition Monitoring and Evaluation:   Behavioral-Environmental Outcomes: None Identified  Food/Nutrient Intake Outcomes: Food and Nutrient Intake  Physical Signs/Symptoms Outcomes: Biochemical Data, Weight, Skin, Fluid Status or Edema    Discharge Planning:    Continue current diet     Lottie Aguilar, MS, RD, LD  Contact: [phone]

## 2023-11-19 LAB
BACTERIA UR CULT: ABNORMAL
BACTERIA UR CULT: ABNORMAL
ORGANISM: ABNORMAL

## 2024-01-25 DIAGNOSIS — I50.42 CHRONIC COMBINED SYSTOLIC AND DIASTOLIC CHF, NYHA CLASS 3 (HCC): ICD-10-CM

## 2024-01-25 DIAGNOSIS — Z95.810 ICD (IMPLANTABLE CARDIOVERTER-DEFIBRILLATOR), BIVENTRICULAR, IN SITU: Primary | ICD-10-CM

## 2024-01-25 DIAGNOSIS — I42.8 NONISCHEMIC CARDIOMYOPATHY (HCC): ICD-10-CM

## 2024-04-25 ENCOUNTER — OFFICE VISIT (OUTPATIENT)
Dept: CARDIOLOGY CLINIC | Age: 64
End: 2024-04-25

## 2024-04-25 VITALS
SYSTOLIC BLOOD PRESSURE: 118 MMHG | BODY MASS INDEX: 32.6 KG/M2 | DIASTOLIC BLOOD PRESSURE: 74 MMHG | WEIGHT: 184 LBS | OXYGEN SATURATION: 98 % | HEIGHT: 63 IN | HEART RATE: 75 BPM

## 2024-04-25 DIAGNOSIS — I50.22 CHRONIC SYSTOLIC HEART FAILURE (HCC): ICD-10-CM

## 2024-04-25 DIAGNOSIS — I42.8 NONISCHEMIC CARDIOMYOPATHY (HCC): Primary | ICD-10-CM

## 2024-04-25 ASSESSMENT — ENCOUNTER SYMPTOMS
COUGH: 0
SORE THROAT: 0
WHEEZING: 0
SHORTNESS OF BREATH: 1
CHEST TIGHTNESS: 0

## 2024-04-25 NOTE — PROGRESS NOTES
diagnosed this    CHF (congestive heart failure) ([facility])     Heart failure (H[facility])     reduced ejection fx    Palliative care patient 2023    Tobacco abuse     Upper respiratory infection      Past Surgical History:   Procedure Laterality Date    ANKLE SURGERY Left     ptstates she was 19, and spring    BREAST ENHANCEMENT SURGERY Bilateral     CARDIAC CATHETERIZATION N/A 2022    CARDIAC DEFIBRILLATOR PLACEMENT N/A 08/10/2022     SECTION N/A 07/15/1990     SECTION N/A 1994    REVERSE TOTAL SHOULDER ARTHROPLASTY Right 2006    pt states with bone graft    SHOULDER SURGERY Right 2005    SHOULDER SURGERY Right 2006    SHOULDER SURGERY Right 2005     Family History   Problem Relation Age of Onset    Stroke Mother 69        second had smoke from hsuband    High Blood Pressure Father     Cerebral Aneurysm Father 44        smoker    No Known Problems Sister     No Known Problems Sister     No Known Problems Sister     No Known Problems Brother     No Known Problems Brother     No Known Problems Son     No Known Problems Son     No Known Problems Daughter     No Known Problems Daughter     No Known Problems Daughter      Social History     Tobacco Use    Smoking status: Every Day     Current packs/day: 0.75     Average packs/day: 0.8 packs/day for 46.3 years (34.7 ttl pk-yrs)     Types: Cigarettes     Start date:     Smokeless tobacco: Never    Tobacco comments:     Started at age 18, did not smoke during her 4 pregnancies, has always averaged 3/4 PPD, NOW is at 1/2 PPD   Substance Use Topics    Alcohol use: Yes     Comment: has 4 beers per year          Review of Systems:    Review of Systems   Constitutional:  Positive for fatigue. Negative for activity change, chills, diaphoresis and fever.   HENT:  Negative for congestion and sore throat.    Respiratory:  Positive for shortness of breath. Negative for cough, chest tightness and wheezing.    Cardiovascular:  Negative

## 2024-04-29 DIAGNOSIS — Z95.810 ICD (IMPLANTABLE CARDIOVERTER-DEFIBRILLATOR), BIVENTRICULAR, IN SITU: Primary | ICD-10-CM

## 2024-04-29 DIAGNOSIS — I42.8 NONISCHEMIC CARDIOMYOPATHY (HCC): ICD-10-CM

## 2024-04-29 DIAGNOSIS — I50.22 CHRONIC SYSTOLIC HEART FAILURE (HCC): ICD-10-CM

## 2024-05-05 PROCEDURE — 93295 DEV INTERROG REMOTE 1/2/MLT: CPT | Performed by: NURSE PRACTITIONER

## 2024-05-06 DIAGNOSIS — I42.8 NONISCHEMIC CARDIOMYOPATHY (HCC): ICD-10-CM

## 2024-05-06 DIAGNOSIS — Z95.810 BIVENTRICULAR ICD (IMPLANTABLE CARDIOVERTER-DEFIBRILLATOR) IN PLACE: Primary | ICD-10-CM

## 2024-05-06 DIAGNOSIS — I50.22 CHRONIC SYSTOLIC HEART FAILURE (HCC): ICD-10-CM

## 2024-07-25 ENCOUNTER — OFFICE VISIT (OUTPATIENT)
Dept: CARDIOLOGY CLINIC | Age: 64
End: 2024-07-25

## 2024-07-25 VITALS
DIASTOLIC BLOOD PRESSURE: 64 MMHG | BODY MASS INDEX: 31.53 KG/M2 | HEART RATE: 102 BPM | WEIGHT: 178 LBS | SYSTOLIC BLOOD PRESSURE: 104 MMHG

## 2024-07-25 DIAGNOSIS — I42.8 NONISCHEMIC CARDIOMYOPATHY (HCC): Primary | ICD-10-CM

## 2024-07-25 DIAGNOSIS — I50.22 CHRONIC SYSTOLIC HEART FAILURE (HCC): ICD-10-CM

## 2024-07-25 DIAGNOSIS — Z95.810 BIVENTRICULAR ICD (IMPLANTABLE CARDIOVERTER-DEFIBRILLATOR) IN PLACE: ICD-10-CM

## 2024-07-25 DIAGNOSIS — I44.7 LEFT BUNDLE BRANCH BLOCK: ICD-10-CM

## 2024-07-25 DIAGNOSIS — I42.8 NONISCHEMIC CARDIOMYOPATHY (HCC): ICD-10-CM

## 2024-07-25 LAB
ANION GAP SERPL CALCULATED.3IONS-SCNC: 15 MMOL/L (ref 7–19)
BNP BLD-MCNC: 100 PG/ML (ref 0–124)
BUN SERPL-MCNC: 28 MG/DL (ref 8–23)
CALCIUM SERPL-MCNC: 8.8 MG/DL (ref 8.8–10.2)
CHLORIDE SERPL-SCNC: 101 MMOL/L (ref 98–111)
CO2 SERPL-SCNC: 25 MMOL/L (ref 22–29)
CREAT SERPL-MCNC: 1.1 MG/DL (ref 0.5–0.9)
GLUCOSE SERPL-MCNC: 92 MG/DL (ref 74–109)
POTASSIUM SERPL-SCNC: 3.9 MMOL/L (ref 3.5–5)
SODIUM SERPL-SCNC: 141 MMOL/L (ref 136–145)

## 2024-07-25 PROCEDURE — 93282 PRGRMG EVAL IMPLANTABLE DFB: CPT | Performed by: INTERNAL MEDICINE

## 2024-07-25 PROCEDURE — 99214 OFFICE O/P EST MOD 30 MIN: CPT | Performed by: INTERNAL MEDICINE

## 2024-07-25 RX ORDER — GABAPENTIN 300 MG/1
300 CAPSULE ORAL 3 TIMES DAILY
COMMUNITY

## 2024-07-25 RX ORDER — METOPROLOL SUCCINATE 25 MG/1
25 TABLET, EXTENDED RELEASE ORAL DAILY
Qty: 30 TABLET | Refills: 3 | Status: SHIPPED | OUTPATIENT
Start: 2024-07-25

## 2024-07-25 RX ORDER — SPIRONOLACTONE 25 MG/1
12.5 TABLET ORAL DAILY
Qty: 15 TABLET | Refills: 3 | Status: SHIPPED | OUTPATIENT
Start: 2024-07-25

## 2024-07-25 RX ORDER — ESOMEPRAZOLE MAGNESIUM 20 MG/1
20 GRANULE, DELAYED RELEASE ORAL DAILY
COMMUNITY

## 2024-07-25 ASSESSMENT — ENCOUNTER SYMPTOMS
RESPIRATORY NEGATIVE: 1
EYES NEGATIVE: 1
GASTROINTESTINAL NEGATIVE: 1
DIARRHEA: 0
VOMITING: 0
SHORTNESS OF BREATH: 0
NAUSEA: 0

## 2024-07-25 NOTE — PROGRESS NOTES
ICD interrogated  Presenting rhythm: AS/, AP <0.1%,  98.1%  Battery status: 5.2 years  Lead status: lead impedance within range and stable  Sensing: P waves 1.1 mV,  R waves 18.9 mV  Thresholds:  Atrial 0.500 V @ 0.4ms, ventricular 0.750 V @ 0.4ms, LV 0.250 V @ 0.4ms  Observations:  1 monitored episode VT (appears to be SVT)  See scanned report for details  Reprogramming for sensitivity and threshold testing  Next [facility] home check scheduled for 10/29/24

## 2024-07-25 NOTE — PROGRESS NOTES
[facility] Progress [facility]                            Date:  7/25/2024  Patient: Angela Muhammad  Age:  64 y.o., 1960      Reason for evaluation:         SUBJECTIVE:    Return today for follow-up assessment follow-up for congestive heart failure cardiomyopathy biventricular ICD implant left bundle branch block.  Recently hospitalized at [facility] for some type of pancreatitis possibly due to gallbladder sludge according to the patient.  No immediate recommendations for surgery.  During the hospitalization her other physicians stopped Aldactone and Coreg for the time being.  Dyspnea stable device interrogated functioning appropriately 5.2 years longevity had 1 episode of nonsustained ventricular tachycardia on interrogation.  Denies chest pain.  No other complaints or issues reported.    Review of Systems   Constitutional: Negative.  Negative for chills, fever and unexpected weight change.   HENT: Negative.     Eyes: Negative.    Respiratory: Negative.  Negative for shortness of breath.    Cardiovascular: Negative.  Negative for chest pain.   Gastrointestinal: Negative.  Negative for diarrhea, nausea and vomiting.   Endocrine: Negative.    Genitourinary: Negative.    Musculoskeletal: Negative.    Skin: Negative.    Neurological: Negative.    All other systems reviewed and are negative.        OBJECTIVE:    /64   Pulse (!) 102   Wt 80.7 kg (178 lb)   BMI 31.53 kg/m²     Labs:   CBC: No results for input(s): \"WBC\", \"HGB\", \"HCT\", \"PLT\" in the last 72 hours.  BMP:No results for input(s): \"NA\", \"K\", \"CO2\", \"BUN\", \"CREATININE\", \"LABGLOM\", \"GLUCOSE\" in the last 72 hours.  BNP: No results for input(s): \"BNP\" in the last 72 hours.  PT/INR: No results for input(s): \"PROTIME\", \"INR\" in the last 72 hours.  APTT:No results for input(s): \"APTT\" in the last 72 hours.  CARDIAC ENZYMES:No results for input(s): \"CKTOTAL\", \"CKMB\", \"CKMBINDEX\", \"TROPONINI\" in the last 72 hours.  FASTING LIPID PANEL:  Lab

## 2024-07-25 NOTE — TELEPHONE ENCOUNTER
The patient is not taking coreg, we have it marked that way in her chart. MA's are not allowed to take out medications now we can only select \"not taking\". VERNON just saw her and his note was signed before I could get the toprol in there.

## 2024-09-05 ENCOUNTER — OFFICE VISIT (OUTPATIENT)
Dept: CARDIOLOGY CLINIC | Age: 64
End: 2024-09-05

## 2024-09-05 VITALS
HEIGHT: 66 IN | SYSTOLIC BLOOD PRESSURE: 116 MMHG | DIASTOLIC BLOOD PRESSURE: 76 MMHG | HEART RATE: 82 BPM | BODY MASS INDEX: 28.77 KG/M2 | WEIGHT: 179 LBS

## 2024-09-05 DIAGNOSIS — I44.7 LEFT BUNDLE BRANCH BLOCK: ICD-10-CM

## 2024-09-05 DIAGNOSIS — I50.22 CHRONIC SYSTOLIC HEART FAILURE (HCC): Primary | ICD-10-CM

## 2024-09-05 DIAGNOSIS — Z95.810 BIVENTRICULAR ICD (IMPLANTABLE CARDIOVERTER-DEFIBRILLATOR) IN PLACE: ICD-10-CM

## 2024-09-05 DIAGNOSIS — I50.42 CHRONIC COMBINED SYSTOLIC AND DIASTOLIC CHF, NYHA CLASS 3 (HCC): ICD-10-CM

## 2024-09-05 DIAGNOSIS — I42.8 NONISCHEMIC CARDIOMYOPATHY (HCC): ICD-10-CM

## 2024-09-05 RX ORDER — METOPROLOL SUCCINATE 50 MG/1
50 TABLET, EXTENDED RELEASE ORAL DAILY
Qty: 30 TABLET | Refills: 6 | Status: SHIPPED | OUTPATIENT
Start: 2024-09-05

## 2024-09-05 ASSESSMENT — ENCOUNTER SYMPTOMS
DIARRHEA: 0
VOMITING: 0
GASTROINTESTINAL NEGATIVE: 1
NAUSEA: 0
EYES NEGATIVE: 1
RESPIRATORY NEGATIVE: 1
SHORTNESS OF BREATH: 0

## 2024-09-05 NOTE — PROGRESS NOTES
[facility] Progress [facility]                            Date:  9/5/2024  Patient: Angela Muhammad  Age:  64 y.o., 1960      Reason for evaluation:         SUBJECTIVE:    Returns today for follow-up assessment follow-up for cardiomyopathy chronic congestive heart failure biventricular ICD left bundle branch block overall doing well all conditions at goal.  Blood pressure 116/76 heart 83.  Tolerating Toprol 25 mg daily quite well feels great we will try her on 50 mg daily for now.  No other complaints or issues reported.    Review of Systems   Constitutional: Negative.  Negative for chills, fever and unexpected weight change.   HENT: Negative.     Eyes: Negative.    Respiratory: Negative.  Negative for shortness of breath.    Cardiovascular: Negative.  Negative for chest pain.   Gastrointestinal: Negative.  Negative for diarrhea, nausea and vomiting.   Endocrine: Negative.    Genitourinary: Negative.    Musculoskeletal: Negative.    Skin: Negative.    Neurological: Negative.    All other systems reviewed and are negative.        OBJECTIVE:    /76   Pulse 82   Ht 1.676 m (5' 6\")   Wt 81.2 kg (179 lb)   BMI 28.89 kg/m²     Labs:   CBC: No results for input(s): \"WBC\", \"HGB\", \"HCT\", \"PLT\" in the last 72 hours.  BMP:No results for input(s): \"NA\", \"K\", \"CO2\", \"BUN\", \"CREATININE\", \"LABGLOM\", \"GLUCOSE\" in the last 72 hours.  BNP: No results for input(s): \"BNP\" in the last 72 hours.  PT/INR: No results for input(s): \"PROTIME\", \"INR\" in the last 72 hours.  APTT:No results for input(s): \"APTT\" in the last 72 hours.  CARDIAC ENZYMES:No results for input(s): \"CKTOTAL\", \"CKMB\", \"CKMBINDEX\", \"TROPONINI\" in the last 72 hours.  FASTING LIPID PANEL:  Lab Results   Component Value Date/Time    HDL 59 06/12/2023 01:49 AM    TRIG 263 06/12/2023 01:49 AM     LIVER PROFILE:No results for input(s): \"AST\", \"ALT\", \"LABALBU\" in the last 72 hours.        Past Medical History:   Diagnosis Date    AICD (automatic

## 2024-09-05 NOTE — PROGRESS NOTES
ICD interrogated  Presenting rhythm: AS/VS, AP <0.1%,  98.3%  Battery status: 5.5 years  Lead status: lead impedance within range and stable  Sensing: P waves 1.1 mV,  R waves >20 mV  Thresholds:  Atrial 0.500 V @ 0.4ms, ventricular 0.500 V @ 0.4ms, LV 1.500 V @ 0.4ms  Observations:  None  See scanned report for details  Reprogramming for sensitivity and threshold testing  Next [facility] home check scheduled for 12/05/24

## 2024-09-23 ENCOUNTER — TELEPHONE (OUTPATIENT)
Dept: CARDIOLOGY CLINIC | Age: 64
End: 2024-09-23

## 2024-11-17 DIAGNOSIS — I50.42 CHRONIC COMBINED SYSTOLIC AND DIASTOLIC CHF, NYHA CLASS 3 (HCC): ICD-10-CM

## 2024-11-17 DIAGNOSIS — I42.8 NONISCHEMIC CARDIOMYOPATHY (HCC): ICD-10-CM

## 2024-11-18 RX ORDER — SPIRONOLACTONE 25 MG/1
TABLET ORAL
Qty: 90 TABLET | Refills: 3 | Status: SHIPPED | OUTPATIENT
Start: 2024-11-18

## 2024-12-05 DIAGNOSIS — Z95.810 BIVENTRICULAR ICD (IMPLANTABLE CARDIOVERTER-DEFIBRILLATOR) IN PLACE: ICD-10-CM

## 2024-12-05 DIAGNOSIS — I42.8 NONISCHEMIC CARDIOMYOPATHY (HCC): Primary | ICD-10-CM

## 2024-12-05 DIAGNOSIS — I50.22 CHRONIC SYSTOLIC HEART FAILURE (HCC): ICD-10-CM

## 2024-12-09 ENCOUNTER — OFFICE VISIT (OUTPATIENT)
Dept: CARDIOLOGY CLINIC | Age: 64
End: 2024-12-09

## 2024-12-09 VITALS
HEART RATE: 87 BPM | WEIGHT: 176 LBS | BODY MASS INDEX: 28.28 KG/M2 | DIASTOLIC BLOOD PRESSURE: 68 MMHG | SYSTOLIC BLOOD PRESSURE: 122 MMHG | HEIGHT: 66 IN

## 2024-12-09 DIAGNOSIS — I42.8 NONISCHEMIC CARDIOMYOPATHY (HCC): Primary | ICD-10-CM

## 2024-12-09 DIAGNOSIS — I50.42 CHRONIC COMBINED SYSTOLIC AND DIASTOLIC CHF, NYHA CLASS 3 (HCC): ICD-10-CM

## 2024-12-09 DIAGNOSIS — I44.7 LEFT BUNDLE BRANCH BLOCK: ICD-10-CM

## 2024-12-09 DIAGNOSIS — Z95.810 BIVENTRICULAR ICD (IMPLANTABLE CARDIOVERTER-DEFIBRILLATOR) IN PLACE: ICD-10-CM

## 2024-12-09 PROCEDURE — 99214 OFFICE O/P EST MOD 30 MIN: CPT | Performed by: INTERNAL MEDICINE

## 2024-12-09 RX ORDER — FUROSEMIDE 40 MG/1
40 TABLET ORAL DAILY
Qty: 90 TABLET | Refills: 3 | Status: SHIPPED | OUTPATIENT
Start: 2024-12-09 | End: 2025-12-04

## 2024-12-09 ASSESSMENT — ENCOUNTER SYMPTOMS
GASTROINTESTINAL NEGATIVE: 1
EYES NEGATIVE: 1
VOMITING: 0
NAUSEA: 0
DIARRHEA: 0
SHORTNESS OF BREATH: 0
RESPIRATORY NEGATIVE: 1

## 2024-12-09 NOTE — PROGRESS NOTES
[facility] Progress [facility]                            Date:  12/9/2024  Patient: Angela Muhammad  Age:  64 y.o., 1960      Reason for evaluation:         SUBJECTIVE:    Returns today follow-up assessment cardiomyopathy biventricular ICD chronic congestive heart failure systolic and diastolic left bundle branch block overall doing reasonably well.  Device interrogated functioning appropriately 5.3 years longevity no atrial fibrillation burden.  Apparently her OptiVol increased a little bit recently she started back on Lasix we discussed that and parameters for adjustment.  Dyspnea stable denies chest pain no palpitations no other complaints or issues reported.  Blood pressure 122/68 heart 87.    Review of Systems   Constitutional: Negative.  Negative for chills, fever and unexpected weight change.   HENT: Negative.     Eyes: Negative.    Respiratory: Negative.  Negative for shortness of breath.    Cardiovascular: Negative.  Negative for chest pain.   Gastrointestinal: Negative.  Negative for diarrhea, nausea and vomiting.   Endocrine: Negative.    Genitourinary: Negative.    Musculoskeletal: Negative.    Skin: Negative.    Neurological: Negative.    All other systems reviewed and are negative.        OBJECTIVE:    /68   Pulse 87   Ht 1.676 m (5' 6\")   Wt 79.8 kg (176 lb)   BMI 28.41 kg/m²     Labs:   CBC: No results for input(s): \"WBC\", \"HGB\", \"HCT\", \"PLT\" in the last 72 hours.  BMP:No results for input(s): \"NA\", \"K\", \"CO2\", \"BUN\", \"CREATININE\", \"LABGLOM\", \"GLUCOSE\" in the last 72 hours.  BNP: No results for input(s): \"BNP\" in the last 72 hours.  PT/INR: No results for input(s): \"PROTIME\", \"INR\" in the last 72 hours.  APTT:No results for input(s): \"APTT\" in the last 72 hours.  CARDIAC ENZYMES:No results for input(s): \"CKTOTAL\", \"CKMB\", \"CKMBINDEX\", \"TROPONINI\" in the last 72 hours.  FASTING LIPID PANEL:  Lab Results   Component Value Date/Time    HDL 59 06/12/2023 01:49 AM    TRIG 263

## 2025-01-13 DIAGNOSIS — I50.42 CHRONIC COMBINED SYSTOLIC AND DIASTOLIC CHF, NYHA CLASS 3 (HCC): ICD-10-CM

## 2025-01-13 DIAGNOSIS — Z95.810 BIVENTRICULAR ICD (IMPLANTABLE CARDIOVERTER-DEFIBRILLATOR) IN PLACE: Primary | ICD-10-CM

## 2025-01-13 DIAGNOSIS — I42.8 NONISCHEMIC CARDIOMYOPATHY (HCC): ICD-10-CM

## 2025-01-13 NOTE — RESULT ENCOUNTER NOTE
Left message for patient to return call.  She needs to schedule follow up in March with Dr. Boggs.  Will need to find out if she is having any symptoms.

## 2025-02-16 PROCEDURE — 93297 REM INTERROG DEV EVAL ICPMS: CPT | Performed by: NURSE PRACTITIONER

## 2025-02-17 DIAGNOSIS — I50.42 CHRONIC COMBINED SYSTOLIC AND DIASTOLIC CHF, NYHA CLASS 3 (HCC): ICD-10-CM

## 2025-02-17 DIAGNOSIS — Z95.810 BIVENTRICULAR ICD (IMPLANTABLE CARDIOVERTER-DEFIBRILLATOR) IN PLACE: Primary | ICD-10-CM

## 2025-02-17 DIAGNOSIS — I42.8 NONISCHEMIC CARDIOMYOPATHY (HCC): ICD-10-CM

## 2025-03-17 ENCOUNTER — OFFICE VISIT (OUTPATIENT)
Dept: CARDIOLOGY CLINIC | Age: 65
End: 2025-03-17

## 2025-03-17 VITALS
BODY MASS INDEX: 29.49 KG/M2 | HEIGHT: 65 IN | SYSTOLIC BLOOD PRESSURE: 128 MMHG | HEART RATE: 84 BPM | DIASTOLIC BLOOD PRESSURE: 78 MMHG | WEIGHT: 177 LBS

## 2025-03-17 DIAGNOSIS — I50.22 CHRONIC HFREF (HEART FAILURE WITH REDUCED EJECTION FRACTION) (HCC): ICD-10-CM

## 2025-03-17 DIAGNOSIS — I42.8 NONISCHEMIC CARDIOMYOPATHY (HCC): Primary | ICD-10-CM

## 2025-03-17 DIAGNOSIS — Z95.810 BIVENTRICULAR ICD (IMPLANTABLE CARDIOVERTER-DEFIBRILLATOR) IN PLACE: ICD-10-CM

## 2025-03-17 DIAGNOSIS — I50.42 CHRONIC COMBINED SYSTOLIC AND DIASTOLIC CHF, NYHA CLASS 3 (HCC): ICD-10-CM

## 2025-03-17 DIAGNOSIS — I42.8 NONISCHEMIC CARDIOMYOPATHY (HCC): ICD-10-CM

## 2025-03-17 DIAGNOSIS — I44.7 LEFT BUNDLE BRANCH BLOCK: ICD-10-CM

## 2025-03-17 LAB
ANION GAP SERPL CALCULATED.3IONS-SCNC: 11 MMOL/L (ref 8–16)
BNP BLD-MCNC: 257 PG/ML (ref 0–124)
BUN SERPL-MCNC: 30 MG/DL (ref 8–23)
CALCIUM SERPL-MCNC: 9.1 MG/DL (ref 8.8–10.2)
CHLORIDE SERPL-SCNC: 103 MMOL/L (ref 98–107)
CO2 SERPL-SCNC: 28 MMOL/L (ref 22–29)
CREAT SERPL-MCNC: 1.1 MG/DL (ref 0.5–0.9)
GLUCOSE SERPL-MCNC: 119 MG/DL (ref 70–99)
POTASSIUM SERPL-SCNC: 5.5 MMOL/L (ref 3.5–5.1)
SODIUM SERPL-SCNC: 142 MMOL/L (ref 136–145)

## 2025-03-17 PROCEDURE — 99214 OFFICE O/P EST MOD 30 MIN: CPT | Performed by: INTERNAL MEDICINE

## 2025-03-17 ASSESSMENT — ENCOUNTER SYMPTOMS
VOMITING: 0
DIARRHEA: 0
NAUSEA: 0
EYES NEGATIVE: 1
RESPIRATORY NEGATIVE: 1
GASTROINTESTINAL NEGATIVE: 1
SHORTNESS OF BREATH: 0

## 2025-04-16 RX ORDER — METOPROLOL SUCCINATE 50 MG/1
50 TABLET, EXTENDED RELEASE ORAL DAILY
Qty: 30 TABLET | Refills: 5 | Status: SHIPPED | OUTPATIENT
Start: 2025-04-16

## 2025-05-20 ENCOUNTER — RESULTS FOLLOW-UP (OUTPATIENT)
Dept: CARDIOLOGY CLINIC | Age: 65
End: 2025-05-20

## 2025-05-20 DIAGNOSIS — Z95.810 ICD (IMPLANTABLE CARDIOVERTER-DEFIBRILLATOR), BIVENTRICULAR, IN SITU: Primary | ICD-10-CM

## 2025-05-20 DIAGNOSIS — I50.42 CHRONIC COMBINED SYSTOLIC AND DIASTOLIC CHF, NYHA CLASS 3 (HCC): ICD-10-CM

## 2025-05-20 DIAGNOSIS — I50.22 CHRONIC SYSTOLIC HEART FAILURE (HCC): ICD-10-CM

## 2025-06-17 ENCOUNTER — RESULTS FOLLOW-UP (OUTPATIENT)
Dept: CARDIOLOGY CLINIC | Age: 65
End: 2025-06-17

## 2025-06-17 ENCOUNTER — OFFICE VISIT (OUTPATIENT)
Dept: CARDIOLOGY CLINIC | Age: 65
End: 2025-06-17

## 2025-06-17 VITALS
DIASTOLIC BLOOD PRESSURE: 58 MMHG | SYSTOLIC BLOOD PRESSURE: 116 MMHG | HEART RATE: 96 BPM | WEIGHT: 179 LBS | HEIGHT: 66 IN | BODY MASS INDEX: 28.77 KG/M2

## 2025-06-17 DIAGNOSIS — I42.8 NONISCHEMIC CARDIOMYOPATHY (HCC): ICD-10-CM

## 2025-06-17 DIAGNOSIS — I50.40 COMBINED SYSTOLIC AND DIASTOLIC ACC/AHA STAGE C CONGESTIVE HEART FAILURE (HCC): ICD-10-CM

## 2025-06-17 DIAGNOSIS — I42.8 NONISCHEMIC CARDIOMYOPATHY (HCC): Primary | ICD-10-CM

## 2025-06-17 LAB
ANION GAP SERPL CALCULATED.3IONS-SCNC: 16 MMOL/L (ref 8–16)
BUN SERPL-MCNC: 24 MG/DL (ref 8–23)
CALCIUM SERPL-MCNC: 9.9 MG/DL (ref 8.8–10.2)
CHLORIDE SERPL-SCNC: 98 MMOL/L (ref 98–107)
CO2 SERPL-SCNC: 22 MMOL/L (ref 22–29)
CREAT SERPL-MCNC: 1 MG/DL (ref 0.5–0.9)
GLUCOSE SERPL-MCNC: 110 MG/DL (ref 70–99)
POTASSIUM SERPL-SCNC: 4.3 MMOL/L (ref 3.5–5.1)
SODIUM SERPL-SCNC: 136 MMOL/L (ref 136–145)

## 2025-06-17 RX ORDER — FLUTICASONE FUROATE, UMECLIDINIUM BROMIDE AND VILANTEROL TRIFENATATE 100; 62.5; 25 UG/1; UG/1; UG/1
1 POWDER RESPIRATORY (INHALATION) DAILY
COMMUNITY

## 2025-06-17 ASSESSMENT — ENCOUNTER SYMPTOMS
COUGH: 0
SORE THROAT: 0
WHEEZING: 0
SHORTNESS OF BREATH: 1
CHEST TIGHTNESS: 0

## 2025-06-17 NOTE — PROGRESS NOTES
[facility] Cardiology   Established Patient Office Visit  [address]  [address]  [facility] [address]  [phone]        OFFICE VISIT:  2025    Angela Muhammad - : 1960    Reason For Visit:  Angela is a 64 y.o. female who is here for Follow-up    1. Nonischemic cardiomyopathy (HCC)    2. Combined systolic and diastolic ACC/AHA stage C congestive heart failure (HCC)        Patient with a history of nonischemic cardiomyopathy, chronic congestive heart failure, biventricular ICD.    He is a patient of Dr. Boggs.    Patient was last seen by Dr. Boggs on 3/17/2025.  Device interrogation showed 4 episodes of nonsustained ventricular tachycardia.  She was not interested in doing a follow-up stress test so he did discuss with her going back on the Toprol 50 mg daily.    Patient presents to clinic today for follow-up.  Patient states the Toprol has helped.  However she is still having some chest discomfort.  Did recommend patient split the Toprol 25 mg twice a day and we would reevaluate on return to clinic.    Subjective    Angela Muhammad is a 64 y.o. female with the following history as recorded in [facility]:    Patient Active Problem List    Diagnosis Date Noted    Decreased cardiac ejection fraction 08/10/2022    Hypoxia 2023    Extrinsic asthma, with acute exacerbation, severe persistent ([facility]) 2023    Palliative care patient 2023    Nonischemic cardiomyopathy (HCC) 2023    Neuropathy 2023    Acute on chronic combined systolic and diastolic CHF (congestive heart failure) ([facility]) 2023    Asthma with acute exacerbation 2023    Chronic HFrEF (heart failure with reduced ejection fraction) ([facility]) 2023    Chest pain 2022    Acute HFrEF (heart failure with reduced ejection fraction) ([facility]) 2022    Current smoker 2022     Current Outpatient Medications   Medication Sig Dispense Refill    fluticasone-umeclidin-vilant (TRELEGY ELLIPTA)

## 2025-08-21 DIAGNOSIS — I42.8 NONISCHEMIC CARDIOMYOPATHY (HCC): ICD-10-CM

## 2025-08-21 DIAGNOSIS — I50.42 CHRONIC COMBINED SYSTOLIC AND DIASTOLIC CHF, NYHA CLASS 3 (HCC): ICD-10-CM

## 2025-08-21 DIAGNOSIS — Z95.810 BIVENTRICULAR ICD (IMPLANTABLE CARDIOVERTER-DEFIBRILLATOR) IN PLACE: Primary | ICD-10-CM
